# Patient Record
Sex: MALE | Race: WHITE | NOT HISPANIC OR LATINO | Employment: OTHER | URBAN - METROPOLITAN AREA
[De-identification: names, ages, dates, MRNs, and addresses within clinical notes are randomized per-mention and may not be internally consistent; named-entity substitution may affect disease eponyms.]

---

## 2023-08-28 ENCOUNTER — HOSPITAL ENCOUNTER (OUTPATIENT)
Dept: DATA CONVERSION | Facility: HOSPITAL | Age: 58
Discharge: HOME | End: 2023-08-28
Payer: COMMERCIAL

## 2023-08-28 DIAGNOSIS — Z00.00 ENCOUNTER FOR GENERAL ADULT MEDICAL EXAMINATION WITHOUT ABNORMAL FINDINGS: ICD-10-CM

## 2023-08-28 DIAGNOSIS — E78.5 HYPERLIPIDEMIA, UNSPECIFIED: ICD-10-CM

## 2023-08-28 DIAGNOSIS — Z12.11 ENCOUNTER FOR SCREENING FOR MALIGNANT NEOPLASM OF COLON: ICD-10-CM

## 2023-08-28 DIAGNOSIS — Z12.5 ENCOUNTER FOR SCREENING FOR MALIGNANT NEOPLASM OF PROSTATE: ICD-10-CM

## 2023-08-28 DIAGNOSIS — E55.9 VITAMIN D DEFICIENCY, UNSPECIFIED: ICD-10-CM

## 2023-08-28 LAB
25(OH)D3 SERPL-MCNC: 16 NG/ML (ref 31–100)
ALBUMIN SERPL-MCNC: 3.7 GM/DL (ref 3.5–5)
ALBUMIN/GLOB SERPL: 1 RATIO (ref 1.5–3)
ALP BLD-CCNC: 100 U/L (ref 35–125)
ALT SERPL-CCNC: 18 U/L (ref 5–40)
ANION GAP SERPL CALCULATED.3IONS-SCNC: 14 MMOL/L (ref 0–19)
APPEARANCE PLAS: CLEAR
AST SERPL-CCNC: 21 U/L (ref 5–40)
BASOPHILS # BLD AUTO: 0.09 K/UL (ref 0–0.22)
BASOPHILS NFR BLD AUTO: 1.3 % (ref 0–1)
BILIRUB SERPL-MCNC: 0.4 MG/DL (ref 0.1–1.2)
BUN SERPL-MCNC: 14 MG/DL (ref 8–25)
BUN/CREAT SERPL: 10 RATIO (ref 8–21)
CALCIUM SERPL-MCNC: 9.1 MG/DL (ref 8.5–10.4)
CHLORIDE SERPL-SCNC: 105 MMOL/L (ref 97–107)
CHOLEST SERPL-MCNC: 246 MG/DL (ref 133–200)
CHOLEST/HDLC SERPL: 5.5 RATIO
CO2 SERPL-SCNC: 22 MMOL/L (ref 24–31)
COLOR SPUN FLD: YELLOW
CREAT SERPL-MCNC: 1.4 MG/DL (ref 0.4–1.6)
DEPRECATED RDW RBC AUTO: 49.4 FL (ref 37–54)
DIFFERENTIAL METHOD BLD: ABNORMAL
EOSINOPHIL # BLD AUTO: 0.09 K/UL (ref 0–0.45)
EOSINOPHIL NFR BLD: 1.3 % (ref 0–3)
ERYTHROCYTE [DISTWIDTH] IN BLOOD BY AUTOMATED COUNT: 14.7 % (ref 11.7–15)
FASTING STATUS PATIENT QL REPORTED: ABNORMAL
GFR SERPL CREATININE-BSD FRML MDRD: 58 ML/MIN/1.73 M2
GLOBULIN SER-MCNC: 3.6 G/DL (ref 1.9–3.7)
GLUCOSE SERPL-MCNC: 90 MG/DL (ref 65–99)
HCT VFR BLD AUTO: 47.4 % (ref 41–50)
HDLC SERPL-MCNC: 45 MG/DL
HGB BLD-MCNC: 15.6 GM/DL (ref 13.5–16.5)
IMM GRANULOCYTES # BLD AUTO: 0.09 K/UL (ref 0–0.1)
LDLC SERPL CALC-MCNC: 185 MG/DL (ref 65–130)
LYMPHOCYTES # BLD AUTO: 1.25 K/UL (ref 1.2–3.2)
LYMPHOCYTES NFR BLD MANUAL: 17.4 % (ref 20–40)
MCH RBC QN AUTO: 30.1 PG (ref 26–34)
MCHC RBC AUTO-ENTMCNC: 32.9 % (ref 31–37)
MCV RBC AUTO: 91.5 FL (ref 80–100)
MONOCYTES # BLD AUTO: 0.91 K/UL (ref 0–0.8)
MONOCYTES NFR BLD MANUAL: 12.7 % (ref 0–8)
NEUTROPHILS # BLD AUTO: 4.75 K/UL
NEUTROPHILS # BLD AUTO: 4.75 K/UL (ref 1.8–7.7)
NEUTROPHILS.IMMATURE NFR BLD: 1.3 % (ref 0–1)
NEUTS SEG NFR BLD: 66 % (ref 50–70)
NRBC BLD-RTO: 0 /100 WBC
PLATELET # BLD AUTO: 261 K/UL (ref 150–450)
PMV BLD AUTO: 9.4 CU (ref 7–12.6)
POTASSIUM SERPL-SCNC: 4.8 MMOL/L (ref 3.4–5.1)
PROT SERPL-MCNC: 7.3 G/DL (ref 5.9–7.9)
PSA SERPL-MCNC: 0.1 NG/ML (ref 0–4.1)
RBC # BLD AUTO: 5.18 M/UL (ref 4.5–5.5)
SODIUM SERPL-SCNC: 141 MMOL/L (ref 133–145)
TRIGL SERPL-MCNC: 80 MG/DL (ref 40–150)
WBC # BLD AUTO: 7.2 K/UL (ref 4.5–11)

## 2023-09-01 PROBLEM — R40.1 CLOUDED CONSCIOUSNESS: Status: ACTIVE | Noted: 2023-09-01

## 2023-09-01 PROBLEM — I10 ESSENTIAL (PRIMARY) HYPERTENSION: Status: ACTIVE | Noted: 2023-09-01

## 2023-09-01 PROBLEM — F72 SEVERE INTELLECTUAL DISABILITY: Status: ACTIVE | Noted: 2018-09-12

## 2023-09-01 PROBLEM — R01.1 HEART MURMUR: Status: ACTIVE | Noted: 2023-09-01

## 2023-09-01 PROBLEM — D72.821 MONOCYTOSIS: Status: ACTIVE | Noted: 2021-02-19

## 2023-09-01 PROBLEM — L03.90 CELLULITIS: Status: ACTIVE | Noted: 2023-09-01

## 2023-09-01 PROBLEM — N18.30 STAGE 3 CHRONIC KIDNEY DISEASE (MULTI): Status: ACTIVE | Noted: 2023-09-01

## 2023-09-01 PROBLEM — T14.90XA BLUNT INJURY: Status: ACTIVE | Noted: 2023-09-01

## 2023-09-01 PROBLEM — R55 VASOVAGAL SYNCOPE: Status: ACTIVE | Noted: 2023-09-01

## 2023-09-01 PROBLEM — R09.02 HYPOXEMIA: Status: ACTIVE | Noted: 2023-09-01

## 2023-09-01 PROBLEM — I44.2 COMPLETE HEART BLOCK (MULTI): Status: ACTIVE | Noted: 2023-09-01

## 2023-09-01 PROBLEM — E78.5 HYPERLIPIDEMIA: Status: ACTIVE | Noted: 2023-09-01

## 2023-09-01 PROBLEM — L03.012 PARONYCHIA OF THUMB, LEFT: Status: ACTIVE | Noted: 2023-09-01

## 2023-09-01 PROBLEM — L40.9 PSORIASIS, UNSPECIFIED: Status: ACTIVE | Noted: 2018-09-12

## 2023-09-01 PROBLEM — M10.9 ACUTE GOUT: Status: ACTIVE | Noted: 2023-09-01

## 2023-09-01 PROBLEM — F73 PROFOUND INTELLECTUAL DISABILITY: Status: ACTIVE | Noted: 2023-09-01

## 2023-09-01 PROBLEM — R73.01 IMPAIRED FASTING GLUCOSE: Status: ACTIVE | Noted: 2023-09-01

## 2023-09-01 PROBLEM — R55 SYNCOPE AND COLLAPSE: Status: ACTIVE | Noted: 2023-09-01

## 2023-09-01 PROBLEM — E55.9 VITAMIN D DEFICIENCY: Status: ACTIVE | Noted: 2023-09-01

## 2023-09-01 PROBLEM — E66.9 OBESITY: Status: ACTIVE | Noted: 2023-09-01

## 2023-09-01 PROBLEM — Z95.0 CARDIAC PACEMAKER: Status: ACTIVE | Noted: 2023-09-01

## 2023-09-01 PROBLEM — L02.519 ABSCESS OF FINGER: Status: ACTIVE | Noted: 2023-09-01

## 2023-09-01 PROBLEM — S09.90XA INJURY OF HEAD: Status: ACTIVE | Noted: 2023-09-01

## 2023-09-01 PROBLEM — R77.9 ELEVATED SERUM PROTEIN LEVEL: Status: ACTIVE | Noted: 2020-10-05

## 2023-09-01 PROBLEM — R06.00 DYSPNEA: Status: ACTIVE | Noted: 2023-09-01

## 2023-09-01 PROBLEM — L03.019 PARONYCHIA, FINGER: Status: ACTIVE | Noted: 2020-01-07
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
## 2023-09-01 PROBLEM — Q90.9 DOWN SYNDROME, UNSPECIFIED (HHS-HCC): Status: ACTIVE | Noted: 2018-06-06

## 2023-09-01 PROBLEM — Z91.018 MULTIPLE FOOD ALLERGIES: Status: ACTIVE | Noted: 2023-09-01

## 2023-09-01 PROBLEM — D72.819 LEUKOPENIA: Status: ACTIVE | Noted: 2023-09-01

## 2023-09-01 PROBLEM — I51.7 ENLARGED HEART: Status: ACTIVE | Noted: 2023-09-01

## 2023-09-01 PROBLEM — Q90.9 COMPLETE TRISOMY 21 SYNDROME (HHS-HCC): Status: ACTIVE | Noted: 2023-09-01

## 2023-09-01 PROBLEM — R04.0 EPISTAXIS: Status: ACTIVE | Noted: 2023-09-01

## 2023-09-01 PROBLEM — R00.1 BRADYCARDIA: Status: ACTIVE | Noted: 2023-09-01

## 2023-09-01 PROBLEM — J18.9 ATYPICAL PNEUMONIA: Status: ACTIVE | Noted: 2023-09-01

## 2023-09-01 PROBLEM — R91.8 INFILTRATE OF LUNG PRESENT ON IMAGING STUDY: Status: ACTIVE | Noted: 2018-12-04

## 2023-09-01 PROBLEM — N19 RENAL FAILURE: Status: ACTIVE | Noted: 2023-09-01

## 2023-09-01 PROBLEM — F72 SEVERE INTELLECTUAL DISABILITY: Status: ACTIVE | Noted: 2020-01-07

## 2023-09-01 PROBLEM — L03.012: Status: ACTIVE | Noted: 2023-09-01

## 2023-09-01 PROBLEM — K21.9 CHRONIC GERD: Status: ACTIVE | Noted: 2023-09-01

## 2023-09-01 PROBLEM — R09.02 HYPOXIA: Status: ACTIVE | Noted: 2023-09-01

## 2023-09-01 PROBLEM — E03.9 ACQUIRED HYPOTHYROIDISM: Status: ACTIVE | Noted: 2019-06-11

## 2023-09-01 PROBLEM — F41.9 ANXIETY: Status: ACTIVE | Noted: 2023-09-01

## 2023-09-01 PROBLEM — K21.9 GASTROESOPHAGEAL REFLUX DISEASE: Status: ACTIVE | Noted: 2023-09-01

## 2023-09-01 RX ORDER — ZINC GLUCONATE 13.3 MG
200 LOZENGE ORAL 2 TIMES DAILY
COMMUNITY
Start: 2020-06-02

## 2023-09-01 RX ORDER — LEVOTHYROXINE SODIUM 125 UG/1
125 TABLET ORAL
COMMUNITY
Start: 2023-06-07

## 2023-09-01 RX ORDER — LEVOTHYROXINE SODIUM 175 UG/1
175 TABLET ORAL
COMMUNITY
Start: 2018-06-06

## 2023-09-01 RX ORDER — SODIUM CHLORIDE 5 %
OINTMENT (GRAM) OPHTHALMIC (EYE)
COMMUNITY

## 2023-09-01 RX ORDER — HYDROXYZINE HYDROCHLORIDE 50 MG/1
2 TABLET, FILM COATED ORAL
COMMUNITY
Start: 2023-03-14

## 2023-09-01 RX ORDER — LORATADINE 10 MG/1
10 TABLET ORAL
COMMUNITY
Start: 2023-06-07

## 2023-09-01 RX ORDER — DOCUSATE CALCIUM 240 MG
240 CAPSULE ORAL DAILY
COMMUNITY
Start: 2018-07-06

## 2023-09-01 RX ORDER — CHLORHEXIDINE GLUCONATE ORAL RINSE 1.2 MG/ML
0.02 SOLUTION DENTAL 2 TIMES DAILY
COMMUNITY
Start: 2018-06-07

## 2023-09-01 RX ORDER — ACETAMINOPHEN 500 MG/1
500 CAPSULE, LIQUID FILLED ORAL
COMMUNITY

## 2023-09-01 RX ORDER — LEVOTHYROXINE SODIUM 137 UG/1
137 TABLET ORAL DAILY
COMMUNITY
Start: 2020-01-08

## 2023-09-01 RX ORDER — IBUPROFEN 400 MG/1
400 TABLET ORAL
COMMUNITY
Start: 2016-08-12

## 2023-09-01 RX ORDER — DOCUSATE SODIUM 250 MG
250 CAPSULE ORAL EVERY 24 HOURS
COMMUNITY
Start: 2022-07-19

## 2023-09-01 RX ORDER — ALBUTEROL SULFATE 90 UG/1
2 AEROSOL, METERED RESPIRATORY (INHALATION) EVERY 4 HOURS PRN
COMMUNITY
Start: 2018-12-11

## 2023-09-01 RX ORDER — ONDANSETRON 4 MG/1
4 TABLET, ORALLY DISINTEGRATING ORAL EVERY 6 HOURS PRN
COMMUNITY
Start: 2016-11-13

## 2023-09-01 RX ORDER — LEVOTHYROXINE SODIUM 112 UG/1
112 TABLET ORAL
COMMUNITY
Start: 2023-07-07

## 2023-09-01 RX ORDER — HYDROXYZINE HYDROCHLORIDE 25 MG/1
25 TABLET, FILM COATED ORAL
COMMUNITY

## 2023-09-01 RX ORDER — CHLORHEXIDINE GLUCONATE 20 %
SOLUTION, NON-ORAL MISCELLANEOUS
COMMUNITY
Start: 2021-10-11

## 2023-09-01 RX ORDER — ONDANSETRON 4 MG/1
4 TABLET, FILM COATED ORAL EVERY 8 HOURS PRN
COMMUNITY
Start: 2018-06-07

## 2023-10-25 DIAGNOSIS — Z95.0 CARDIAC PACEMAKER: ICD-10-CM

## 2023-10-25 DIAGNOSIS — I44.2 COMPLETE HEART BLOCK (MULTI): Primary | ICD-10-CM

## 2023-12-18 ENCOUNTER — HOSPITAL ENCOUNTER (OUTPATIENT)
Dept: CARDIOLOGY | Facility: CLINIC | Age: 58
Discharge: HOME | End: 2023-12-18
Payer: MEDICARE

## 2023-12-18 DIAGNOSIS — I44.2 CHB (COMPLETE HEART BLOCK) (MULTI): ICD-10-CM

## 2024-01-16 ENCOUNTER — APPOINTMENT (OUTPATIENT)
Dept: CARDIOLOGY | Facility: CLINIC | Age: 59
End: 2024-01-16
Payer: MEDICARE

## 2024-01-25 ENCOUNTER — OFFICE VISIT (OUTPATIENT)
Dept: CARDIOLOGY | Facility: CLINIC | Age: 59
End: 2024-01-25
Payer: MEDICARE

## 2024-01-25 VITALS — BODY MASS INDEX: 25.1 KG/M2 | WEIGHT: 116 LBS

## 2024-01-25 DIAGNOSIS — R00.1 BRADYCARDIA: ICD-10-CM

## 2024-01-25 DIAGNOSIS — I44.2 COMPLETE HEART BLOCK (MULTI): ICD-10-CM

## 2024-01-25 DIAGNOSIS — Z95.0 CARDIAC PACEMAKER: ICD-10-CM

## 2024-01-25 DIAGNOSIS — Z95.0 CARDIAC PACEMAKER: Primary | ICD-10-CM

## 2024-01-25 PROCEDURE — 99213 OFFICE O/P EST LOW 20 MIN: CPT | Performed by: INTERNAL MEDICINE

## 2024-01-25 PROCEDURE — 1036F TOBACCO NON-USER: CPT | Performed by: INTERNAL MEDICINE

## 2024-01-25 ASSESSMENT — PAIN SCALES - GENERAL: PAINLEVEL: 0-NO PAIN

## 2024-01-25 NOTE — PROGRESS NOTES
No chief complaint on file.           The patient is a 58-year-old male with a history of severe Down syndrome who sees us because of sinus node dysfunction with syncope and intermittent heart block post dual-chamber pacemaker implant.  He is here for his usual 6-month visit.  He is doing quite well with no further episodes of syncope.  His device interrogation is as noted in the [employer] system         Active Ambulatory Problems     Diagnosis Date Noted    Vitamin D deficiency 09/01/2023    Vasovagal syncope 09/01/2023    Syncope and collapse 09/01/2023    Stage 3 chronic kidney disease (CMS/HCC) 09/01/2023    Renal failure 09/01/2023    Psoriasis, unspecified 09/12/2018    Severe intellectual disability 09/12/2018    Severe intellectual disability 01/07/2020    Atypical pneumonia 09/01/2023    Paronychia, finger 01/07/2020    Paronychia of thumb, left 09/01/2023    Obesity 09/01/2023    Monocytosis 02/19/2021    Leukopenia 09/01/2023    Injury of head 09/01/2023    Infiltrate of lung present on imaging study 12/04/2018    Impaired fasting glucose 09/01/2023    Hypoxia 09/01/2023    Hypoxemia 09/01/2023    Hyperlipidemia 09/01/2023    Heart murmur 09/01/2023    Fall 09/01/2023    Essential (primary) hypertension 09/01/2023    Enlarged heart 09/01/2023    Elevated serum protein level 10/05/2020    Dyspnea 09/01/2023    Down syndrome, unspecified 06/06/2018    Complete trisomy 21 syndrome 09/01/2023    Complete heart block (CMS/HCC) 09/01/2023    Clouded consciousness 09/01/2023    Gastroesophageal reflux disease 09/01/2023    Chronic GERD 09/01/2023    Cellulitis 09/01/2023    Cardiac pacemaker 09/01/2023    Bradycardia 09/01/2023    Blunt injury 09/01/2023    Anxiety 09/01/2023    Acute gout 09/01/2023    Acquired hypothyroidism 06/11/2019    Abscess of thumbnail of left hand 09/01/2023    Abscess of finger 09/01/2023    Epistaxis 09/01/2023    Multiple food allergies 09/01/2023    Profound intellectual disability  09/01/2023     Resolved Ambulatory Problems     Diagnosis Date Noted    No Resolved Ambulatory Problems     No Additional Past Medical History        Review of Systems   All other systems reviewed and are negative.       Objective     There were no vitals filed for this visit.     Constitutional:       Appearance: Not in distress.   Pulmonary:      Breath sounds: Normal breath sounds.   Cardiovascular:      PMI at left midclavicular line. Normal rate. Regular rhythm.      Murmurs: There is no murmur.      No gallop.  No click. No rub.            Lab Review:   No visits with results within 2 Month(s) from this visit.   Latest known visit with results is:   Hospital Outpatient Visit on 08/28/2023   Component Date Value    Calcium 08/28/2023 9.1     AST 08/28/2023 21     Alkaline Phosphatase 08/28/2023 100     Total Bilirubin 08/28/2023 0.4     Total Protein 08/28/2023 7.3     Albumin 08/28/2023 3.7     Globulin, Total 08/28/2023 3.6     A/G Ratio 08/28/2023 1.0 (L)     Sodium 08/28/2023 141     Potassium 08/28/2023 4.8     Chloride 08/28/2023 105     Bicarbonate 08/28/2023 22 (L)     Anion Gap 08/28/2023 14     Urea Nitrogen 08/28/2023 14     Creatinine 08/28/2023 1.4     Urea Nitrogen/Creatinine* 08/28/2023 10.0     Glucose 08/28/2023 90     ALT (SGPT) 08/28/2023 18     ESTIMATED GFR 08/28/2023 58     Differential Type 08/28/2023 AUTO DIFF     Immature Granulocyte %, * 08/28/2023 1.30 (H)     Neutrophil 08/28/2023 66.00     Lymphocytes % 08/28/2023 17.40 (L)     Monocytes % 08/28/2023 12.70 (H)     Eosinophil 08/28/2023 1.30     Basophils % 08/28/2023 1.30 (H)     Immature Granulocytes Ab* 08/28/2023 0.09     Neutrophils Absolute 08/28/2023 4.75     Lymphocytes Absolute 08/28/2023 1.25     Monocytes Absolute 08/28/2023 0.91 (H)     Eosinophils Absolute 08/28/2023 0.09     Basophils Absolute 08/28/2023 0.09     WBC 08/28/2023 7.2     RBC 08/28/2023 5.18     Hemoglobin 08/28/2023 15.6     Hematocrit 08/28/2023 47.4      MCV 08/28/2023 91.5     MCH 08/28/2023 30.1     MCHC 08/28/2023 32.9     RDW-SD 08/28/2023 49.4     RDW-CV 08/28/2023 14.7     Platelets 08/28/2023 261     MPV 08/28/2023 9.4     nRBC 08/28/2023 0     ABSOLUTE NEUTROPHIL CALC* 08/28/2023 4.75     SERUM COLOR 08/28/2023 YELLOW     SERUM CLARITY 08/28/2023 CLEAR     Is the patient fasting? 08/28/2023 FASTING     Cholesterol 08/28/2023 246 (H)     Triglycerides 08/28/2023 80     HDL 08/28/2023 45     LDL Calculated 08/28/2023 185 (H)     Cholesterol/HDL Ratio 08/28/2023 5.5     PSA 08/28/2023 0.1     Vitamin D, 25-Hydroxy 08/28/2023 16 (L)        Assessment/plan:  Continue follow-up in device clinic      Problem List Items Addressed This Visit    None

## 2024-02-20 ENCOUNTER — TELEPHONE (OUTPATIENT)
Dept: PRIMARY CARE | Facility: CLINIC | Age: 59
End: 2024-02-20
Payer: COMMERCIAL

## 2024-02-20 DIAGNOSIS — J01.00 ACUTE NON-RECURRENT MAXILLARY SINUSITIS: Primary | ICD-10-CM

## 2024-02-20 RX ORDER — AMOXICILLIN 500 MG/1
1000 CAPSULE ORAL 2 TIMES DAILY
Qty: 40 CAPSULE | Refills: 0 | Status: SHIPPED | OUTPATIENT
Start: 2024-02-20 | End: 2024-03-01

## 2024-02-21 ENCOUNTER — TELEPHONE (OUTPATIENT)
Dept: PRIMARY CARE | Facility: CLINIC | Age: 59
End: 2024-02-21
Payer: COMMERCIAL

## 2024-02-26 ENCOUNTER — HOSPITAL ENCOUNTER (OUTPATIENT)
Dept: CARDIOLOGY | Facility: CLINIC | Age: 59
Discharge: HOME | End: 2024-02-26
Payer: COMMERCIAL

## 2024-02-26 DIAGNOSIS — I44.2 CHB (COMPLETE HEART BLOCK): ICD-10-CM

## 2024-02-26 DIAGNOSIS — Z95.0 CARDIAC PACEMAKER: ICD-10-CM

## 2024-02-28 ENCOUNTER — TELEPHONE (OUTPATIENT)
Dept: CARDIOLOGY | Facility: CLINIC | Age: 59
End: 2024-02-28

## 2024-02-28 NOTE — TELEPHONE ENCOUNTER
Patients sister calling in to discuss events that have occurred in the past couple days. Requesting to speak with nurse.

## 2024-03-25 ENCOUNTER — HOSPITAL ENCOUNTER (EMERGENCY)
Facility: HOSPITAL | Age: 59
Discharge: HOME | End: 2024-03-25
Attending: EMERGENCY MEDICINE
Payer: MEDICARE

## 2024-03-25 ENCOUNTER — APPOINTMENT (OUTPATIENT)
Dept: RADIOLOGY | Facility: HOSPITAL | Age: 59
End: 2024-03-25
Payer: MEDICARE

## 2024-03-25 VITALS
HEIGHT: 62 IN | BODY MASS INDEX: 21.35 KG/M2 | RESPIRATION RATE: 18 BRPM | DIASTOLIC BLOOD PRESSURE: 47 MMHG | HEART RATE: 88 BPM | WEIGHT: 116 LBS | SYSTOLIC BLOOD PRESSURE: 105 MMHG | OXYGEN SATURATION: 100 % | TEMPERATURE: 97.5 F

## 2024-03-25 DIAGNOSIS — J18.9 PNEUMONIA OF RIGHT LOWER LOBE DUE TO INFECTIOUS ORGANISM: Primary | ICD-10-CM

## 2024-03-25 PROCEDURE — 99283 EMERGENCY DEPT VISIT LOW MDM: CPT

## 2024-03-25 PROCEDURE — 71046 X-RAY EXAM CHEST 2 VIEWS: CPT | Performed by: RADIOLOGY

## 2024-03-25 PROCEDURE — 71046 X-RAY EXAM CHEST 2 VIEWS: CPT

## 2024-03-25 PROCEDURE — 2500000001 HC RX 250 WO HCPCS SELF ADMINISTERED DRUGS (ALT 637 FOR MEDICARE OP): Performed by: EMERGENCY MEDICINE

## 2024-03-25 RX ORDER — ONDANSETRON HYDROCHLORIDE 2 MG/ML
4 INJECTION, SOLUTION INTRAVENOUS ONCE
Status: DISCONTINUED | OUTPATIENT
Start: 2024-03-25 | End: 2024-03-25

## 2024-03-25 RX ORDER — LEVOFLOXACIN 750 MG/1
750 TABLET ORAL DAILY
Qty: 7 TABLET | Refills: 0 | Status: SHIPPED | OUTPATIENT
Start: 2024-03-25 | End: 2024-04-01

## 2024-03-25 RX ORDER — LEVOFLOXACIN 500 MG/1
500 TABLET, FILM COATED ORAL ONCE
Status: COMPLETED | OUTPATIENT
Start: 2024-03-25 | End: 2024-03-25

## 2024-03-25 RX ADMIN — LEVOFLOXACIN 500 MG: 500 TABLET, FILM COATED ORAL at 16:07

## 2024-03-25 ASSESSMENT — PAIN SCALES - WONG BAKER: WONGBAKER_NUMERICALRESPONSE: NO HURT

## 2024-03-25 ASSESSMENT — LIFESTYLE VARIABLES
HAVE YOU EVER FELT YOU SHOULD CUT DOWN ON YOUR DRINKING: NO
HAVE PEOPLE ANNOYED YOU BY CRITICIZING YOUR DRINKING: NO
EVER HAD A DRINK FIRST THING IN THE MORNING TO STEADY YOUR NERVES TO GET RID OF A HANGOVER: NO
TOTAL SCORE: 0
EVER FELT BAD OR GUILTY ABOUT YOUR DRINKING: NO

## 2024-03-25 ASSESSMENT — COLUMBIA-SUICIDE SEVERITY RATING SCALE - C-SSRS
2. HAVE YOU ACTUALLY HAD ANY THOUGHTS OF KILLING YOURSELF?: NO
1. IN THE PAST MONTH, HAVE YOU WISHED YOU WERE DEAD OR WISHED YOU COULD GO TO SLEEP AND NOT WAKE UP?: NO
6. HAVE YOU EVER DONE ANYTHING, STARTED TO DO ANYTHING, OR PREPARED TO DO ANYTHING TO END YOUR LIFE?: NO

## 2024-03-25 ASSESSMENT — PAIN - FUNCTIONAL ASSESSMENT: PAIN_FUNCTIONAL_ASSESSMENT: WONG-BAKER FACES

## 2024-03-25 NOTE — Clinical Note
Cyril Nashreece was seen and treated in our emergency department on 3/25/2024.  He may return to school on 03/27/2024.  May return to adult  after being on antibiotics for 24 hours    If you have any questions or concerns, please don't hesitate to call.      Penelope Donnelly MD

## 2024-03-25 NOTE — DISCHARGE INSTRUCTIONS
You have been diagnosed with community-acquired pneumonia.  Take antibiotics as prescribed levofloxacin 750 mg tablet once daily until gone (to be administered by [facility] facility staff).  You may additionally take over-the-counter medications as needed for pain, over-the-counter cough medication and other symptomatic control medications as needed.  Return immediately to the emergency department if you develop increased shortness of breath, chest pain, fever lasting more than 5 days or other symptoms that concern you.  Follow-up with your primary care physician to ensure resolution of your pneumonia.

## 2024-03-25 NOTE — ED PROVIDER NOTES
HPI   Chief Complaint   Patient presents with    Vomiting       58-year-old male with pervasive developmental delay lives in a group home was at his adult  during the day today where the facility reported that he had vomited several times.  Patient is nonverbal so the history obtained from his power of  who is his sister at bedside.  She states that the  facility described this more as coughing up green phlegm than actual vomiting.  She states he has had some congestion intermittent over the past couple of weeks.  No fever.  She states that he was fine this past weekend when she was with him.  He has not complained of any pain, eating and drinking well without any changes in bowel movements.  Sister is concerned that he could have pneumonia giving the coughing up of phlegm.      History provided by:  Relative  History limited by:  Patient nonverbal                      [place] Coma Scale Score: 15                     Patient History   Past Medical History:   Diagnosis Date    Hypoxemia 04/16/2015    Hypoxia     No past surgical history on file.  Family History   Problem Relation Name Age of Onset    No Known Problems Mother      No Known Problems Father       Social History     Tobacco Use    Smoking status: Never    Smokeless tobacco: Never   Substance Use Topics    Alcohol use: Never    Drug use: Never       Physical Exam   ED Triage Vitals [03/25/24 1330]   Temperature Heart Rate Respirations BP   36.4 °C (97.5 °F) 88 18 (!) 105/47      Pulse Ox Temp Source Heart Rate Source Patient Position   100 % Temporal Monitor Sitting      BP Location FiO2 (%)     Left arm --       Physical Exam  Vitals and nursing note reviewed.     General: Vitals reviewed. Awake, alert, well-developed, well-nourished, NAD  HEENT: NC/AT, PERRL, MMM, enlarged tongue, scant clear rhinorrhea present  Neck: Supple, trachea midline  Respiratory: No respiratory distress, lungs clear to auscultation bilaterally, no wheezes,  rhonchi, or rales  CV: Regular rate and regular rhythm, no murmur/gallop/rubs  Abdomen/GI: Soft, non-tender, non-distended, no rebound, guarding, or rigidity, normal bowel sounds  Extremities: Moving all extremities, no deformities  Neuro: A/O,at baseline per sister  Skin: Warm, dry. No rashes identified    ED Course & MDM   Diagnoses as of 03/25/24 1601   Pneumonia of right lower lobe due to infectious organism       Medical Decision Making  58-year-old male presents for what was initially described as vomiting but ultimately appears to have been coughing up of greenish sputum.  Sister reports congestion for the past 2 weeks intermittently but no fever or other concerning symptoms.  He is hemodynamically stable nontoxic-appearing saturating 100% on room air.  Lungs are clear and physical exam is essentially unremarkable.  Did have detailed shared decision making discussion with sister who is power of  who does not want to pursue any labs at this time which I feel is reasonable given physical exam is unremarkable and in fact what was described as vomiting was actually more productive cough/congestion.  His abdominal exam is benign he is not having any other GI symptoms therefore low suspicion for bowel obstruction, appendicitis, cholecystitis among other intra-abdominal emergencies I do not feel further workup is indicated at this time although again labs were considered.  Will obtain chest x-ray to rule out pneumonia.    Chest x-ray on my independent interpretation and confirmed by radiologist shows right basilar infiltrate with minimal right pleural effusion.  Will treat with levofloxacin for community-acquired pneumonia given allergy to amoxicillin first dose given in the emergency department.  Given he is hemodynamically stable without evidence of sepsis do feel appropriate for outpatient management and his sister, power of  is agreeable with this plan of care.  Discharged home in stable  condition.  Return and follow-up instructions discussed.    Amount and/or Complexity of Data Reviewed  Independent Historian: guardian  Radiology: ordered and independent interpretation performed. Decision-making details documented in ED Course.        Procedure  Procedures     Penelope Donnelly MD  03/25/24 [id number]

## 2024-03-29 ENCOUNTER — OFFICE VISIT (OUTPATIENT)
Dept: PRIMARY CARE | Facility: CLINIC | Age: 59
End: 2024-03-29
Payer: MEDICARE

## 2024-03-29 VITALS
BODY MASS INDEX: 21.95 KG/M2 | WEIGHT: 120 LBS | HEART RATE: 78 BPM | DIASTOLIC BLOOD PRESSURE: 68 MMHG | RESPIRATION RATE: 20 BRPM | SYSTOLIC BLOOD PRESSURE: 118 MMHG

## 2024-03-29 DIAGNOSIS — S09.93XA FACIAL INJURY, INITIAL ENCOUNTER: Primary | ICD-10-CM

## 2024-03-29 DIAGNOSIS — J18.9 ATYPICAL PNEUMONIA: ICD-10-CM

## 2024-03-29 PROCEDURE — 99214 OFFICE O/P EST MOD 30 MIN: CPT | Performed by: NURSE PRACTITIONER

## 2024-03-29 PROCEDURE — 3074F SYST BP LT 130 MM HG: CPT | Performed by: NURSE PRACTITIONER

## 2024-03-29 PROCEDURE — 3078F DIAST BP <80 MM HG: CPT | Performed by: NURSE PRACTITIONER

## 2024-03-29 ASSESSMENT — PAIN SCALES - GENERAL: PAINLEVEL: 0-NO PAIN

## 2024-03-29 ASSESSMENT — PATIENT HEALTH QUESTIONNAIRE - PHQ9
2. FEELING DOWN, DEPRESSED OR HOPELESS: NOT AT ALL
1. LITTLE INTEREST OR PLEASURE IN DOING THINGS: NOT AT ALL
SUM OF ALL RESPONSES TO PHQ9 QUESTIONS 1 AND 2: 0

## 2024-03-29 ASSESSMENT — ENCOUNTER SYMPTOMS
LOSS OF SENSATION IN FEET: 0
OCCASIONAL FEELINGS OF UNSTEADINESS: 1
DEPRESSION: 0

## 2024-04-01 NOTE — PROGRESS NOTES
Subjective   Patient ID: Cyril Hernandez is a 58 y.o. male who presents for Bleeding/Bruising (Caretaker states that patient had a bruise show up on his left temporal area on Tuesday. He also went to ER last week because he was having issues with vomiting. There is an open case to investigate.).    HPI Here today with sister and  Tiffanie Dinero. Pt is in wheelchair, non verbual down symdrome. Reported by sister that left side of face was red and scratches noted on chest after she dropped him off after being at the hospital and diagnosised with pneumonia and at 7 am was found in his room with 3-4 cm erthymatic lesion , staff in the home called the  department and night staff report they check on the residents at night and leave the bedroom doors cracked. No reports of falling out of bed, and manager feels that staff has been safety and no previous issues with assults. Sister was concerned about another resident that is blind and wanders but manager reports that his bed has an alarm. No change in LOC or behaviors but just wanted to document situation   Additionally scratches could be done by self due to agitation or staff from shaving but are on is neck    Review of Systems    Objective   /68   Pulse 78   Resp 20   Wt 54.4 kg (120 lb) Comment: per caretaker  BMI 21.95 kg/m²     Physical Exam  Constitutional:       General: He is not in acute distress.     Appearance: Normal appearance.   HENT:      Head: Normocephalic.      Comments: Slight purple discoloration with yellow bruising noted on left temple to check approximately 3 cm , non tender,      Nose: Nose normal.      Mouth/Throat:      Mouth: Mucous membranes are moist.   Eyes:      Pupils: Pupils are equal, round, and reactive to light.   Neck:      Comments: Scratches noted from left side of neck and under mid neck   Cardiovascular:      Rate and Rhythm: Normal rate and regular rhythm.      Heart sounds: No murmur heard.  Pulmonary:       Breath sounds: Normal breath sounds. No wheezing.   Musculoskeletal:      Cervical back: Normal range of motion.   Neurological:      Mental Status: He is alert.         Assessment/Plan   Problem List Items Addressed This Visit             ICD-10-CM    Atypical pneumonia J18.9    Relevant Orders    XR chest 2 views     Other Visit Diagnoses         Codes    Facial injury, initial encounter    -  Primary S09.93XA          Ongoing investigation per APS and resident to be ongoing.   Due to patient inability to use inhaler effectively for breathing from mental and physical disability would benefit by using nebulizer treatment for pneumonia.

## 2024-04-05 ENCOUNTER — HOSPITAL ENCOUNTER (OUTPATIENT)
Dept: RADIOLOGY | Facility: HOSPITAL | Age: 59
Discharge: HOME | End: 2024-04-05
Payer: MEDICARE

## 2024-04-05 DIAGNOSIS — J18.9 ATYPICAL PNEUMONIA: ICD-10-CM

## 2024-04-05 PROCEDURE — 71046 X-RAY EXAM CHEST 2 VIEWS: CPT | Performed by: RADIOLOGY

## 2024-04-05 PROCEDURE — 71046 X-RAY EXAM CHEST 2 VIEWS: CPT

## 2024-04-06 DIAGNOSIS — J18.9 ATYPICAL PNEUMONIA: Primary | ICD-10-CM

## 2024-04-06 RX ORDER — ALBUTEROL SULFATE 0.83 MG/ML
2.5 SOLUTION RESPIRATORY (INHALATION) 3 TIMES DAILY
Qty: 90 ML | Refills: 3 | Status: SHIPPED | OUTPATIENT
Start: 2024-04-06 | End: 2024-05-31

## 2024-04-06 RX ORDER — LEVOFLOXACIN 500 MG/1
500 TABLET, FILM COATED ORAL DAILY
Qty: 10 TABLET | Refills: 0 | Status: SHIPPED | OUTPATIENT
Start: 2024-04-06 | End: 2024-04-16

## 2024-04-08 ENCOUNTER — TELEPHONE (OUTPATIENT)
Dept: PRIMARY CARE | Facility: CLINIC | Age: 59
End: 2024-04-08
Payer: COMMERCIAL

## 2024-04-08 DIAGNOSIS — J18.9 ATYPICAL PNEUMONIA: ICD-10-CM

## 2024-04-19 ENCOUNTER — TELEPHONE (OUTPATIENT)
Dept: PRIMARY CARE | Facility: CLINIC | Age: 59
End: 2024-04-19
Payer: COMMERCIAL

## 2024-04-19 NOTE — TELEPHONE ENCOUNTER
María from [facility] called requesting 3/29/24 note to be changed.     Please addend 3/29/24 OV note to include medical condition that requires nebulizer along with   Need/benefit for nebulizer AND the medication to be ordered with it for insurance purposes.     Thank you     Fax to [facility] [phone]

## 2024-04-22 ENCOUNTER — HOSPITAL ENCOUNTER (OUTPATIENT)
Dept: CARDIOLOGY | Facility: CLINIC | Age: 59
Discharge: HOME | End: 2024-04-22
Payer: MEDICARE

## 2024-04-22 DIAGNOSIS — Z95.0 CARDIAC PACEMAKER: ICD-10-CM

## 2024-04-22 DIAGNOSIS — I44.2 COMPLETE HEART BLOCK (MULTI): ICD-10-CM

## 2024-04-22 PROCEDURE — 93294 REM INTERROG EVL PM/LDLS PM: CPT | Performed by: INTERNAL MEDICINE

## 2024-04-22 PROCEDURE — 93296 REM INTERROG EVL PM/IDS: CPT

## 2024-04-23 DIAGNOSIS — J18.9 ATYPICAL PNEUMONIA: ICD-10-CM

## 2024-04-23 DIAGNOSIS — R06.00 DYSPNEA, UNSPECIFIED TYPE: Primary | ICD-10-CM

## 2024-04-23 DIAGNOSIS — R09.02 HYPOXIA: ICD-10-CM

## 2024-05-30 DIAGNOSIS — J18.9 ATYPICAL PNEUMONIA: Primary | ICD-10-CM

## 2024-05-31 RX ORDER — ALBUTEROL SULFATE 0.83 MG/ML
SOLUTION RESPIRATORY (INHALATION)
Qty: 150 ML | Status: SHIPPED | OUTPATIENT
Start: 2024-05-31

## 2024-06-14 ENCOUNTER — OFFICE VISIT (OUTPATIENT)
Dept: PRIMARY CARE | Facility: CLINIC | Age: 59
End: 2024-06-14
Payer: MEDICARE

## 2024-06-14 VITALS
HEIGHT: 62 IN | DIASTOLIC BLOOD PRESSURE: 68 MMHG | BODY MASS INDEX: 23.92 KG/M2 | WEIGHT: 130 LBS | HEART RATE: 65 BPM | SYSTOLIC BLOOD PRESSURE: 128 MMHG | OXYGEN SATURATION: 98 % | RESPIRATION RATE: 20 BRPM

## 2024-06-14 DIAGNOSIS — Z79.899 MEDICATION MANAGEMENT: Primary | ICD-10-CM

## 2024-06-14 PROCEDURE — 99212 OFFICE O/P EST SF 10 MIN: CPT | Performed by: NURSE PRACTITIONER

## 2024-06-14 PROCEDURE — 3074F SYST BP LT 130 MM HG: CPT | Performed by: NURSE PRACTITIONER

## 2024-06-14 PROCEDURE — 3078F DIAST BP <80 MM HG: CPT | Performed by: NURSE PRACTITIONER

## 2024-06-14 RX ORDER — ALUMINUM HYDROXIDE, MAGNESIUM HYDROXIDE, AND SIMETHICONE 1200; 120; 1200 MG/30ML; MG/30ML; MG/30ML
SUSPENSION ORAL EVERY 6 HOURS PRN
COMMUNITY

## 2024-06-14 RX ORDER — ATORVASTATIN CALCIUM 20 MG/1
20 TABLET, FILM COATED ORAL DAILY
COMMUNITY

## 2024-06-14 RX ORDER — HYDROCORTISONE 1 %
CREAM (GRAM) TOPICAL 2 TIMES DAILY
COMMUNITY

## 2024-06-14 RX ORDER — ERGOCALCIFEROL 1.25 MG/1
1.25 CAPSULE ORAL
COMMUNITY

## 2024-06-14 ASSESSMENT — ENCOUNTER SYMPTOMS
OCCASIONAL FEELINGS OF UNSTEADINESS: 1
DEPRESSION: 0
LOSS OF SENSATION IN FEET: 0

## 2024-06-14 ASSESSMENT — PAIN SCALES - GENERAL: PAINLEVEL: 0-NO PAIN

## 2024-06-14 ASSESSMENT — PATIENT HEALTH QUESTIONNAIRE - PHQ9
1. LITTLE INTEREST OR PLEASURE IN DOING THINGS: NOT AT ALL
2. FEELING DOWN, DEPRESSED OR HOPELESS: NOT AT ALL
SUM OF ALL RESPONSES TO PHQ9 QUESTIONS 1 AND 2: 0

## 2024-06-18 NOTE — PROGRESS NOTES
"Subjective   Patient ID: Elliott Hernandez is a 58 y.o. male who presents for Med Refill (Caretaker states he needs a standing order triple antibiotic, tylenol and hydrocortisone cream. ).  Has physical scheduled for labs in few weeks   Med Refill     needs med list signed for workshop staff    Review of Systems    Objective   /68   Pulse 65   Resp 20   Ht 1.575 m (5' 2\")   Wt 59 kg (130 lb)   SpO2 98%   BMI 23.78 kg/m²     Physical Exam  Constitutional:       General: He is not in acute distress.     Appearance: Normal appearance.      Comments: Non verbual, smiling    Cardiovascular:      Rate and Rhythm: Normal rate and regular rhythm.      Heart sounds: No murmur heard.  Pulmonary:      Breath sounds: Normal breath sounds. No wheezing.   Musculoskeletal:      Comments: Ambulatory    Neurological:      Mental Status: He is alert.         Assessment/Plan   Problem List Items Addressed This Visit    None  Visit Diagnoses         Codes    Medication management    -  Primary Z79.899               "

## 2024-06-29 LAB — BODY SURFACE AREA: 1.61 M2

## 2024-07-05 DIAGNOSIS — F72 SEVERE INTELLECTUAL DISABILITY: Primary | ICD-10-CM

## 2024-07-05 DIAGNOSIS — K21.9 GASTROESOPHAGEAL REFLUX DISEASE, UNSPECIFIED WHETHER ESOPHAGITIS PRESENT: ICD-10-CM

## 2024-07-05 DIAGNOSIS — E03.9 HYPOTHYROIDISM, UNSPECIFIED TYPE: ICD-10-CM

## 2024-07-05 DIAGNOSIS — E78.5 HYPERLIPIDEMIA, UNSPECIFIED HYPERLIPIDEMIA TYPE: ICD-10-CM

## 2024-07-05 RX ORDER — POLYETHYLENE GLYCOL 3350 17 G/17G
POWDER, FOR SOLUTION ORAL
Qty: 30 PACKET | Refills: 2 | Status: SHIPPED | OUTPATIENT
Start: 2024-07-05

## 2024-07-05 RX ORDER — ATORVASTATIN CALCIUM 20 MG/1
TABLET, FILM COATED ORAL
Qty: 30 TABLET | Refills: 0 | Status: SHIPPED | OUTPATIENT
Start: 2024-07-05

## 2024-07-05 RX ORDER — DOCUSATE SODIUM 250 MG
CAPSULE ORAL
Qty: 30 CAPSULE | Refills: 0 | Status: SHIPPED | OUTPATIENT
Start: 2024-07-05

## 2024-07-05 RX ORDER — ZINC GLUCONATE 13.3 MG
LOZENGE ORAL
Qty: 60 TABLET | Refills: 0 | Status: SHIPPED | OUTPATIENT
Start: 2024-07-05

## 2024-07-05 RX ORDER — ERGOCALCIFEROL 1.25 MG/1
CAPSULE ORAL
Qty: 4 CAPSULE | Refills: 0 | Status: SHIPPED | OUTPATIENT
Start: 2024-07-05

## 2024-07-05 RX ORDER — LORATADINE 10 MG/1
TABLET ORAL
Qty: 30 TABLET | Refills: 0 | Status: SHIPPED | OUTPATIENT
Start: 2024-07-05

## 2024-07-05 RX ORDER — LEVOTHYROXINE SODIUM 112 UG/1
TABLET ORAL
Qty: 30 TABLET | Refills: 0 | Status: SHIPPED | OUTPATIENT
Start: 2024-07-05

## 2024-07-05 RX ORDER — CHLORHEXIDINE GLUCONATE ORAL RINSE 1.2 MG/ML
SOLUTION DENTAL
Qty: 12 ML | Refills: 3 | Status: SHIPPED | OUTPATIENT
Start: 2024-07-05

## 2024-07-06 DIAGNOSIS — J18.9 ATYPICAL PNEUMONIA: ICD-10-CM

## 2024-07-08 RX ORDER — ALBUTEROL SULFATE 0.83 MG/ML
3 SOLUTION RESPIRATORY (INHALATION) 3 TIMES DAILY
Qty: 90 ML | Refills: 11 | Status: SHIPPED | OUTPATIENT
Start: 2024-07-08 | End: 2024-07-12 | Stop reason: SDUPTHER

## 2024-07-11 DIAGNOSIS — K21.9 GASTROESOPHAGEAL REFLUX DISEASE, UNSPECIFIED WHETHER ESOPHAGITIS PRESENT: ICD-10-CM

## 2024-07-11 DIAGNOSIS — F72 SEVERE INTELLECTUAL DISABILITY: ICD-10-CM

## 2024-07-11 DIAGNOSIS — E03.9 HYPOTHYROIDISM, UNSPECIFIED TYPE: ICD-10-CM

## 2024-07-11 DIAGNOSIS — E78.5 HYPERLIPIDEMIA, UNSPECIFIED HYPERLIPIDEMIA TYPE: ICD-10-CM

## 2024-07-12 ENCOUNTER — HOSPITAL ENCOUNTER (EMERGENCY)
Facility: HOSPITAL | Age: 59
Discharge: HOME | End: 2024-07-12
Attending: EMERGENCY MEDICINE
Payer: MEDICARE

## 2024-07-12 ENCOUNTER — APPOINTMENT (OUTPATIENT)
Dept: RADIOLOGY | Facility: HOSPITAL | Age: 59
End: 2024-07-12
Payer: MEDICARE

## 2024-07-12 VITALS
HEART RATE: 66 BPM | RESPIRATION RATE: 22 BRPM | SYSTOLIC BLOOD PRESSURE: 131 MMHG | WEIGHT: 132.72 LBS | DIASTOLIC BLOOD PRESSURE: 72 MMHG | OXYGEN SATURATION: 95 % | HEIGHT: 62 IN | BODY MASS INDEX: 24.42 KG/M2

## 2024-07-12 DIAGNOSIS — J18.9 ATYPICAL PNEUMONIA: Primary | ICD-10-CM

## 2024-07-12 DIAGNOSIS — J18.9 ATYPICAL PNEUMONIA: ICD-10-CM

## 2024-07-12 DIAGNOSIS — J42 CHRONIC BRONCHITIS, UNSPECIFIED CHRONIC BRONCHITIS TYPE (MULTI): ICD-10-CM

## 2024-07-12 LAB
ANION GAP SERPL CALC-SCNC: 9 MMOL/L
BASOPHILS # BLD AUTO: 0.07 X10*3/UL (ref 0–0.1)
BASOPHILS NFR BLD AUTO: 1.3 %
BUN SERPL-MCNC: 21 MG/DL (ref 8–25)
CALCIUM SERPL-MCNC: 9.3 MG/DL (ref 8.5–10.4)
CHLORIDE SERPL-SCNC: 100 MMOL/L (ref 97–107)
CO2 SERPL-SCNC: 28 MMOL/L (ref 24–31)
CREAT SERPL-MCNC: 1.4 MG/DL (ref 0.4–1.6)
EGFRCR SERPLBLD CKD-EPI 2021: 58 ML/MIN/1.73M*2
EOSINOPHIL # BLD AUTO: 0.09 X10*3/UL (ref 0–0.7)
EOSINOPHIL NFR BLD AUTO: 1.7 %
ERYTHROCYTE [DISTWIDTH] IN BLOOD BY AUTOMATED COUNT: 14.8 % (ref 11.5–14.5)
GLUCOSE SERPL-MCNC: 98 MG/DL (ref 65–99)
HCT VFR BLD AUTO: 46.5 % (ref 41–52)
HGB BLD-MCNC: 15.3 G/DL (ref 13.5–17.5)
IMM GRANULOCYTES # BLD AUTO: 0.04 X10*3/UL (ref 0–0.7)
IMM GRANULOCYTES NFR BLD AUTO: 0.8 % (ref 0–0.9)
LACTATE BLDV-SCNC: 1 MMOL/L (ref 0.4–2)
LYMPHOCYTES # BLD AUTO: 0.99 X10*3/UL (ref 1.2–4.8)
LYMPHOCYTES NFR BLD AUTO: 19 %
MCH RBC QN AUTO: 30.4 PG (ref 26–34)
MCHC RBC AUTO-ENTMCNC: 32.9 G/DL (ref 32–36)
MCV RBC AUTO: 92 FL (ref 80–100)
MONOCYTES # BLD AUTO: 0.67 X10*3/UL (ref 0.1–1)
MONOCYTES NFR BLD AUTO: 12.8 %
NEUTROPHILS # BLD AUTO: 3.36 X10*3/UL (ref 1.2–7.7)
NEUTROPHILS NFR BLD AUTO: 64.4 %
NRBC BLD-RTO: 0 /100 WBCS (ref 0–0)
PLATELET # BLD AUTO: 234 X10*3/UL (ref 150–450)
POTASSIUM SERPL-SCNC: 4.4 MMOL/L (ref 3.4–5.1)
RBC # BLD AUTO: 5.04 X10*6/UL (ref 4.5–5.9)
SODIUM SERPL-SCNC: 137 MMOL/L (ref 133–145)
WBC # BLD AUTO: 5.2 X10*3/UL (ref 4.4–11.3)

## 2024-07-12 PROCEDURE — 71045 X-RAY EXAM CHEST 1 VIEW: CPT | Performed by: STUDENT IN AN ORGANIZED HEALTH CARE EDUCATION/TRAINING PROGRAM

## 2024-07-12 PROCEDURE — 2500000002 HC RX 250 W HCPCS SELF ADMINISTERED DRUGS (ALT 637 FOR MEDICARE OP, ALT 636 FOR OP/ED): Performed by: EMERGENCY MEDICINE

## 2024-07-12 PROCEDURE — 96365 THER/PROPH/DIAG IV INF INIT: CPT

## 2024-07-12 PROCEDURE — 2500000004 HC RX 250 GENERAL PHARMACY W/ HCPCS (ALT 636 FOR OP/ED): Performed by: EMERGENCY MEDICINE

## 2024-07-12 PROCEDURE — 99284 EMERGENCY DEPT VISIT MOD MDM: CPT | Mod: 25

## 2024-07-12 PROCEDURE — 83605 ASSAY OF LACTIC ACID: CPT | Performed by: EMERGENCY MEDICINE

## 2024-07-12 PROCEDURE — 85025 COMPLETE CBC W/AUTO DIFF WBC: CPT | Performed by: EMERGENCY MEDICINE

## 2024-07-12 PROCEDURE — 94640 AIRWAY INHALATION TREATMENT: CPT

## 2024-07-12 PROCEDURE — 87040 BLOOD CULTURE FOR BACTERIA: CPT | Mod: TRILAB | Performed by: EMERGENCY MEDICINE

## 2024-07-12 PROCEDURE — 82374 ASSAY BLOOD CARBON DIOXIDE: CPT | Performed by: EMERGENCY MEDICINE

## 2024-07-12 PROCEDURE — 71045 X-RAY EXAM CHEST 1 VIEW: CPT

## 2024-07-12 PROCEDURE — 36415 COLL VENOUS BLD VENIPUNCTURE: CPT | Performed by: EMERGENCY MEDICINE

## 2024-07-12 RX ORDER — ATORVASTATIN CALCIUM 20 MG/1
TABLET, FILM COATED ORAL
Qty: 30 TABLET | Refills: 0 | OUTPATIENT
Start: 2024-07-12

## 2024-07-12 RX ORDER — DOCUSATE SODIUM 250 MG
CAPSULE ORAL
Qty: 30 CAPSULE | Refills: 0 | OUTPATIENT
Start: 2024-07-12

## 2024-07-12 RX ORDER — LORATADINE 10 MG/1
TABLET ORAL
Qty: 30 TABLET | Refills: 0 | OUTPATIENT
Start: 2024-07-12

## 2024-07-12 RX ORDER — LEVOTHYROXINE SODIUM 112 UG/1
TABLET ORAL
Qty: 30 TABLET | Refills: 0 | OUTPATIENT
Start: 2024-07-12

## 2024-07-12 RX ORDER — ERGOCALCIFEROL 1.25 MG/1
CAPSULE ORAL
Qty: 4 CAPSULE | Refills: 0 | OUTPATIENT
Start: 2024-07-12

## 2024-07-12 RX ORDER — LEVOFLOXACIN 5 MG/ML
750 INJECTION, SOLUTION INTRAVENOUS ONCE
Status: COMPLETED | OUTPATIENT
Start: 2024-07-12 | End: 2024-07-12

## 2024-07-12 RX ORDER — ZINC GLUCONATE 13.3 MG
LOZENGE ORAL
Qty: 60 TABLET | Refills: 0 | OUTPATIENT
Start: 2024-07-12

## 2024-07-12 RX ORDER — LEVOFLOXACIN 500 MG/1
500 TABLET, FILM COATED ORAL DAILY
Qty: 10 TABLET | Refills: 0 | Status: SHIPPED | OUTPATIENT
Start: 2024-07-12 | End: 2024-07-22

## 2024-07-12 RX ORDER — IPRATROPIUM BROMIDE AND ALBUTEROL SULFATE 2.5; .5 MG/3ML; MG/3ML
3 SOLUTION RESPIRATORY (INHALATION) ONCE
Status: COMPLETED | OUTPATIENT
Start: 2024-07-12 | End: 2024-07-12

## 2024-07-12 RX ORDER — ALBUTEROL SULFATE 0.83 MG/ML
3 SOLUTION RESPIRATORY (INHALATION) 3 TIMES DAILY
Qty: 90 ML | Refills: 11 | Status: SHIPPED | OUTPATIENT
Start: 2024-07-12

## 2024-07-12 ASSESSMENT — COLUMBIA-SUICIDE SEVERITY RATING SCALE - C-SSRS
1. IN THE PAST MONTH, HAVE YOU WISHED YOU WERE DEAD OR WISHED YOU COULD GO TO SLEEP AND NOT WAKE UP?: NO
2. HAVE YOU ACTUALLY HAD ANY THOUGHTS OF KILLING YOURSELF?: NO
6. HAVE YOU EVER DONE ANYTHING, STARTED TO DO ANYTHING, OR PREPARED TO DO ANYTHING TO END YOUR LIFE?: NO

## 2024-07-12 ASSESSMENT — PAIN - FUNCTIONAL ASSESSMENT: PAIN_FUNCTIONAL_ASSESSMENT: UNABLE TO SELF-REPORT

## 2024-07-13 NOTE — DISCHARGE INSTRUCTIONS
Thank you for choosing [facility]. It was my pleasure to be involved in your care today.         As of today's visit, based on reasonable likelihood, that it is safe for you to be discharged back to your residence to follow-up as an outpatient for ongoing management of your medical problem. You should follow-up with any referrals / primary provider as soon as possible. The contacts (number, addresses) are listed below.         Important:  Even though we think it is safe for you to go home, there is always a small chance that we are missing something that could require hospitalization.  Therefore it is very important that if you get worse or develops any new symptoms that you return here as soon as possible to be re-evaluated.  This includes return of symptoms that have resolved such as fainting, chest pain, or symptoms that could be warning signs for stroke important:  Even though we think it is safe for you to go home, there is always a small chance that we are missing something that could require hospitalization.  Therefore it is very important that if you get worse or develops any new symptoms that you return here as soon as possible to be re-evaluated.  This includes return of symptoms that have resolved such as fainting, chest pain, or symptoms that could be warning signs for stroke         Make sure your pharmacy and primary doctor is aware of any new medications prescribed today.          It is your responsibility to contact as soon as possible, and follow through with, any referrals you were given today. We do recommend you inform them you are a [facility] follow-up patient, as often they can better accommodate your need to be seen, provided their schedules allow. We will, and have, made every effort to ensure you have access to adequate follow-up specialists available.          All problems may not be able to be fixed in one ER visit. This is why timely ongoing care is important, and this  is a responsibility you share in. Further, you are free to follow up with any provider you choose, and this is not limited to our suggestion.          If cultures were obtained today, you will be contacted should anything result that would require further treatment. Please contact the ED at the number provided with questions.          Having trouble affording medications? Try [facility] [facility]! (This is not a hospital endorsed website, merely a recommendation based on my own personal experiences with [employer])

## 2024-07-13 NOTE — ED PROVIDER NOTES
HPI   Chief Complaint   Patient presents with    Aspiration     While patient eating dinner caregiver states he aspirated. Per caregiver patient has done this in the past but usually stops coughing after short time. Since episode today patient has not stopped coughing and vomiting. Patient is MRDD. Nonverbal.          History limited by:  Patient nonverbal    58-year-old male with a history of MRDD group home presents with coughing.  Caregiver states that they think he aspirated at dinner.  Has done this in the past.  Coughing up some yellow mucus.  No fevers.  No vomiting.  No other complaints verbalized by the caregiver                  [place] Coma Scale Score: 15                     Patient History   Past Medical History:   Diagnosis Date    Hypoxemia 04/16/2015    Hypoxia     No past surgical history on file.  Family History   Problem Relation Name Age of Onset    No Known Problems Mother      No Known Problems Father       Social History     Tobacco Use    Smoking status: Never    Smokeless tobacco: Never   Vaping Use    Vaping status: Never Used   Substance Use Topics    Alcohol use: Never    Drug use: Never       Physical Exam   ED Triage Vitals [07/12/24 2048]   Temp Heart Rate Respirations BP   -- 66 (!) 22 131/72      Pulse Ox Temp src Heart Rate Source Patient Position   95 % -- Monitor Sitting      BP Location FiO2 (%)     Left arm --       Physical Exam  General:  Awake, alert, no acute distress.  Head: Normocephalic, Atraumatic  Neck: Supple, trachea midline, no stridor  Skin: Warm and dry, no rashes   Lungs: Clear to auscultation bilaterally no acute respiratory distress, wet cough  CV: Regular Rate Rhythm with no obvious murmurs gallops rubs noted, no jugular venous distention, no pedal edema   Abdomen: Soft, nontender, nondistended, positive bowel sounds, no peritoneal signs  Musculoskeletal:  Full range of motion in all 4 extremities  Psychiatric:  Alert baseline mental status  ED Course & MDM    Diagnoses as of 07/12/24 2233   Atypical pneumonia       Medical Decision Making  Patient's laboratory studies unremarkable chest x-ray does not reveal an acute pneumonia at this time.  He was treated with a DuoNeb and Levaquin given his history.  Discussed the findings with the sister at bedside.  At this point I feel he is stable for discharge back to the group home.  I will prescribe Levaquin given his MRDD and transpiring events tonight and I still have a mild concern for aspiration pneumonia which has not revealed itself on chest x-ray.  Advise follow-up with his doctor but to return if condition worsens.    Procedure  Procedures     Son Kaur, DO  07/12/24 2233

## 2024-07-14 LAB
BACTERIA BLD CULT: NORMAL
GRAM STN SPEC: ABNORMAL

## 2024-07-15 ENCOUNTER — TELEPHONE (OUTPATIENT)
Dept: PRIMARY CARE | Facility: CLINIC | Age: 59
End: 2024-07-15
Payer: MEDICARE

## 2024-07-15 ENCOUNTER — HOSPITAL ENCOUNTER (EMERGENCY)
Facility: HOSPITAL | Age: 59
Discharge: HOME | End: 2024-07-15
Attending: STUDENT IN AN ORGANIZED HEALTH CARE EDUCATION/TRAINING PROGRAM
Payer: MEDICARE

## 2024-07-15 ENCOUNTER — APPOINTMENT (OUTPATIENT)
Dept: RADIOLOGY | Facility: HOSPITAL | Age: 59
End: 2024-07-15
Payer: MEDICARE

## 2024-07-15 VITALS
SYSTOLIC BLOOD PRESSURE: 98 MMHG | TEMPERATURE: 97.3 F | HEART RATE: 106 BPM | OXYGEN SATURATION: 94 % | DIASTOLIC BLOOD PRESSURE: 61 MMHG | RESPIRATION RATE: 18 BRPM

## 2024-07-15 DIAGNOSIS — R78.81 POSITIVE BLOOD CULTURES: Primary | ICD-10-CM

## 2024-07-15 DIAGNOSIS — J42 CHRONIC BRONCHITIS, UNSPECIFIED CHRONIC BRONCHITIS TYPE (MULTI): ICD-10-CM

## 2024-07-15 LAB
ANION GAP SERPL CALC-SCNC: 9 MMOL/L
BASOPHILS # BLD AUTO: 0.07 X10*3/UL (ref 0–0.1)
BASOPHILS NFR BLD AUTO: 0.9 %
BUN SERPL-MCNC: 13 MG/DL (ref 8–25)
CALCIUM SERPL-MCNC: 9.1 MG/DL (ref 8.5–10.4)
CHLORIDE SERPL-SCNC: 104 MMOL/L (ref 97–107)
CO2 SERPL-SCNC: 26 MMOL/L (ref 24–31)
CREAT SERPL-MCNC: 1.4 MG/DL (ref 0.4–1.6)
EGFRCR SERPLBLD CKD-EPI 2021: 58 ML/MIN/1.73M*2
EOSINOPHIL # BLD AUTO: 0.13 X10*3/UL (ref 0–0.7)
EOSINOPHIL NFR BLD AUTO: 1.8 %
ERYTHROCYTE [DISTWIDTH] IN BLOOD BY AUTOMATED COUNT: 15.2 % (ref 11.5–14.5)
GLUCOSE SERPL-MCNC: 97 MG/DL (ref 65–99)
HCT VFR BLD AUTO: 43 % (ref 41–52)
HGB BLD-MCNC: 14.1 G/DL (ref 13.5–17.5)
IMM GRANULOCYTES # BLD AUTO: 0.05 X10*3/UL (ref 0–0.7)
IMM GRANULOCYTES NFR BLD AUTO: 0.7 % (ref 0–0.9)
LYMPHOCYTES # BLD AUTO: 0.95 X10*3/UL (ref 1.2–4.8)
LYMPHOCYTES NFR BLD AUTO: 12.9 %
MCH RBC QN AUTO: 30.5 PG (ref 26–34)
MCHC RBC AUTO-ENTMCNC: 32.8 G/DL (ref 32–36)
MCV RBC AUTO: 93 FL (ref 80–100)
MONOCYTES # BLD AUTO: 0.8 X10*3/UL (ref 0.1–1)
MONOCYTES NFR BLD AUTO: 10.9 %
NEUTROPHILS # BLD AUTO: 5.37 X10*3/UL (ref 1.2–7.7)
NEUTROPHILS NFR BLD AUTO: 72.8 %
NRBC BLD-RTO: 0 /100 WBCS (ref 0–0)
PLATELET # BLD AUTO: 216 X10*3/UL (ref 150–450)
POTASSIUM SERPL-SCNC: 4.2 MMOL/L (ref 3.4–5.1)
RBC # BLD AUTO: 4.62 X10*6/UL (ref 4.5–5.9)
SODIUM SERPL-SCNC: 139 MMOL/L (ref 133–145)
WBC # BLD AUTO: 7.4 X10*3/UL (ref 4.4–11.3)

## 2024-07-15 PROCEDURE — 71046 X-RAY EXAM CHEST 2 VIEWS: CPT

## 2024-07-15 PROCEDURE — 36415 COLL VENOUS BLD VENIPUNCTURE: CPT | Performed by: STUDENT IN AN ORGANIZED HEALTH CARE EDUCATION/TRAINING PROGRAM

## 2024-07-15 PROCEDURE — 71046 X-RAY EXAM CHEST 2 VIEWS: CPT | Performed by: RADIOLOGY

## 2024-07-15 PROCEDURE — 36415 COLL VENOUS BLD VENIPUNCTURE: CPT

## 2024-07-15 PROCEDURE — 80048 BASIC METABOLIC PNL TOTAL CA: CPT | Performed by: STUDENT IN AN ORGANIZED HEALTH CARE EDUCATION/TRAINING PROGRAM

## 2024-07-15 PROCEDURE — 87040 BLOOD CULTURE FOR BACTERIA: CPT | Mod: WESLAB

## 2024-07-15 PROCEDURE — 85025 COMPLETE CBC W/AUTO DIFF WBC: CPT | Performed by: STUDENT IN AN ORGANIZED HEALTH CARE EDUCATION/TRAINING PROGRAM

## 2024-07-15 PROCEDURE — 99283 EMERGENCY DEPT VISIT LOW MDM: CPT | Mod: 25

## 2024-07-15 RX ORDER — ALBUTEROL SULFATE 0.83 MG/ML
3 SOLUTION RESPIRATORY (INHALATION) 3 TIMES DAILY
Qty: 270 ML | Refills: 3 | Status: SHIPPED | OUTPATIENT
Start: 2024-07-15 | End: 2024-11-12

## 2024-07-15 RX ORDER — ALBUTEROL SULFATE 0.83 MG/ML
3 SOLUTION RESPIRATORY (INHALATION) 3 TIMES DAILY
Qty: 270 ML | Refills: 3 | Status: SHIPPED | OUTPATIENT
Start: 2024-07-15 | End: 2024-07-15 | Stop reason: SDUPTHER

## 2024-07-15 NOTE — DISCHARGE INSTRUCTIONS
You were seen in the Emergency Department today for abnormal blood cultures.  Like we discussed, these will be followed by the infectious disease specialist.  I have placed a referral for outpatient follow-up with them as well as provided you with their information for outpatient care.  In the event that your cultures are found to be truly positive and not a contaminant, they will call you to return to the emergency department for IV antibiotics.

## 2024-07-15 NOTE — ED TRIAGE NOTES
[facility] called patient's guardian for patient to go to ER for lung infection today. On Friday, went to [facility] for evaluation of coughing up large amount of sputum and diagnosed with pneumonia, prescribed Levofloxacin 500 mg daily for 10 days.

## 2024-07-15 NOTE — ED PROVIDER NOTES
HPI   Chief Complaint   Patient presents with    Cough     [facility] called patient's guardian for patient to go to ER for lung infection today. On Friday, went to [facility] for evaluation of coughing up large amount of sputum and diagnosed with pneumonia, prescribed Levofloxacin 500 mg daily for 10 days.       HPI  Patient is a 58-year-old male with MRDD who presents to ED for abnormal lab work.  Patient recently treated for possible aspiration pneumonia with Levaquin.  Blood cultures obtained at that time were shown to be positive.  Patient was informed and directed to come to ED for evaluation.  Patient is nonverbal and does not follow commands.  Patient is accompanied by caretaker who states patient's condition has improved since starting the antibiotics.  Patient appears at his baseline.  Does not appear in respiratory distress.                  Jackson Coma Scale Score: 12                     Patient History   Past Medical History:   Diagnosis Date    Hypoxemia 04/16/2015    Hypoxia     No past surgical history on file.  Family History   Problem Relation Name Age of Onset    No Known Problems Mother      No Known Problems Father       Social History     Tobacco Use    Smoking status: Never    Smokeless tobacco: Never   Vaping Use    Vaping status: Never Used   Substance Use Topics    Alcohol use: Never    Drug use: Never       Physical Exam   ED Triage Vitals [07/15/24 1020]   Temperature Heart Rate Respirations BP   36.3 °C (97.3 °F) (!) 106 18 98/61      Pulse Ox Temp Source Heart Rate Source Patient Position   94 % Temporal Monitor;Brachial Sitting      BP Location FiO2 (%)     Left arm --       Physical Exam  Vitals reviewed.   Constitutional:       Appearance: Normal appearance.   HENT:      Head: Normocephalic and atraumatic.   Eyes:      Extraocular Movements: Extraocular movements intact.   Cardiovascular:      Rate and Rhythm: Normal rate and regular rhythm.   Pulmonary:      Effort: Pulmonary effort is  normal.      Breath sounds: Normal breath sounds.   Abdominal:      General: Abdomen is flat.      Palpations: Abdomen is soft.      Tenderness: There is no abdominal tenderness.   Musculoskeletal:         General: Normal range of motion.      Cervical back: Normal range of motion and neck supple.   Skin:     General: Skin is warm and dry.   Neurological:      General: No focal deficit present.      Mental Status: He is alert and oriented to person, place, and time.   Psychiatric:         Mood and Affect: Mood normal.         Behavior: Behavior normal.         ED Course & MDM   Diagnoses as of 07/15/24 1546   Positive blood cultures       Medical Decision Making  Parts of this chart have been completed using voice recognition software. Please excuse any errors of transcription.  My thought process and reason for plan has been formulated from the time that I saw the patient until the time of disposition and is not specific to one specific moment during their visit and furthermore my MDM encompasses this entire chart and not only this text box.    HPI:   Detailed above.    Exam:   A medically appropriate exam performed, outlined above, given the known history and presentation.    History obtained from:   Patient    EKG/Cardiac monitor:     Social Determinants of Health considered during this visit:       Medications given during visit:  Medications - No data to display     Diagnostic/tests:  Labs Reviewed   CBC WITH AUTO DIFFERENTIAL - Abnormal       Result Value    WBC 7.4      nRBC 0.0      RBC 4.62      Hemoglobin 14.1      Hematocrit 43.0      MCV 93      MCH 30.5      MCHC 32.8      RDW 15.2 (*)     Platelets 216      Neutrophils % 72.8      Immature Granulocytes %, Automated 0.7      Lymphocytes % 12.9      Monocytes % 10.9      Eosinophils % 1.8      Basophils % 0.9      Neutrophils Absolute 5.37      Immature Granulocytes Absolute, Automated 0.05      Lymphocytes Absolute 0.95 (*)     Monocytes Absolute 0.80       Eosinophils Absolute 0.13      Basophils Absolute 0.07     BASIC METABOLIC PANEL - Abnormal    Glucose 97      Sodium 139      Potassium 4.2      Chloride 104      Bicarbonate 26      Urea Nitrogen 13      Creatinine 1.40      eGFR 58 (*)     Calcium 9.1      Anion Gap 9     BLOOD CULTURE   BLOOD CULTURE      XR chest 2 views   Final Result   1.  No active cardiopulmonary disease.  There has not been   significant interval change from the prior exam.        Signed by: Brian Hoover 7/15/2024 11:34 AM   Dictation workstation:   [id number]           Differential Diagnosis:       Consultations:      Procedures:      Critical Care:      Referrals:      Discharge Prescriptions:      MDM Summary:  Dr. Handley from infectious disease was consulted.  We discussed all aspects of this case and decided together the patient is safe for discharge and will follow-up with infectious disease as an outpatient.    We have discussed the diagnosis and risks, and we agree with discharging home to follow-up with appropriate physician as directed. We also discussed returning to the Emergency Department immediately if new or worsening symptoms occur. We have discussed the symptoms which are most concerning that necessitate immediate return. Pt symptoms have been well controlled here and the patient is safe for discharge with appropriate outpatient follow up. The patient has verbalized understanding to return to ER without delay for new or worsening pains or for any other symptoms or concerns. I utilized the discharge clinical management tool provided [employer] to help estimate risk of negative outcome for this patient.      Disposition:  The patient was discharged      Procedure  Procedures     Felice Boston PA-C  07/15/24 9934

## 2024-07-18 DIAGNOSIS — F72 SEVERE INTELLECTUAL DISABILITY: ICD-10-CM

## 2024-07-18 DIAGNOSIS — K21.9 GASTROESOPHAGEAL REFLUX DISEASE, UNSPECIFIED WHETHER ESOPHAGITIS PRESENT: ICD-10-CM

## 2024-07-18 DIAGNOSIS — E78.5 HYPERLIPIDEMIA, UNSPECIFIED HYPERLIPIDEMIA TYPE: ICD-10-CM

## 2024-07-18 DIAGNOSIS — J42 CHRONIC BRONCHITIS, UNSPECIFIED CHRONIC BRONCHITIS TYPE (MULTI): ICD-10-CM

## 2024-07-18 DIAGNOSIS — E03.9 HYPOTHYROIDISM, UNSPECIFIED TYPE: ICD-10-CM

## 2024-07-18 LAB
BACTERIA BLD AEROBE CULT: ABNORMAL
BACTERIA BLD CULT: ABNORMAL
GRAM STN SPEC: ABNORMAL

## 2024-07-18 RX ORDER — LORATADINE 10 MG/1
10 TABLET ORAL DAILY
Qty: 90 TABLET | Refills: 1 | Status: SHIPPED | OUTPATIENT
Start: 2024-07-18

## 2024-07-18 RX ORDER — ERGOCALCIFEROL 1.25 MG/1
50000 CAPSULE ORAL
Qty: 12 CAPSULE | Refills: 1 | Status: SHIPPED | OUTPATIENT
Start: 2024-07-21

## 2024-07-18 RX ORDER — ZINC GLUCONATE 13.3 MG
200 LOZENGE ORAL 2 TIMES DAILY
Qty: 90 TABLET | Refills: 1 | Status: SHIPPED | OUTPATIENT
Start: 2024-07-18

## 2024-07-18 RX ORDER — DOCUSATE SODIUM 250 MG
250 CAPSULE ORAL DAILY
Qty: 90 CAPSULE | Refills: 1 | Status: SHIPPED | OUTPATIENT
Start: 2024-07-18

## 2024-07-18 RX ORDER — ATORVASTATIN CALCIUM 20 MG/1
20 TABLET, FILM COATED ORAL DAILY
Qty: 90 TABLET | Refills: 1 | Status: SHIPPED | OUTPATIENT
Start: 2024-07-18

## 2024-07-18 RX ORDER — LEVOTHYROXINE SODIUM 112 UG/1
112 TABLET ORAL DAILY
Qty: 90 TABLET | Refills: 1 | Status: SHIPPED | OUTPATIENT
Start: 2024-07-18

## 2024-07-18 RX ORDER — ALBUTEROL SULFATE 0.83 MG/ML
3 SOLUTION RESPIRATORY (INHALATION) 3 TIMES DAILY
Qty: 270 ML | Refills: 3 | Status: CANCELLED | OUTPATIENT
Start: 2024-07-18 | End: 2024-11-15

## 2024-07-19 ENCOUNTER — TELEPHONE (OUTPATIENT)
Dept: PHARMACY | Facility: HOSPITAL | Age: 59
End: 2024-07-19
Payer: MEDICARE

## 2024-07-19 DIAGNOSIS — J45.909 MODERATE ASTHMA, UNSPECIFIED WHETHER COMPLICATED, UNSPECIFIED WHETHER PERSISTENT (HHS-HCC): ICD-10-CM

## 2024-07-19 LAB
BACTERIA BLD CULT: NORMAL
BACTERIA BLD CULT: NORMAL

## 2024-07-19 RX ORDER — ALBUTEROL SULFATE 90 UG/1
2 AEROSOL, METERED RESPIRATORY (INHALATION) EVERY 4 HOURS PRN
Qty: 18 G | Refills: 3 | Status: SHIPPED | OUTPATIENT
Start: 2024-07-19

## 2024-07-19 NOTE — PROGRESS NOTES
EDPD Note: Rapid Result Review    Reviewed Mr./Mrs./Ms. Cyril Hernandez 's chart regarding a positive blood culture/result that was taken during their recent emergency room visit. Patient recently treated for possible aspiration pneumonia with Levaquin. Blood cultures obtained at that time were shown to be positive. Patient was informed and directed to come to ED for evaluation. Patient came back to ER for assessment. Therefore, patient was not contacted as education was provider prior to discharge.     Susceptibility data from last 90 days.  Collected Specimen Info Organism Clindamycin Erythromycin Oxacillin Tetracycline Trimethoprim/Sulfamethoxazole Vancomycin   07/12/24 Blood culture from Peripheral Venipuncture Staphylococcus epidermidis  S  S  S  S  S  S   07/12/24 Blood culture from Peripheral Venipuncture Staphylococcus epidermidis             No further follow up needed from EDPD Team.     Martha Pepper, ShaniD

## 2024-07-21 LAB
BACTERIA BLD AEROBE CULT: ABNORMAL
BACTERIA BLD CULT: ABNORMAL
GRAM STN SPEC: ABNORMAL

## 2024-07-26 ENCOUNTER — OFFICE VISIT (OUTPATIENT)
Dept: PRIMARY CARE | Facility: CLINIC | Age: 59
End: 2024-07-26
Payer: MEDICARE

## 2024-07-26 VITALS — RESPIRATION RATE: 16 BRPM | TEMPERATURE: 97.3 F | WEIGHT: 133 LBS | HEIGHT: 62 IN | BODY MASS INDEX: 24.48 KG/M2

## 2024-07-26 DIAGNOSIS — F72 SEVERE INTELLECTUAL DISABILITY: ICD-10-CM

## 2024-07-26 DIAGNOSIS — R05.9 COUGH, UNSPECIFIED TYPE: Primary | ICD-10-CM

## 2024-07-26 PROCEDURE — 99214 OFFICE O/P EST MOD 30 MIN: CPT | Performed by: FAMILY MEDICINE

## 2024-07-26 PROCEDURE — 3008F BODY MASS INDEX DOCD: CPT | Performed by: FAMILY MEDICINE

## 2024-07-26 ASSESSMENT — ENCOUNTER SYMPTOMS
LOSS OF SENSATION IN FEET: 0
DEPRESSION: 0
OCCASIONAL FEELINGS OF UNSTEADINESS: 0

## 2024-07-26 ASSESSMENT — COLUMBIA-SUICIDE SEVERITY RATING SCALE - C-SSRS
1. IN THE PAST MONTH, HAVE YOU WISHED YOU WERE DEAD OR WISHED YOU COULD GO TO SLEEP AND NOT WAKE UP?: NO
6. HAVE YOU EVER DONE ANYTHING, STARTED TO DO ANYTHING, OR PREPARED TO DO ANYTHING TO END YOUR LIFE?: NO
2. HAVE YOU ACTUALLY HAD ANY THOUGHTS OF KILLING YOURSELF?: NO

## 2024-07-26 ASSESSMENT — PATIENT HEALTH QUESTIONNAIRE - PHQ9
SUM OF ALL RESPONSES TO PHQ9 QUESTIONS 1 AND 2: 0
1. LITTLE INTEREST OR PLEASURE IN DOING THINGS: NOT AT ALL
2. FEELING DOWN, DEPRESSED OR HOPELESS: NOT AT ALL

## 2024-07-26 ASSESSMENT — PAIN SCALES - GENERAL: PAINLEVEL: 0-NO PAIN

## 2024-07-26 NOTE — PROGRESS NOTES
"Subjective   Patient ID: Elliott Hernandez is a 58 y.o. male who presents for Follow-up (Pt is here with his caregiver for a er follow up of pneumonia aspiration. ).    HPI pt was in er on 7/15 for recheck of positive blood cultures after being tx recently for aspiration pneumonia.  He was doing better on abx but was instructed to go to er for the positive blood cultures.  He was on Levaquin 500 mg daily for 10 days.  Cxr with no acute changes and cbc wnl. New blood cultures taken x 2 with no growth.  (Initial cultures positive for staph epidermitits)  His symptoms have resolved and doing well per caregiver and family member.     Review of Systems no fever/vomiting/lethargy/cough    Objective   Temp 36.3 °C (97.3 °F) (Temporal)   Resp 16   Ht 1.575 m (5' 2\")   Wt 60.3 kg (133 lb)   BMI 24.33 kg/m²     Physical Exam  Constitutional:       General: He is not in acute distress.     Appearance: Normal appearance.   Cardiovascular:      Rate and Rhythm: Normal rate and regular rhythm.      Heart sounds: No murmur heard.  Pulmonary:      Breath sounds: Normal breath sounds. No wheezing.   Neurological:      Mental Status: He is alert.         Assessment/Plan   Problem List Items Addressed This Visit             ICD-10-CM    Severe intellectual disability F72     Other Visit Diagnoses         Codes    Cough, unspecified type    -  Primary R05.9        Reviewed hospital records x 2 and xr/culture reports.  It appears his initial culture may have been contamination and has otherwise been treated>  will continue to monitor for any recurrent symptoms and follow up prn.  Total time 30 min       "

## 2024-08-05 ENCOUNTER — APPOINTMENT (OUTPATIENT)
Dept: INFECTIOUS DISEASES | Facility: CLINIC | Age: 59
End: 2024-08-05
Payer: MEDICARE

## 2024-08-08 ENCOUNTER — TELEPHONE (OUTPATIENT)
Dept: PRIMARY CARE | Facility: CLINIC | Age: 59
End: 2024-08-08
Payer: MEDICARE

## 2024-08-08 DIAGNOSIS — N39.0 URINARY TRACT INFECTION WITHOUT HEMATURIA, SITE UNSPECIFIED: Primary | ICD-10-CM

## 2024-08-08 RX ORDER — NITROFURANTOIN 25; 75 MG/1; MG/1
100 CAPSULE ORAL 2 TIMES DAILY
Qty: 14 CAPSULE | Refills: 0 | Status: SHIPPED | OUTPATIENT
Start: 2024-08-08 | End: 2024-08-15

## 2024-08-13 ENCOUNTER — APPOINTMENT (OUTPATIENT)
Dept: CARDIOLOGY | Facility: CLINIC | Age: 59
End: 2024-08-13
Payer: MEDICAID

## 2024-08-15 DIAGNOSIS — G44.209 ACUTE NON INTRACTABLE TENSION-TYPE HEADACHE: ICD-10-CM

## 2024-08-16 RX ORDER — ACETAMINOPHEN 500 MG
TABLET ORAL
Qty: 30 TABLET | Refills: 0 | Status: SHIPPED | OUTPATIENT
Start: 2024-08-16

## 2024-08-27 ENCOUNTER — OFFICE VISIT (OUTPATIENT)
Dept: PRIMARY CARE | Facility: CLINIC | Age: 59
End: 2024-08-27
Payer: MEDICARE

## 2024-08-27 ENCOUNTER — TELEPHONE (OUTPATIENT)
Dept: PRIMARY CARE | Facility: CLINIC | Age: 59
End: 2024-08-27

## 2024-08-27 VITALS
WEIGHT: 129 LBS | HEIGHT: 60 IN | TEMPERATURE: 98 F | RESPIRATION RATE: 20 BRPM | DIASTOLIC BLOOD PRESSURE: 68 MMHG | BODY MASS INDEX: 25.32 KG/M2 | SYSTOLIC BLOOD PRESSURE: 118 MMHG

## 2024-08-27 DIAGNOSIS — I10 ESSENTIAL (PRIMARY) HYPERTENSION: ICD-10-CM

## 2024-08-27 DIAGNOSIS — Z12.5 SCREENING FOR MALIGNANT NEOPLASM OF PROSTATE: ICD-10-CM

## 2024-08-27 DIAGNOSIS — E55.9 VITAMIN D DEFICIENCY: ICD-10-CM

## 2024-08-27 DIAGNOSIS — R53.83 OTHER FATIGUE: ICD-10-CM

## 2024-08-27 DIAGNOSIS — Z00.00 ROUTINE GENERAL MEDICAL EXAMINATION AT A HEALTH CARE FACILITY: Primary | ICD-10-CM

## 2024-08-27 DIAGNOSIS — R35.0 URINARY FREQUENCY: ICD-10-CM

## 2024-08-27 DIAGNOSIS — Z95.0 CARDIAC PACEMAKER: ICD-10-CM

## 2024-08-27 DIAGNOSIS — K59.00 CONSTIPATION, UNSPECIFIED CONSTIPATION TYPE: ICD-10-CM

## 2024-08-27 DIAGNOSIS — Z12.11 SCREENING FOR COLON CANCER: ICD-10-CM

## 2024-08-27 DIAGNOSIS — Q90.9 COMPLETE TRISOMY 21 SYNDROME (HHS-HCC): ICD-10-CM

## 2024-08-27 DIAGNOSIS — H10.31 ACUTE BACTERIAL CONJUNCTIVITIS OF RIGHT EYE: Primary | ICD-10-CM

## 2024-08-27 LAB
25(OH)D3 SERPL-MCNC: 89 NG/ML (ref 31–100)
ALBUMIN SERPL-MCNC: 3.6 G/DL (ref 3.5–5)
ALP BLD-CCNC: 91 U/L (ref 35–125)
ALT SERPL-CCNC: 34 U/L (ref 5–40)
ANION GAP SERPL CALC-SCNC: 15 MMOL/L
AST SERPL-CCNC: 27 U/L (ref 5–40)
BASOPHILS # BLD AUTO: 0.07 X10*3/UL (ref 0–0.1)
BASOPHILS NFR BLD AUTO: 0.9 %
BILIRUB SERPL-MCNC: 0.5 MG/DL (ref 0.1–1.2)
BUN SERPL-MCNC: 21 MG/DL (ref 8–25)
CALCIUM SERPL-MCNC: 9.2 MG/DL (ref 8.5–10.4)
CHLORIDE SERPL-SCNC: 104 MMOL/L (ref 97–107)
CHOLEST SERPL-MCNC: 161 MG/DL (ref 133–200)
CHOLEST/HDLC SERPL: 2.6 {RATIO}
CO2 SERPL-SCNC: 18 MMOL/L (ref 24–31)
CREAT SERPL-MCNC: 1.5 MG/DL (ref 0.4–1.6)
EGFRCR SERPLBLD CKD-EPI 2021: 53 ML/MIN/1.73M*2
EOSINOPHIL # BLD AUTO: 0.09 X10*3/UL (ref 0–0.7)
EOSINOPHIL NFR BLD AUTO: 1.2 %
ERYTHROCYTE [DISTWIDTH] IN BLOOD BY AUTOMATED COUNT: 15.9 % (ref 11.5–14.5)
GLUCOSE SERPL-MCNC: 84 MG/DL (ref 65–99)
HCT VFR BLD AUTO: 50 % (ref 41–52)
HDLC SERPL-MCNC: 62 MG/DL
HGB BLD-MCNC: 15.4 G/DL (ref 13.5–17.5)
IMM GRANULOCYTES # BLD AUTO: 0.03 X10*3/UL (ref 0–0.7)
IMM GRANULOCYTES NFR BLD AUTO: 0.4 % (ref 0–0.9)
LDLC SERPL CALC-MCNC: 88 MG/DL (ref 65–130)
LYMPHOCYTES # BLD AUTO: 0.79 X10*3/UL (ref 1.2–4.8)
LYMPHOCYTES NFR BLD AUTO: 10.4 %
MCH RBC QN AUTO: 30.9 PG (ref 26–34)
MCHC RBC AUTO-ENTMCNC: 30.8 G/DL (ref 32–36)
MCV RBC AUTO: 100 FL (ref 80–100)
MONOCYTES # BLD AUTO: 0.69 X10*3/UL (ref 0.1–1)
MONOCYTES NFR BLD AUTO: 9.1 %
NEUTROPHILS # BLD AUTO: 5.91 X10*3/UL (ref 1.2–7.7)
NEUTROPHILS NFR BLD AUTO: 78 %
NRBC BLD-RTO: 0 /100 WBCS (ref 0–0)
PLATELET # BLD AUTO: 211 X10*3/UL (ref 150–450)
POC APPEARANCE, URINE: CLEAR
POC BILIRUBIN, URINE: NEGATIVE
POC BLOOD, URINE: NEGATIVE
POC COLOR, URINE: YELLOW
POC GLUCOSE, URINE: NEGATIVE MG/DL
POC KETONES, URINE: NEGATIVE MG/DL
POC LEUKOCYTES, URINE: NEGATIVE
POC NITRITE,URINE: NEGATIVE
POC PH, URINE: 6 PH
POC PROTEIN, URINE: NEGATIVE MG/DL
POC SPECIFIC GRAVITY, URINE: 1.01
POC UROBILINOGEN, URINE: 0.2 EU/DL
POTASSIUM SERPL-SCNC: 5.2 MMOL/L (ref 3.4–5.1)
PROT SERPL-MCNC: 7.1 G/DL (ref 5.9–7.9)
PSA SERPL-MCNC: <0.1 NG/ML
RBC # BLD AUTO: 4.99 X10*6/UL (ref 4.5–5.9)
SODIUM SERPL-SCNC: 137 MMOL/L (ref 133–145)
TRIGL SERPL-MCNC: 53 MG/DL (ref 40–150)
TSH SERPL DL<=0.05 MIU/L-ACNC: 2.9 MIU/L (ref 0.27–4.2)
WBC # BLD AUTO: 7.6 X10*3/UL (ref 4.4–11.3)

## 2024-08-27 PROCEDURE — 80061 LIPID PANEL: CPT | Performed by: FAMILY MEDICINE

## 2024-08-27 PROCEDURE — 3074F SYST BP LT 130 MM HG: CPT | Performed by: FAMILY MEDICINE

## 2024-08-27 PROCEDURE — 99214 OFFICE O/P EST MOD 30 MIN: CPT | Performed by: FAMILY MEDICINE

## 2024-08-27 PROCEDURE — 87086 URINE CULTURE/COLONY COUNT: CPT | Mod: WESLAB | Performed by: FAMILY MEDICINE

## 2024-08-27 PROCEDURE — 99215 OFFICE O/P EST HI 40 MIN: CPT | Performed by: FAMILY MEDICINE

## 2024-08-27 PROCEDURE — 36415 COLL VENOUS BLD VENIPUNCTURE: CPT | Performed by: FAMILY MEDICINE

## 2024-08-27 PROCEDURE — 82306 VITAMIN D 25 HYDROXY: CPT | Performed by: FAMILY MEDICINE

## 2024-08-27 PROCEDURE — G0439 PPPS, SUBSEQ VISIT: HCPCS | Performed by: FAMILY MEDICINE

## 2024-08-27 PROCEDURE — 84153 ASSAY OF PSA TOTAL: CPT | Performed by: FAMILY MEDICINE

## 2024-08-27 PROCEDURE — 85025 COMPLETE CBC W/AUTO DIFF WBC: CPT | Performed by: FAMILY MEDICINE

## 2024-08-27 PROCEDURE — 81002 URINALYSIS NONAUTO W/O SCOPE: CPT | Performed by: FAMILY MEDICINE

## 2024-08-27 PROCEDURE — 1036F TOBACCO NON-USER: CPT | Performed by: FAMILY MEDICINE

## 2024-08-27 PROCEDURE — 3008F BODY MASS INDEX DOCD: CPT | Performed by: FAMILY MEDICINE

## 2024-08-27 PROCEDURE — 84443 ASSAY THYROID STIM HORMONE: CPT | Performed by: FAMILY MEDICINE

## 2024-08-27 PROCEDURE — 3078F DIAST BP <80 MM HG: CPT | Performed by: FAMILY MEDICINE

## 2024-08-27 PROCEDURE — 80053 COMPREHEN METABOLIC PANEL: CPT | Performed by: FAMILY MEDICINE

## 2024-08-27 RX ORDER — CIPROFLOXACIN HYDROCHLORIDE 3 MG/ML
1 SOLUTION/ DROPS OPHTHALMIC 3 TIMES DAILY
Qty: 5 ML | Refills: 0 | Status: SHIPPED | OUTPATIENT
Start: 2024-08-27 | End: 2024-09-06

## 2024-08-27 RX ORDER — NITROFURANTOIN 25; 75 MG/1; MG/1
100 CAPSULE ORAL 2 TIMES DAILY
COMMUNITY

## 2024-08-27 ASSESSMENT — ENCOUNTER SYMPTOMS
OCCASIONAL FEELINGS OF UNSTEADINESS: 0
LOSS OF SENSATION IN FEET: 0
DEPRESSION: 0

## 2024-08-27 ASSESSMENT — ACTIVITIES OF DAILY LIVING (ADL)
BATHING: NEEDS ASSISTANCE
MANAGING_FINANCES: TOTAL CARE
DOING_HOUSEWORK: TOTAL CARE
TAKING_MEDICATION: TOTAL CARE
GROCERY_SHOPPING: TOTAL CARE
DRESSING: NEEDS ASSISTANCE

## 2024-08-27 ASSESSMENT — PATIENT HEALTH QUESTIONNAIRE - PHQ9
1. LITTLE INTEREST OR PLEASURE IN DOING THINGS: NOT AT ALL
SUM OF ALL RESPONSES TO PHQ9 QUESTIONS 1 AND 2: 0
2. FEELING DOWN, DEPRESSED OR HOPELESS: NOT AT ALL

## 2024-08-27 ASSESSMENT — PAIN SCALES - GENERAL: PAINLEVEL: 0-NO PAIN

## 2024-08-27 NOTE — PROGRESS NOTES
"Subjective   Patient ID: Elliott Hernandez is a 59 y.o. male who presents for Annual Exam (HERE WITH CARE TAKER , HERE FOR A PHYSICAL AND FASTING BLOOD WORK).    HPI he has been holding his urine and having large outputs at times.  Taking in good fluid amounts.  Occasional incont.   Some constipation and has bm eveery other day they would like Miralax increased to daily.   Review of Systemssee HPI    Objective   /68 (BP Location: Right arm, Patient Position: Sitting, BP Cuff Size: Adult)   Temp 36.7 °C (98 °F) (Skin)   Resp 20   Ht 1.499 m (4' 11\")   Wt 58.5 kg (129 lb)   BMI 26.05 kg/m²     Physical Exam  Vitals and nursing note reviewed.   Constitutional:       General: He is not in acute distress.  HENT:      Right Ear: Tympanic membrane and ear canal normal.      Left Ear: Tympanic membrane and ear canal normal.      Nose: Nose normal. No rhinorrhea.      Mouth/Throat:      Pharynx: Oropharynx is clear. No oropharyngeal exudate or posterior oropharyngeal erythema.      Comments: Dentition wnl  Eyes:      General:         Right eye: No discharge.         Left eye: No discharge.      Conjunctiva/sclera: Conjunctivae normal.   Neck:      Vascular: No carotid bruit.   Cardiovascular:      Rate and Rhythm: Normal rate and regular rhythm.      Heart sounds: Normal heart sounds. No murmur heard.  Pulmonary:      Breath sounds: Normal breath sounds. No wheezing or rhonchi.   Abdominal:      General: Bowel sounds are normal. There is no distension.      Palpations: Abdomen is soft. There is no mass.      Tenderness: There is no abdominal tenderness. There is no guarding or rebound.      Hernia: No hernia is present.   Musculoskeletal:         General: No swelling or tenderness. Normal range of motion.      Cervical back: Normal range of motion and neck supple.   Lymphadenopathy:      Cervical: No cervical adenopathy.   Skin:     General: Skin is warm.      Findings: No rash.   Neurological:      General: No " focal deficit present.      Mental Status: He is alert.         Assessment/Plan   Problem List Items Addressed This Visit             ICD-10-CM    Vitamin D deficiency E55.9    Relevant Orders    Vitamin D 25-Hydroxy,Total (for eval of Vitamin D levels)    Essential (primary) hypertension I10    Complete trisomy 21 syndrome (HHS-HCC) Q90.9    Cardiac pacemaker Z95.0     Other Visit Diagnoses         Codes    Routine general medical examination at a health care facility    -  Primary Z00.00    Relevant Orders    CBC and Auto Differential    Comprehensive Metabolic Panel    TSH with reflex to Free T4 if abnormal    Lipid Panel    Constipation, unspecified constipation type     K59.00    Urinary frequency     R35.0    Relevant Orders    POCT UA (nonautomated) manually resulted    Urine Culture    Screening for colon cancer     Z12.11    Relevant Orders    Cologuard® colon cancer screening    Screening for malignant neoplasm of prostate     Z12.5    Relevant Orders    Prostate Specific Antigen    Other fatigue     R53.83    Relevant Orders    CBC and Auto Differential

## 2024-08-28 ENCOUNTER — TELEPHONE (OUTPATIENT)
Dept: CARDIOLOGY | Facility: CLINIC | Age: 59
End: 2024-08-28
Payer: MEDICARE

## 2024-08-28 DIAGNOSIS — Z95.0 CARDIAC PACEMAKER: Primary | ICD-10-CM

## 2024-08-28 DIAGNOSIS — E87.5 SERUM POTASSIUM ELEVATED: ICD-10-CM

## 2024-08-28 DIAGNOSIS — I44.2 CHB (COMPLETE HEART BLOCK) (MULTI): ICD-10-CM

## 2024-08-28 LAB — BACTERIA UR CULT: NORMAL

## 2024-08-29 ENCOUNTER — HOSPITAL ENCOUNTER (OUTPATIENT)
Dept: CARDIOLOGY | Facility: CLINIC | Age: 59
Discharge: HOME | End: 2024-08-29
Payer: MEDICARE

## 2024-08-29 DIAGNOSIS — Z95.0 CARDIAC PACEMAKER: ICD-10-CM

## 2024-08-29 DIAGNOSIS — I44.2 COMPLETE HEART BLOCK (MULTI): ICD-10-CM

## 2024-08-29 PROCEDURE — 93283 PRGRMG EVAL IMPLANTABLE DFB: CPT

## 2024-09-04 DIAGNOSIS — F72 SEVERE INTELLECTUAL DISABILITY: ICD-10-CM

## 2024-09-04 LAB — NONINV COLON CA DNA+OCC BLD SCRN STL QL: NORMAL

## 2024-09-07 RX ORDER — POLYETHYLENE GLYCOL 3350 17 G/17G
POWDER, FOR SOLUTION ORAL
Qty: 100 EACH | Refills: 1 | Status: SHIPPED | OUTPATIENT
Start: 2024-09-07

## 2024-09-11 ENCOUNTER — APPOINTMENT (OUTPATIENT)
Dept: INFECTIOUS DISEASES | Facility: CLINIC | Age: 59
End: 2024-09-11
Payer: MEDICARE

## 2024-09-11 DIAGNOSIS — E87.5 HYPERKALEMIA: Primary | ICD-10-CM

## 2024-09-12 ENCOUNTER — TELEPHONE (OUTPATIENT)
Dept: PRIMARY CARE | Facility: CLINIC | Age: 59
End: 2024-09-12
Payer: MEDICARE

## 2024-09-12 DIAGNOSIS — E87.5 HYPERKALEMIA: ICD-10-CM

## 2024-09-27 DIAGNOSIS — K21.9 GASTROESOPHAGEAL REFLUX DISEASE, UNSPECIFIED WHETHER ESOPHAGITIS PRESENT: ICD-10-CM

## 2024-09-28 LAB — BODY SURFACE AREA: 1.56 M2

## 2024-10-06 RX ORDER — ZINC GLUCONATE 13.3 MG
LOZENGE ORAL
Qty: 180 TABLET | Refills: 3 | Status: SHIPPED | OUTPATIENT
Start: 2024-10-06

## 2024-10-17 ENCOUNTER — TELEPHONE (OUTPATIENT)
Dept: PRIMARY CARE | Facility: CLINIC | Age: 59
End: 2024-10-17

## 2024-10-17 ENCOUNTER — OFFICE VISIT (OUTPATIENT)
Dept: PRIMARY CARE | Facility: CLINIC | Age: 59
End: 2024-10-17
Payer: MEDICARE

## 2024-10-17 VITALS
BODY MASS INDEX: 26.26 KG/M2 | RESPIRATION RATE: 17 BRPM | DIASTOLIC BLOOD PRESSURE: 62 MMHG | WEIGHT: 130 LBS | SYSTOLIC BLOOD PRESSURE: 102 MMHG

## 2024-10-17 DIAGNOSIS — J42 CHRONIC BRONCHITIS, UNSPECIFIED CHRONIC BRONCHITIS TYPE (MULTI): Primary | ICD-10-CM

## 2024-10-17 DIAGNOSIS — E87.5 HYPERKALEMIA: ICD-10-CM

## 2024-10-17 DIAGNOSIS — K21.9 CHRONIC GERD: Primary | ICD-10-CM

## 2024-10-17 DIAGNOSIS — Z23 ENCOUNTER FOR IMMUNIZATION: ICD-10-CM

## 2024-10-17 PROCEDURE — 3078F DIAST BP <80 MM HG: CPT | Performed by: NURSE PRACTITIONER

## 2024-10-17 PROCEDURE — 99213 OFFICE O/P EST LOW 20 MIN: CPT | Performed by: NURSE PRACTITIONER

## 2024-10-17 PROCEDURE — 90673 RIV3 VACCINE NO PRESERV IM: CPT | Performed by: NURSE PRACTITIONER

## 2024-10-17 PROCEDURE — 36415 COLL VENOUS BLD VENIPUNCTURE: CPT | Performed by: NURSE PRACTITIONER

## 2024-10-17 PROCEDURE — G0008 ADMIN INFLUENZA VIRUS VAC: HCPCS | Performed by: NURSE PRACTITIONER

## 2024-10-17 PROCEDURE — 3074F SYST BP LT 130 MM HG: CPT | Performed by: NURSE PRACTITIONER

## 2024-10-17 RX ORDER — DOXYCYCLINE 100 MG/1
100 CAPSULE ORAL 2 TIMES DAILY
Qty: 20 CAPSULE | Refills: 0 | Status: ON HOLD | OUTPATIENT
Start: 2024-10-17 | End: 2024-10-27

## 2024-10-17 RX ORDER — FAMOTIDINE 20 MG/1
20 TABLET, FILM COATED ORAL 2 TIMES DAILY
Qty: 180 TABLET | Refills: 1 | Status: ON HOLD | OUTPATIENT
Start: 2024-10-17 | End: 2025-04-15

## 2024-10-17 RX ORDER — BENZONATATE 100 MG/1
100 CAPSULE ORAL 3 TIMES DAILY PRN
Qty: 42 CAPSULE | Refills: 0 | Status: ON HOLD | OUTPATIENT
Start: 2024-10-17 | End: 2024-11-16

## 2024-10-17 ASSESSMENT — ENCOUNTER SYMPTOMS
OCCASIONAL FEELINGS OF UNSTEADINESS: 0
LOSS OF SENSATION IN FEET: 0
DEPRESSION: 0

## 2024-10-17 ASSESSMENT — PATIENT HEALTH QUESTIONNAIRE - PHQ9: 1. LITTLE INTEREST OR PLEASURE IN DOING THINGS: NOT AT ALL

## 2024-10-17 NOTE — PROGRESS NOTES
Subjective   Patient ID: Elliott Hernandez is a 59 y.o. male who presents for Upper Respitory .  Here with  and care taker. Was at work program yesterday and had diarrhia, then   HPI Green discharge, hitting self on chin when he gets agitated. Non verbual. Tongue thrusting ,  holding plastic toy   Needs nebulizer tubing and mask using 3 times daily. Drinking fluids,   Needs lab recheck for potassium   Review of Systems    Objective   There were no vitals taken for this visit.    Physical Exam  Constitutional:       General: He is not in acute distress.     Appearance: Normal appearance.   Cardiovascular:      Rate and Rhythm: Normal rate and regular rhythm.      Heart sounds: No murmur heard.  Pulmonary:      Breath sounds: Rhonchi present. No wheezing.   Skin:     General: Skin is warm.   Neurological:      Mental Status: He is alert. Mental status is at baseline.         Assessment/Plan   Problem List Items Addressed This Visit    None  Visit Diagnoses         Codes    Chronic bronchitis, unspecified chronic bronchitis type (Multi)    -  Primary J42    Relevant Medications    doxycycline (Monodox) 100 mg capsule    benzonatate (Tessalon) 100 mg capsule    Hyperkalemia     E87.5    Encounter for immunization     Z23    Relevant Orders    Flu vaccine, trivalent, preservative free, no egg protein, age 18y+ (Flublok) (Completed)        Medical supply order to [employer] for nebulizer tubing and masks.   Diet increase yogert, fluids brat diet if diarrhia   If worsening will take to ER

## 2024-10-20 ENCOUNTER — APPOINTMENT (OUTPATIENT)
Dept: RADIOLOGY | Facility: HOSPITAL | Age: 59
DRG: 193 | End: 2024-10-20
Payer: MEDICARE

## 2024-10-20 ENCOUNTER — HOSPITAL ENCOUNTER (INPATIENT)
Facility: HOSPITAL | Age: 59
DRG: 193 | End: 2024-10-20
Attending: STUDENT IN AN ORGANIZED HEALTH CARE EDUCATION/TRAINING PROGRAM | Admitting: STUDENT IN AN ORGANIZED HEALTH CARE EDUCATION/TRAINING PROGRAM
Payer: MEDICARE

## 2024-10-20 ENCOUNTER — APPOINTMENT (OUTPATIENT)
Dept: CARDIOLOGY | Facility: HOSPITAL | Age: 59
DRG: 193 | End: 2024-10-20
Payer: MEDICARE

## 2024-10-20 VITALS
DIASTOLIC BLOOD PRESSURE: 107 MMHG | WEIGHT: 132.72 LBS | HEIGHT: 60 IN | RESPIRATION RATE: 30 BRPM | SYSTOLIC BLOOD PRESSURE: 167 MMHG | OXYGEN SATURATION: 93 % | BODY MASS INDEX: 26.06 KG/M2 | HEART RATE: 70 BPM | TEMPERATURE: 97.9 F

## 2024-10-20 DIAGNOSIS — R09.02 HYPOXIA: ICD-10-CM

## 2024-10-20 DIAGNOSIS — J18.9 PNEUMONIA OF RIGHT LUNG DUE TO INFECTIOUS ORGANISM, UNSPECIFIED PART OF LUNG: Primary | ICD-10-CM

## 2024-10-20 DIAGNOSIS — F41.9 ANXIETY: ICD-10-CM

## 2024-10-20 LAB
ALBUMIN SERPL BCP-MCNC: 3.7 G/DL (ref 3.4–5)
ALP SERPL-CCNC: 79 U/L (ref 33–136)
ALT SERPL W P-5'-P-CCNC: 33 U/L (ref 10–52)
ANION GAP BLDV CALCULATED.4IONS-SCNC: 11 MMOL/L (ref 10–25)
ANION GAP SERPL CALCULATED.3IONS-SCNC: 12 MMOL/L (ref 10–20)
AST SERPL W P-5'-P-CCNC: 28 U/L (ref 9–39)
BASE EXCESS BLDV CALC-SCNC: 0 MMOL/L (ref -2–3)
BASOPHILS # BLD AUTO: 0.05 X10*3/UL (ref 0–0.1)
BASOPHILS NFR BLD AUTO: 0.4 %
BILIRUB SERPL-MCNC: 0.7 MG/DL (ref 0–1.2)
BNP SERPL-MCNC: 73 PG/ML (ref 0–99)
BODY TEMPERATURE: 37 DEGREES CELSIUS
BUN SERPL-MCNC: 22 MG/DL (ref 6–23)
CA-I BLDV-SCNC: 1.25 MMOL/L (ref 1.1–1.33)
CALCIUM SERPL-MCNC: 9.4 MG/DL (ref 8.6–10.3)
CARDIAC TROPONIN I PNL SERPL HS: 5 NG/L (ref 0–20)
CARDIAC TROPONIN I PNL SERPL HS: 6 NG/L (ref 0–20)
CHLORIDE BLDV-SCNC: 105 MMOL/L (ref 98–107)
CHLORIDE SERPL-SCNC: 105 MMOL/L (ref 98–107)
CO2 SERPL-SCNC: 25 MMOL/L (ref 21–32)
CREAT SERPL-MCNC: 1.66 MG/DL (ref 0.5–1.3)
EGFRCR SERPLBLD CKD-EPI 2021: 47 ML/MIN/1.73M*2
EOSINOPHIL # BLD AUTO: 0.02 X10*3/UL (ref 0–0.7)
EOSINOPHIL NFR BLD AUTO: 0.2 %
ERYTHROCYTE [DISTWIDTH] IN BLOOD BY AUTOMATED COUNT: 14.7 % (ref 11.5–14.5)
FLUAV RNA RESP QL NAA+PROBE: NOT DETECTED
FLUBV RNA RESP QL NAA+PROBE: NOT DETECTED
GLUCOSE BLDV-MCNC: 119 MG/DL (ref 74–99)
GLUCOSE SERPL-MCNC: 110 MG/DL (ref 74–99)
HCO3 BLDV-SCNC: 27.7 MMOL/L (ref 22–26)
HCT VFR BLD AUTO: 49.6 % (ref 41–52)
HCT VFR BLD EST: 51 % (ref 41–52)
HGB BLD-MCNC: 16.3 G/DL (ref 13.5–17.5)
HGB BLDV-MCNC: 16.9 G/DL (ref 13.5–17.5)
IMM GRANULOCYTES # BLD AUTO: 0.05 X10*3/UL (ref 0–0.7)
IMM GRANULOCYTES NFR BLD AUTO: 0.4 % (ref 0–0.9)
INHALED O2 CONCENTRATION: 0 %
LACTATE BLDV-SCNC: 2 MMOL/L (ref 0.4–2)
LACTATE BLDV-SCNC: 3.6 MMOL/L (ref 0.4–2)
LYMPHOCYTES # BLD AUTO: 0.3 X10*3/UL (ref 1.2–4.8)
LYMPHOCYTES NFR BLD AUTO: 2.5 %
MCH RBC QN AUTO: 30.5 PG (ref 26–34)
MCHC RBC AUTO-ENTMCNC: 32.9 G/DL (ref 32–36)
MCV RBC AUTO: 93 FL (ref 80–100)
MONOCYTES # BLD AUTO: 0.3 X10*3/UL (ref 0.1–1)
MONOCYTES NFR BLD AUTO: 2.5 %
NEUTROPHILS # BLD AUTO: 11.15 X10*3/UL (ref 1.2–7.7)
NEUTROPHILS NFR BLD AUTO: 94 %
NRBC BLD-RTO: 0 /100 WBCS (ref 0–0)
OXYHGB MFR BLDV: 18.8 % (ref 45–75)
PCO2 BLDV: 55 MM HG (ref 41–51)
PH BLDV: 7.31 PH (ref 7.33–7.43)
PLATELET # BLD AUTO: 186 X10*3/UL (ref 150–450)
PO2 BLDV: 23 MM HG (ref 35–45)
POTASSIUM BLDV-SCNC: 4.7 MMOL/L (ref 3.5–5.3)
POTASSIUM SERPL-SCNC: 4.3 MMOL/L (ref 3.5–5.3)
PROT SERPL-MCNC: 7.5 G/DL (ref 6.4–8.2)
RBC # BLD AUTO: 5.34 X10*6/UL (ref 4.5–5.9)
RSV RNA RESP QL NAA+PROBE: NOT DETECTED
SAO2 % BLDV: 19 % (ref 45–75)
SARS-COV-2 RNA RESP QL NAA+PROBE: NOT DETECTED
SODIUM BLDV-SCNC: 139 MMOL/L (ref 136–145)
SODIUM SERPL-SCNC: 138 MMOL/L (ref 136–145)
WBC # BLD AUTO: 11.9 X10*3/UL (ref 4.4–11.3)

## 2024-10-20 PROCEDURE — 87081 CULTURE SCREEN ONLY: CPT | Mod: TRILAB,WESLAB | Performed by: NURSE PRACTITIONER

## 2024-10-20 PROCEDURE — 36415 COLL VENOUS BLD VENIPUNCTURE: CPT | Performed by: STUDENT IN AN ORGANIZED HEALTH CARE EDUCATION/TRAINING PROGRAM

## 2024-10-20 PROCEDURE — 93005 ELECTROCARDIOGRAM TRACING: CPT

## 2024-10-20 PROCEDURE — 84145 PROCALCITONIN (PCT): CPT | Mod: TRILAB,WESLAB | Performed by: STUDENT IN AN ORGANIZED HEALTH CARE EDUCATION/TRAINING PROGRAM

## 2024-10-20 PROCEDURE — 96365 THER/PROPH/DIAG IV INF INIT: CPT | Mod: 59

## 2024-10-20 PROCEDURE — 84132 ASSAY OF SERUM POTASSIUM: CPT | Performed by: STUDENT IN AN ORGANIZED HEALTH CARE EDUCATION/TRAINING PROGRAM

## 2024-10-20 PROCEDURE — 2060000001 HC INTERMEDIATE ICU ROOM DAILY

## 2024-10-20 PROCEDURE — 84484 ASSAY OF TROPONIN QUANT: CPT | Performed by: STUDENT IN AN ORGANIZED HEALTH CARE EDUCATION/TRAINING PROGRAM

## 2024-10-20 PROCEDURE — 83880 ASSAY OF NATRIURETIC PEPTIDE: CPT | Performed by: STUDENT IN AN ORGANIZED HEALTH CARE EDUCATION/TRAINING PROGRAM

## 2024-10-20 PROCEDURE — 99232 SBSQ HOSP IP/OBS MODERATE 35: CPT | Performed by: NURSE PRACTITIONER

## 2024-10-20 PROCEDURE — 93010 ELECTROCARDIOGRAM REPORT: CPT | Performed by: INTERNAL MEDICINE

## 2024-10-20 PROCEDURE — 71045 X-RAY EXAM CHEST 1 VIEW: CPT

## 2024-10-20 PROCEDURE — 99285 EMERGENCY DEPT VISIT HI MDM: CPT | Mod: 25

## 2024-10-20 PROCEDURE — 83605 ASSAY OF LACTIC ACID: CPT | Performed by: STUDENT IN AN ORGANIZED HEALTH CARE EDUCATION/TRAINING PROGRAM

## 2024-10-20 PROCEDURE — 2500000001 HC RX 250 WO HCPCS SELF ADMINISTERED DRUGS (ALT 637 FOR MEDICARE OP): Performed by: STUDENT IN AN ORGANIZED HEALTH CARE EDUCATION/TRAINING PROGRAM

## 2024-10-20 PROCEDURE — 2500000004 HC RX 250 GENERAL PHARMACY W/ HCPCS (ALT 636 FOR OP/ED): Performed by: STUDENT IN AN ORGANIZED HEALTH CARE EDUCATION/TRAINING PROGRAM

## 2024-10-20 PROCEDURE — 99223 1ST HOSP IP/OBS HIGH 75: CPT | Performed by: STUDENT IN AN ORGANIZED HEALTH CARE EDUCATION/TRAINING PROGRAM

## 2024-10-20 PROCEDURE — 87637 SARSCOV2&INF A&B&RSV AMP PRB: CPT | Performed by: STUDENT IN AN ORGANIZED HEALTH CARE EDUCATION/TRAINING PROGRAM

## 2024-10-20 PROCEDURE — 99221 1ST HOSP IP/OBS SF/LOW 40: CPT | Performed by: STUDENT IN AN ORGANIZED HEALTH CARE EDUCATION/TRAINING PROGRAM

## 2024-10-20 PROCEDURE — 82330 ASSAY OF CALCIUM: CPT | Performed by: STUDENT IN AN ORGANIZED HEALTH CARE EDUCATION/TRAINING PROGRAM

## 2024-10-20 PROCEDURE — 96367 TX/PROPH/DG ADDL SEQ IV INF: CPT

## 2024-10-20 PROCEDURE — 2500000004 HC RX 250 GENERAL PHARMACY W/ HCPCS (ALT 636 FOR OP/ED): Performed by: NURSE PRACTITIONER

## 2024-10-20 PROCEDURE — 71045 X-RAY EXAM CHEST 1 VIEW: CPT | Performed by: RADIOLOGY

## 2024-10-20 PROCEDURE — 96366 THER/PROPH/DIAG IV INF ADDON: CPT

## 2024-10-20 PROCEDURE — 85025 COMPLETE CBC W/AUTO DIFF WBC: CPT | Performed by: STUDENT IN AN ORGANIZED HEALTH CARE EDUCATION/TRAINING PROGRAM

## 2024-10-20 PROCEDURE — 82435 ASSAY OF BLOOD CHLORIDE: CPT | Performed by: STUDENT IN AN ORGANIZED HEALTH CARE EDUCATION/TRAINING PROGRAM

## 2024-10-20 RX ORDER — SENNOSIDES 8.6 MG/1
2 TABLET ORAL 2 TIMES DAILY
Status: DISCONTINUED | OUTPATIENT
Start: 2024-10-20 | End: 2024-10-22 | Stop reason: HOSPADM

## 2024-10-20 RX ORDER — ACETAMINOPHEN 160 MG/5ML
650 SOLUTION ORAL EVERY 4 HOURS PRN
Status: DISCONTINUED | OUTPATIENT
Start: 2024-10-20 | End: 2024-10-20 | Stop reason: SDUPTHER

## 2024-10-20 RX ORDER — ACETAMINOPHEN 325 MG/1
650 TABLET ORAL EVERY 4 HOURS PRN
Status: DISCONTINUED | OUTPATIENT
Start: 2024-10-20 | End: 2024-10-22 | Stop reason: HOSPADM

## 2024-10-20 RX ORDER — ACETAMINOPHEN 650 MG/1
650 SUPPOSITORY RECTAL EVERY 4 HOURS PRN
Status: DISCONTINUED | OUTPATIENT
Start: 2024-10-20 | End: 2024-10-20 | Stop reason: SDUPTHER

## 2024-10-20 RX ORDER — CEFTRIAXONE 2 G/50ML
2 INJECTION, SOLUTION INTRAVENOUS EVERY 24 HOURS
Status: DISCONTINUED | OUTPATIENT
Start: 2024-10-21 | End: 2024-10-20

## 2024-10-20 RX ORDER — FAMOTIDINE 20 MG/1
20 TABLET, FILM COATED ORAL 2 TIMES DAILY
Status: DISCONTINUED | OUTPATIENT
Start: 2024-10-20 | End: 2024-10-22 | Stop reason: HOSPADM

## 2024-10-20 RX ORDER — HEPARIN SODIUM 5000 [USP'U]/ML
5000 INJECTION, SOLUTION INTRAVENOUS; SUBCUTANEOUS EVERY 12 HOURS
Status: DISCONTINUED | OUTPATIENT
Start: 2024-10-20 | End: 2024-10-22 | Stop reason: HOSPADM

## 2024-10-20 RX ORDER — SODIUM CHLORIDE 9 MG/ML
125 INJECTION, SOLUTION INTRAVENOUS CONTINUOUS
Status: DISCONTINUED | OUTPATIENT
Start: 2024-10-20 | End: 2024-10-21

## 2024-10-20 RX ORDER — LEVOTHYROXINE SODIUM 112 UG/1
112 TABLET ORAL DAILY
Status: DISCONTINUED | OUTPATIENT
Start: 2024-10-20 | End: 2024-10-22 | Stop reason: HOSPADM

## 2024-10-20 RX ORDER — ATORVASTATIN CALCIUM 20 MG/1
20 TABLET, FILM COATED ORAL NIGHTLY
Status: DISCONTINUED | OUTPATIENT
Start: 2024-10-20 | End: 2024-10-22 | Stop reason: HOSPADM

## 2024-10-20 RX ORDER — CEFTRIAXONE 2 G/50ML
2 INJECTION, SOLUTION INTRAVENOUS ONCE
Status: COMPLETED | OUTPATIENT
Start: 2024-10-20 | End: 2024-10-20

## 2024-10-20 RX ORDER — ACETAMINOPHEN 160 MG/5ML
650 SOLUTION ORAL EVERY 4 HOURS PRN
Status: DISCONTINUED | OUTPATIENT
Start: 2024-10-20 | End: 2024-10-22 | Stop reason: HOSPADM

## 2024-10-20 RX ORDER — HYDROXYZINE HYDROCHLORIDE 25 MG/1
100 TABLET, FILM COATED ORAL EVERY 6 HOURS PRN
Status: DISCONTINUED | OUTPATIENT
Start: 2024-10-20 | End: 2024-10-22 | Stop reason: HOSPADM

## 2024-10-20 RX ORDER — ACETAMINOPHEN 325 MG/1
650 TABLET ORAL EVERY 4 HOURS PRN
Status: DISCONTINUED | OUTPATIENT
Start: 2024-10-20 | End: 2024-10-20 | Stop reason: SDUPTHER

## 2024-10-20 RX ORDER — ACETAMINOPHEN 650 MG/1
650 SUPPOSITORY RECTAL EVERY 4 HOURS PRN
Status: DISCONTINUED | OUTPATIENT
Start: 2024-10-20 | End: 2024-10-22 | Stop reason: HOSPADM

## 2024-10-20 RX ORDER — POLYETHYLENE GLYCOL 3350 17 G/17G
17 POWDER, FOR SOLUTION ORAL DAILY
Status: DISCONTINUED | OUTPATIENT
Start: 2024-10-20 | End: 2024-10-22 | Stop reason: HOSPADM

## 2024-10-20 RX ADMIN — FAMOTIDINE 20 MG: 20 TABLET ORAL at 21:40

## 2024-10-20 RX ADMIN — DEXTROSE MONOHYDRATE 500 MG: 50 INJECTION, SOLUTION INTRAVENOUS at 02:01

## 2024-10-20 RX ADMIN — SODIUM CHLORIDE 500 ML: 900 INJECTION, SOLUTION INTRAVENOUS at 10:31

## 2024-10-20 RX ADMIN — ATORVASTATIN CALCIUM 20 MG: 20 TABLET, FILM COATED ORAL at 21:40

## 2024-10-20 RX ADMIN — CEFTRIAXONE SODIUM 2 G: 2 INJECTION, SOLUTION INTRAVENOUS at 01:38

## 2024-10-20 RX ADMIN — LEVOTHYROXINE SODIUM 112 MCG: 0.11 TABLET ORAL at 07:07

## 2024-10-20 RX ADMIN — HEPARIN SODIUM 5000 UNITS: 5000 INJECTION, SOLUTION INTRAVENOUS; SUBCUTANEOUS at 21:40

## 2024-10-20 RX ADMIN — SODIUM CHLORIDE 500 ML: 900 INJECTION, SOLUTION INTRAVENOUS at 12:40

## 2024-10-20 RX ADMIN — PIPERACILLIN SODIUM AND TAZOBACTAM SODIUM 2.25 G: 2; .25 INJECTION, SOLUTION INTRAVENOUS at 12:14

## 2024-10-20 RX ADMIN — HYDROXYZINE HYDROCHLORIDE 100 MG: 25 TABLET, FILM COATED ORAL at 21:40

## 2024-10-20 RX ADMIN — POLYETHYLENE GLYCOL 3350 17 G: 17 POWDER, FOR SOLUTION ORAL at 10:01

## 2024-10-20 RX ADMIN — SODIUM CHLORIDE 75 ML/HR: 900 INJECTION, SOLUTION INTRAVENOUS at 10:13

## 2024-10-20 RX ADMIN — SENNOSIDES 17.2 MG: 8.6 TABLET, FILM COATED ORAL at 10:01

## 2024-10-20 RX ADMIN — PIPERACILLIN SODIUM AND TAZOBACTAM SODIUM 2.25 G: 2; .25 INJECTION, SOLUTION INTRAVENOUS at 17:54

## 2024-10-20 RX ADMIN — SENNOSIDES 17.2 MG: 8.6 TABLET, FILM COATED ORAL at 21:40

## 2024-10-20 RX ADMIN — HEPARIN SODIUM 5000 UNITS: 5000 INJECTION, SOLUTION INTRAVENOUS; SUBCUTANEOUS at 10:01

## 2024-10-20 RX ADMIN — FAMOTIDINE 20 MG: 20 TABLET ORAL at 10:01

## 2024-10-20 SDOH — SOCIAL STABILITY: SOCIAL NETWORK: HOW OFTEN DO YOU GET TOGETHER WITH FRIENDS OR RELATIVES?: MORE THAN THREE TIMES A WEEK

## 2024-10-20 SDOH — ECONOMIC STABILITY: TRANSPORTATION INSECURITY: IN THE PAST 12 MONTHS, HAS LACK OF TRANSPORTATION KEPT YOU FROM MEDICAL APPOINTMENTS OR FROM GETTING MEDICATIONS?: NO

## 2024-10-20 SDOH — SOCIAL STABILITY: SOCIAL NETWORK
IN A TYPICAL WEEK, HOW MANY TIMES DO YOU TALK ON THE PHONE WITH FAMILY, FRIENDS, OR NEIGHBORS?: MORE THAN THREE TIMES A WEEK

## 2024-10-20 SDOH — ECONOMIC STABILITY: HOUSING INSECURITY: AT ANY TIME IN THE PAST 12 MONTHS, WERE YOU HOMELESS OR LIVING IN A SHELTER (INCLUDING NOW)?: NO

## 2024-10-20 SDOH — SOCIAL STABILITY: SOCIAL INSECURITY: ARE THERE ANY APPARENT SIGNS OF INJURIES/BEHAVIORS THAT COULD BE RELATED TO ABUSE/NEGLECT?: NO

## 2024-10-20 SDOH — ECONOMIC STABILITY: FOOD INSECURITY: WITHIN THE PAST 12 MONTHS, THE FOOD YOU BOUGHT JUST DIDN'T LAST AND YOU DIDN'T HAVE MONEY TO GET MORE.: NEVER TRUE

## 2024-10-20 SDOH — HEALTH STABILITY: MENTAL HEALTH: HOW OFTEN DO YOU HAVE A DRINK CONTAINING ALCOHOL?: NEVER

## 2024-10-20 SDOH — SOCIAL STABILITY: SOCIAL INSECURITY: ARE YOU OR HAVE YOU BEEN THREATENED OR ABUSED PHYSICALLY, EMOTIONALLY, OR SEXUALLY BY ANYONE?: UNABLE TO ASSESS

## 2024-10-20 SDOH — HEALTH STABILITY: MENTAL HEALTH: HOW MANY DRINKS CONTAINING ALCOHOL DO YOU HAVE ON A TYPICAL DAY WHEN YOU ARE DRINKING?: PATIENT DOES NOT DRINK

## 2024-10-20 SDOH — HEALTH STABILITY: MENTAL HEALTH: HOW OFTEN DO YOU HAVE SIX OR MORE DRINKS ON ONE OCCASION?: NEVER

## 2024-10-20 SDOH — SOCIAL STABILITY: SOCIAL NETWORK
DO YOU BELONG TO ANY CLUBS OR ORGANIZATIONS SUCH AS CHURCH GROUPS, UNIONS, FRATERNAL OR ATHLETIC GROUPS, OR SCHOOL GROUPS?: NO

## 2024-10-20 SDOH — SOCIAL STABILITY: SOCIAL INSECURITY: HAVE YOU HAD ANY THOUGHTS OF HARMING ANYONE ELSE?: UNABLE TO ASSESS

## 2024-10-20 SDOH — HEALTH STABILITY: MENTAL HEALTH
DO YOU FEEL STRESS - TENSE, RESTLESS, NERVOUS, OR ANXIOUS, OR UNABLE TO SLEEP AT NIGHT BECAUSE YOUR MIND IS TROUBLED ALL THE TIME - THESE DAYS?: PATIENT UNABLE TO ANSWER

## 2024-10-20 SDOH — ECONOMIC STABILITY: HOUSING INSECURITY: IN THE LAST 12 MONTHS, WAS THERE A TIME WHEN YOU WERE NOT ABLE TO PAY THE MORTGAGE OR RENT ON TIME?: NO

## 2024-10-20 SDOH — SOCIAL STABILITY: SOCIAL INSECURITY
WITHIN THE LAST YEAR, HAVE YOU BEEN KICKED, HIT, SLAPPED, OR OTHERWISE PHYSICALLY HURT BY YOUR PARTNER OR EX-PARTNER?: NO

## 2024-10-20 SDOH — SOCIAL STABILITY: SOCIAL INSECURITY: DO YOU FEEL ANYONE HAS EXPLOITED OR TAKEN ADVANTAGE OF YOU FINANCIALLY OR OF YOUR PERSONAL PROPERTY?: UNABLE TO ASSESS

## 2024-10-20 SDOH — SOCIAL STABILITY: SOCIAL INSECURITY: HAS ANYONE EVER THREATENED TO HURT YOUR FAMILY OR YOUR PETS?: UNABLE TO ASSESS

## 2024-10-20 SDOH — SOCIAL STABILITY: SOCIAL INSECURITY: DOES ANYONE TRY TO KEEP YOU FROM HAVING/CONTACTING OTHER FRIENDS OR DOING THINGS OUTSIDE YOUR HOME?: UNABLE TO ASSESS

## 2024-10-20 SDOH — SOCIAL STABILITY: SOCIAL INSECURITY: WITHIN THE LAST YEAR, HAVE YOU BEEN HUMILIATED OR EMOTIONALLY ABUSED IN OTHER WAYS BY YOUR PARTNER OR EX-PARTNER?: NO

## 2024-10-20 SDOH — SOCIAL STABILITY: SOCIAL INSECURITY
WITHIN THE LAST YEAR, HAVE YOU BEEN RAPED OR FORCED TO HAVE ANY KIND OF SEXUAL ACTIVITY BY YOUR PARTNER OR EX-PARTNER?: NO

## 2024-10-20 SDOH — ECONOMIC STABILITY: INCOME INSECURITY: IN THE PAST 12 MONTHS HAS THE ELECTRIC, GAS, OIL, OR WATER COMPANY THREATENED TO SHUT OFF SERVICES IN YOUR HOME?: NO

## 2024-10-20 SDOH — SOCIAL STABILITY: SOCIAL NETWORK: HOW OFTEN DO YOU ATTEND MEETINGS OF THE CLUBS OR ORGANIZATIONS YOU BELONG TO?: NEVER

## 2024-10-20 SDOH — ECONOMIC STABILITY: HOUSING INSECURITY: IN THE PAST 12 MONTHS, HOW MANY TIMES HAVE YOU MOVED WHERE YOU WERE LIVING?: 0

## 2024-10-20 SDOH — SOCIAL STABILITY: SOCIAL INSECURITY: DO YOU FEEL UNSAFE GOING BACK TO THE PLACE WHERE YOU ARE LIVING?: UNABLE TO ASSESS

## 2024-10-20 SDOH — ECONOMIC STABILITY: FOOD INSECURITY: WITHIN THE PAST 12 MONTHS, YOU WORRIED THAT YOUR FOOD WOULD RUN OUT BEFORE YOU GOT THE MONEY TO BUY MORE.: NEVER TRUE

## 2024-10-20 SDOH — HEALTH STABILITY: MENTAL HEALTH: EXPERIENCED ANY OF THE FOLLOWING LIFE EVENTS: OTHER (COMMENT)

## 2024-10-20 SDOH — SOCIAL STABILITY: SOCIAL NETWORK: HOW OFTEN DO YOU ATTEND CHURCH OR RELIGIOUS SERVICES?: NEVER

## 2024-10-20 SDOH — SOCIAL STABILITY: SOCIAL INSECURITY: ABUSE: ADULT

## 2024-10-20 SDOH — HEALTH STABILITY: PHYSICAL HEALTH: ON AVERAGE, HOW MANY MINUTES DO YOU ENGAGE IN EXERCISE AT THIS LEVEL?: 0 MIN

## 2024-10-20 SDOH — ECONOMIC STABILITY: FOOD INSECURITY: HOW HARD IS IT FOR YOU TO PAY FOR THE VERY BASICS LIKE FOOD, HOUSING, MEDICAL CARE, AND HEATING?: NOT HARD AT ALL

## 2024-10-20 SDOH — HEALTH STABILITY: PHYSICAL HEALTH
HOW OFTEN DO YOU NEED TO HAVE SOMEONE HELP YOU WHEN YOU READ INSTRUCTIONS, PAMPHLETS, OR OTHER WRITTEN MATERIAL FROM YOUR DOCTOR OR PHARMACY?: ALWAYS

## 2024-10-20 SDOH — SOCIAL STABILITY: SOCIAL INSECURITY: WITHIN THE LAST YEAR, HAVE YOU BEEN AFRAID OF YOUR PARTNER OR EX-PARTNER?: NO

## 2024-10-20 SDOH — SOCIAL STABILITY: SOCIAL INSECURITY: HAVE YOU HAD THOUGHTS OF HARMING ANYONE ELSE?: NO

## 2024-10-20 SDOH — SOCIAL STABILITY: SOCIAL INSECURITY: ARE YOU MARRIED, WIDOWED, DIVORCED, SEPARATED, NEVER MARRIED, OR LIVING WITH A PARTNER?: PATIENT UNABLE TO ANSWER

## 2024-10-20 SDOH — HEALTH STABILITY: PHYSICAL HEALTH: ON AVERAGE, HOW MANY DAYS PER WEEK DO YOU ENGAGE IN MODERATE TO STRENUOUS EXERCISE (LIKE A BRISK WALK)?: 0 DAYS

## 2024-10-20 SDOH — SOCIAL STABILITY: SOCIAL INSECURITY: POSSIBLE ABUSE REPORTED TO:: OTHER (COMMENT)

## 2024-10-20 SDOH — SOCIAL STABILITY: SOCIAL INSECURITY: WERE YOU ABLE TO COMPLETE ALL THE BEHAVIORAL HEALTH SCREENINGS?: YES

## 2024-10-20 ASSESSMENT — COGNITIVE AND FUNCTIONAL STATUS - GENERAL
MOVING TO AND FROM BED TO CHAIR: A LOT
CLIMB 3 TO 5 STEPS WITH RAILING: A LOT
WALKING IN HOSPITAL ROOM: A LOT
DRESSING REGULAR UPPER BODY CLOTHING: TOTAL
MOVING TO AND FROM BED TO CHAIR: A LOT
MOBILITY SCORE: 12
STANDING UP FROM CHAIR USING ARMS: A LOT
HELP NEEDED FOR BATHING: TOTAL
PERSONAL GROOMING: TOTAL
STANDING UP FROM CHAIR USING ARMS: A LOT
TURNING FROM BACK TO SIDE WHILE IN FLAT BAD: A LOT
TOILETING: TOTAL
DRESSING REGULAR LOWER BODY CLOTHING: TOTAL
DRESSING REGULAR LOWER BODY CLOTHING: TOTAL
TOILETING: TOTAL
CLIMB 3 TO 5 STEPS WITH RAILING: A LOT
EATING MEALS: TOTAL
DAILY ACTIVITIY SCORE: 6
MOVING FROM LYING ON BACK TO SITTING ON SIDE OF FLAT BED WITH BEDRAILS: A LOT
TURNING FROM BACK TO SIDE WHILE IN FLAT BAD: A LOT
DAILY ACTIVITIY SCORE: 6
PERSONAL GROOMING: TOTAL
HELP NEEDED FOR BATHING: TOTAL
MOBILITY SCORE: 12
WALKING IN HOSPITAL ROOM: A LOT
DRESSING REGULAR UPPER BODY CLOTHING: TOTAL
EATING MEALS: TOTAL
MOVING FROM LYING ON BACK TO SITTING ON SIDE OF FLAT BED WITH BEDRAILS: A LOT
PATIENT BASELINE BEDBOUND: NO

## 2024-10-20 ASSESSMENT — PAIN SCALES - GENERAL
PAINLEVEL_OUTOF10: 0 - NO PAIN

## 2024-10-20 ASSESSMENT — PAIN - FUNCTIONAL ASSESSMENT
PAIN_FUNCTIONAL_ASSESSMENT: 0-10
PAIN_FUNCTIONAL_ASSESSMENT: 0-10
PAIN_FUNCTIONAL_ASSESSMENT: UNABLE TO SELF-REPORT

## 2024-10-20 ASSESSMENT — LIFESTYLE VARIABLES
HOW OFTEN DO YOU HAVE 6 OR MORE DRINKS ON ONE OCCASION: NEVER
SKIP TO QUESTIONS 9-10: 1
SKIP TO QUESTIONS 9-10: 1
AUDIT-C TOTAL SCORE: 0
AUDIT-C TOTAL SCORE: 0
HOW OFTEN DO YOU HAVE A DRINK CONTAINING ALCOHOL: NEVER
HOW MANY STANDARD DRINKS CONTAINING ALCOHOL DO YOU HAVE ON A TYPICAL DAY: PATIENT DOES NOT DRINK
AUDIT-C TOTAL SCORE: 0
SUBSTANCE_ABUSE_PAST_12_MONTHS: NO
PRESCIPTION_ABUSE_PAST_12_MONTHS: NO

## 2024-10-20 ASSESSMENT — PAIN SCALES - WONG BAKER: WONGBAKER_NUMERICALRESPONSE: NO HURT

## 2024-10-20 ASSESSMENT — ACTIVITIES OF DAILY LIVING (ADL)
LACK_OF_TRANSPORTATION: NO
HEARING - RIGHT EAR: FUNCTIONAL
JUDGMENT_ADEQUATE_SAFELY_COMPLETE_DAILY_ACTIVITIES: NO
GROOMING: NEEDS ASSISTANCE
BATHING: NEEDS ASSISTANCE
ADEQUATE_TO_COMPLETE_ADL: NO
HEARING - LEFT EAR: FUNCTIONAL
TOILETING: NEEDS ASSISTANCE
WALKS IN HOME: DEPENDENT
FEEDING YOURSELF: NEEDS ASSISTANCE
DRESSING YOURSELF: NEEDS ASSISTANCE
PATIENT'S MEMORY ADEQUATE TO SAFELY COMPLETE DAILY ACTIVITIES?: NO

## 2024-10-20 ASSESSMENT — PATIENT HEALTH QUESTIONNAIRE - PHQ9
1. LITTLE INTEREST OR PLEASURE IN DOING THINGS: NOT AT ALL
SUM OF ALL RESPONSES TO PHQ9 QUESTIONS 1 & 2: 0
2. FEELING DOWN, DEPRESSED OR HOPELESS: NOT AT ALL

## 2024-10-20 NOTE — NURSING NOTE
Spoke with hospitalist Dr Green in regards to patients level of care; pt MRDD, nonverbal, new oxygen requirement need, unable to make needs known, abnormal ABGs and high fall risk; verified if level of care should remain acute or be intermediate given his history, dx and safety requirements of nursing staff to be close to the patient with frequent checks. Dr Green stated patient should be intermediate/stepdown level of care. Order changed at this time via verbal order read back. Order repeated and verified; order confirmed to make level of care intermediate/stepdown.

## 2024-10-20 NOTE — H&P
History Of Present Illness  Elliott Hernandez is a 59 y.o. male known past medical history of Down Syndrome, Non verbal at baseline, enlarged tongue, HTN, recent cardiac pacemaker, presenting with complains of SOB. Shortness of breath from his care home. Patient is nonverbal at baseline. Report received from EMS is that the patient has been having increased work of breathing for most of the day, this evening got worse around 8 PM, and that is why they were called. They found him hypoxic at around 78 on room air, started him on nonrebreather and transported him to the emergency department. I had a discussion with the sister (HCP) at the bedside, she mentioned care home people mentioned sob. She denied fever or cough or recent sick contacts.     Ed workup revealed Right sided airspace disease. Patient was started on NC and given abx. Patient will be admitted under hospital medicine for the management of PNA.     Past Medical History  He has a past medical history of Hypoxemia (04/16/2015).    Surgical History  He has no past surgical history on file.     Social History  He reports that he has never smoked. He has never used smokeless tobacco. He reports that he does not drink alcohol and does not use drugs.    Family History  Family History   Problem Relation Name Age of Onset    No Known Problems Mother      No Known Problems Father          Allergies  Ascorbic acid, Inhaled anesthetics (halogen based), Lactase, Peas, Sulfa (sulfonamide antibiotics), Chocolate hazelnut flavor, and Orange juice    Review of Systems   Unable to perform ROS: Patient nonverbal        Physical Exam  Vitals reviewed: exam is difficult as he cannot follow commands.   Constitutional:       Appearance: Normal appearance.   HENT:      Head: Normocephalic and atraumatic.      Nose: Nose normal.      Mouth/Throat:      Mouth: Mucous membranes are moist.      Pharynx: Oropharynx is clear.   Eyes:      Conjunctiva/sclera: Conjunctivae normal.    Cardiovascular:      Rate and Rhythm: Normal rate and regular rhythm.      Pulses: Normal pulses.      Heart sounds: Normal heart sounds.   Pulmonary:      Effort: Pulmonary effort is normal.      Comments: On Nc  Decreased b/l breath sounds  Abdominal:      General: Abdomen is flat. Bowel sounds are normal.   Musculoskeletal:         General: Normal range of motion.      Cervical back: Normal range of motion and neck supple.      Right lower leg: No edema.      Left lower leg: No edema.   Skin:     General: Skin is warm.      Capillary Refill: Capillary refill takes less than 2 seconds.   Neurological:      Mental Status: He is alert. Mental status is at baseline.   Psychiatric:         Mood and Affect: Mood normal.         Behavior: Behavior normal.          Last Recorded Vitals  BP (!) 154/97 (BP Location: Left arm, Patient Position: Sitting)   Pulse 65   Temp 36.9 °C (98.4 °F) (Temporal)   Resp (!) 22   Wt 59 kg (130 lb)   SpO2 96%     Relevant Results             Assessment/Plan   Assessment & Plan  Pneumonia of right lung due to infectious organism, unspecified part of lung  - SOB, requiring NC  - Wean oxygen  - Start Ceftriaxone and Azithromycin  - Procal  - Urine antigens  - PT/OT  Essential (primary) hypertension  - Did not find any medication in the nursing care documents  - Monitor for now  Chronic GERD  - Home medication famotidine   Anxiety  - Atarax as needed  Acquired hypothyroidism  - Continue levothyroxine    DVT: Heparin due to Cr Cl  Disposition: Pending improvement in oxygen, will likely dc in 48 hours.         Melquiades Green MD

## 2024-10-20 NOTE — CONSULTS
Pulmonary Consultation Note   Subjective    Reason for Consult: pneumonia    History of Present Illness:  Cyril Hernandez is a 59 y.o. year old male patient admitted on 10/20/2024 with hypoxia. Pt is non-verbal, lives in group home. Was noted to be sob, EMS was called and he was hypoxic with SpO2 in the 70s. In ED initially required 5-7L NC. CXR showed RML infiltrate. He was started on broad spectrum abx and admitted. According to his sister who was with him at the time of my visit he was recently treated with antibiotics - she thinks azithromycin for cough. He initially improved and then worsened again. When I saw him today he had been weaned to room air and was maintaining saturations in the low 90s.     Past Medical History  He has a past medical history of Hypoxemia (04/16/2015).    Surgical History  He has no past surgical history on file.     Social History  Social History     Tobacco Use    Smoking status: Never    Smokeless tobacco: Never   Vaping Use    Vaping status: Never Used   Substance Use Topics    Alcohol use: Never    Drug use: Never       Family History  Family History   Problem Relation Name Age of Onset    No Known Problems Mother      No Known Problems Father          Allergies  Inhaled anesthetics (halogen based), Peas, Sulfa (sulfonamide antibiotics), and Chocolate hazelnut flavor    Review of Systems: Recent cough    Meds    Scheduled medications  atorvastatin, 20 mg, oral, Nightly  [START ON 10/21/2024] azithromycin, 500 mg, intravenous, q24h  famotidine, 20 mg, oral, BID  heparin, 5,000 Units, subcutaneous, q12h  levothyroxine, 112 mcg, oral, Daily  piperacillin-tazobactam, 2.25 g, intravenous, q6h  polyethylene glycol, 17 g, oral, Daily  sennosides, 2 tablet, oral, BID      Continuous medications  sodium chloride 0.9%, 125 mL/hr, Last Rate: 125 mL/hr (10/20/24 1239)      PRN medications  PRN medications: acetaminophen **OR** acetaminophen **OR** acetaminophen, hydrOXYzine HCL, oxygen  "    Objective    Blood pressure 71/55, pulse 63, temperature 36.8 °C (98.2 °F), temperature source Temporal, resp. rate 12, height 1.499 m (4' 11\"), weight 60.2 kg (132 lb 11.5 oz), SpO2 97%.   Physical Exam   GENERAL: normal appearance. well nourished. No respiratory distress  HEAD/SINUSES: no sinus tenderness  OROPHARYNX: Moist mucosa, no thrush or lesions, macroglossia  NECK: no JVD, midline trachea without stridor. Thyroid not enlarged  LYMPH NODES : none felt in the cervical, submandibular or supraclavicular regions  LUNGS: Symmetric chest. Good excursion. Diminished breath sounds on the right.   CARDIAC: normal S1 and S2; no gallops, rubs or murmurs. Regular rate and rhythm  EXTREMITIES: No edema, no varicose veins  NEURO: grossly normal mental status, CN reflexes and motor strength.   SKIN: Skin turgor normal. No rashes or lesions.   PSYCH: Normal affect    Intake/Output Summary (Last 24 hours) at 10/20/2024 1501  Last data filed at 10/20/2024 1310  Gross per 24 hour   Intake 1049 ml   Output --   Net 1049 ml     Labs:   Results from last 72 hours   Lab Units 10/20/24  0103   SODIUM mmol/L 138   POTASSIUM mmol/L 4.3   CHLORIDE mmol/L 105   CO2 mmol/L 25   BUN mg/dL 22   CREATININE mg/dL 1.66*   GLUCOSE mg/dL 110*   CALCIUM mg/dL 9.4   ANION GAP mmol/L 12   EGFR mL/min/1.73m*2 47*      Results from last 72 hours   Lab Units 10/20/24  0103   WBC AUTO x10*3/uL 11.9*   HEMOGLOBIN g/dL 16.3   HEMATOCRIT % 49.6   PLATELETS AUTO x10*3/uL 186   NEUTROS PCT AUTO % 94.0   LYMPHS PCT AUTO % 2.5   MONOS PCT AUTO % 2.5   EOS PCT AUTO % 0.2      Micro/ID:   Lab Results   Component Value Date    URINECULTURE No significant growth 08/27/2024    BLOODCULT No growth at 4 days -  FINAL REPORT 07/15/2024    BLOODCULT No growth at 4 days -  FINAL REPORT 07/15/2024     Summary of key imaging results from the last 24 hours  RML infiltrate    Impression   Cyrli OSEGUERA Brianradhaelreece is a 59 y.o. year old male patient is being seen by the " pulmonary service for   Pneumonia  Downs Syndrome      Recommendations   As follows:  Significantly improved from admission - now weaned to room air  Antitbiotics per ID  Recommend overnight pulse oximetry study immediately prior to discharge or as outpatient given high risk for obstructive sleep apnea in patient's with Downs Syndrome. (Sister did not think he would tolerate full sleep study or CPAP if diagnosed so I would not put him through that).    Vannessa Grande MD PhD   10/20/24 at 3:01 PM     Disclaimer: Documentation completed with the information available at the time of input. Parts of this note may have been scribed or generated using voice dictation software, Dragon.  Homophonic errors may exist.  Please contact me directly if clarification is needed. The times in the chart may not be reflective of actual patient care times, interventions, or procedures. Documentation occurs after the physical care of the patient.

## 2024-10-20 NOTE — PROGRESS NOTES
Therapy Communication Note    Patient Name: Cyril Hernandez  MRN: [id number]  Today's Date: 10/20/2024    Discipline: Physical Therapy    Missed Visit Reason:      Missed Time: Cancel    Comment: RN reports BP in the 70s this morning requesting therapy cancel for AM. Bolus to be given.    No PLOF info available in EMR or hard chart. Per RN, report was given that Pt is ambulatory at baseline.    *UPDATE* 1213: Discussed case with RN. BP continues to run low 71/39. Cancel PT eval for today. Family in room and able to provide some PLOF/Home info. Pt lives in a group home where normally he is ind with bed mobility, transfers, and ambulation with no assistive device. Has not fallen in over a year. Complete assist from staff for all ADLs/IADLs but supervision feeding.

## 2024-10-20 NOTE — ASSESSMENT & PLAN NOTE
- Continue levothyroxine    DVT: Heparin due to Cr Cl  Disposition: Pending improvement in oxygen, will likely dc in 48 hours.

## 2024-10-20 NOTE — PROGRESS NOTES
"Cyril Hernandez \"Briana" is a 59 y.o. male on day 0 of admission presenting with Pneumonia of right lung due to infectious organism, unspecified part of lung.      Subjective   Patient seen and examined. Resting in bed. He wakes up to name, he is nonverbal, appears fatigued. Afebrile.        Objective     Last Recorded Vitals  BP 88/56 (BP Location: Left arm, Patient Position: Lying)   Pulse 65   Temp 37.3 °C (99.1 °F) (Temporal)   Resp 16   Wt 60.2 kg (132 lb 11.5 oz)   SpO2 91%   Intake/Output last 3 Shifts:  No intake or output data in the 24 hours ending 10/20/24 1002    Admission Weight  Weight: 59 kg (130 lb) (10/20/24 0029)    Daily Weight  10/20/24 : 60.2 kg (132 lb 11.5 oz)    Image Results  XR chest 1 view  Narrative: Interpreted By:  Lyn Rivera,   STUDY:  XR CHEST 1 VIEW;  10/20/2024 1:13 am      INDICATION:  Signs/Symptoms:SOB.      COMPARISON:  07/15/2024      ACCESSION NUMBER(S):  [id number]      ORDERING CLINICIAN:  SHANEL BAKER      FINDINGS:          CARDIOMEDIASTINAL SILHOUETTE:  Cardiomediastinal silhouette is normal in size and configuration.  Pacemaker leads overlie the right atrium and right ventricle.      LUNGS:  There is a consolidation at the right lung base. No significant  pleural effusion. No pneumothorax.      ABDOMEN:  Gaseous distention of the stomach.      BONES:  No acute osseous abnormality.      Impression: Right basilar airspace disease, suspicious for pneumonia.  Radiographic follow-up to complete resolution is recommended.      Gastric distention.      MACRO:  None      Signed by: Lyn Rivera 10/20/2024 2:05 AM  Dictation workstation:   [id number]      Physical Exam    General: Fatigued, awakens to name, nonverbal.   Cardiac: Regular rate and rhythm, S1/S2 , no murmur.   Pulmonary: Clear/Diminsihed on 5L NC.   Abdomen: Soft, mild distension, nontender. BS +x4.   Extremities: No edema.  Skin: No rashes or lesions.      Relevant Results    Scheduled " medications  atorvastatin, 20 mg, oral, Nightly  [START ON 10/21/2024] azithromycin, 500 mg, intravenous, q24h  [START ON 10/21/2024] cefTRIAXone, 2 g, intravenous, q24h  famotidine, 20 mg, oral, BID  heparin, 5,000 Units, subcutaneous, q12h  levothyroxine, 112 mcg, oral, Daily  polyethylene glycol, 17 g, oral, Daily  sennosides, 2 tablet, oral, BID      Continuous medications  sodium chloride 0.9%, 75 mL/hr      PRN medications  PRN medications: acetaminophen **OR** acetaminophen **OR** acetaminophen, hydrOXYzine HCL, oxygen     Results for orders placed or performed during the hospital encounter of 10/20/24 (from the past 24 hours)   Blood Gas Venous Full Panel   Result Value Ref Range    POCT pH, Venous 7.31 (L) 7.33 - 7.43 pH    POCT pCO2, Venous 55 (H) 41 - 51 mm Hg    POCT pO2, Venous 23 (L) 35 - 45 mm Hg    POCT SO2, Venous 19 (L) 45 - 75 %    POCT Oxy Hemoglobin, Venous 18.8 (L) 45.0 - 75.0 %    POCT Hematocrit Calculated, Venous 51.0 41.0 - 52.0 %    POCT Sodium, Venous 139 136 - 145 mmol/L    POCT Potassium, Venous 4.7 3.5 - 5.3 mmol/L    POCT Chloride, Venous 105 98 - 107 mmol/L    POCT Ionized Calicum, Venous 1.25 1.10 - 1.33 mmol/L    POCT Glucose, Venous 119 (H) 74 - 99 mg/dL    POCT Lactate, Venous 3.6 (H) 0.4 - 2.0 mmol/L    POCT Base Excess, Venous 0.0 -2.0 - 3.0 mmol/L    POCT HCO3 Calculated, Venous 27.7 (H) 22.0 - 26.0 mmol/L    POCT Hemoglobin, Venous 16.9 13.5 - 17.5 g/dL    POCT Anion Gap, Venous 11.0 10.0 - 25.0 mmol/L    Patient Temperature 37.0 degrees Celsius    FiO2 0 %   Comprehensive Metabolic Panel   Result Value Ref Range    Glucose 110 (H) 74 - 99 mg/dL    Sodium 138 136 - 145 mmol/L    Potassium 4.3 3.5 - 5.3 mmol/L    Chloride 105 98 - 107 mmol/L    Bicarbonate 25 21 - 32 mmol/L    Anion Gap 12 10 - 20 mmol/L    Urea Nitrogen 22 6 - 23 mg/dL    Creatinine 1.66 (H) 0.50 - 1.30 mg/dL    eGFR 47 (L) >60 mL/min/1.73m*2    Calcium 9.4 8.6 - 10.3 mg/dL    Albumin 3.7 3.4 - 5.0 g/dL     Alkaline Phosphatase 79 33 - 136 U/L    Total Protein 7.5 6.4 - 8.2 g/dL    AST 28 9 - 39 U/L    Bilirubin, Total 0.7 0.0 - 1.2 mg/dL    ALT 33 10 - 52 U/L   CBC and Auto Differential   Result Value Ref Range    WBC 11.9 (H) 4.4 - 11.3 x10*3/uL    nRBC 0.0 0.0 - 0.0 /100 WBCs    RBC 5.34 4.50 - 5.90 x10*6/uL    Hemoglobin 16.3 13.5 - 17.5 g/dL    Hematocrit 49.6 41.0 - 52.0 %    MCV 93 80 - 100 fL    MCH 30.5 26.0 - 34.0 pg    MCHC 32.9 32.0 - 36.0 g/dL    RDW 14.7 (H) 11.5 - 14.5 %    Platelets 186 150 - 450 x10*3/uL    Neutrophils % 94.0 40.0 - 80.0 %    Immature Granulocytes %, Automated 0.4 0.0 - 0.9 %    Lymphocytes % 2.5 13.0 - 44.0 %    Monocytes % 2.5 2.0 - 10.0 %    Eosinophils % 0.2 0.0 - 6.0 %    Basophils % 0.4 0.0 - 2.0 %    Neutrophils Absolute 11.15 (H) 1.20 - 7.70 x10*3/uL    Immature Granulocytes Absolute, Automated 0.05 0.00 - 0.70 x10*3/uL    Lymphocytes Absolute 0.30 (L) 1.20 - 4.80 x10*3/uL    Monocytes Absolute 0.30 0.10 - 1.00 x10*3/uL    Eosinophils Absolute 0.02 0.00 - 0.70 x10*3/uL    Basophils Absolute 0.05 0.00 - 0.10 x10*3/uL   B-Type Natriuretic Peptide   Result Value Ref Range    BNP 73 0 - 99 pg/mL   Troponin I, High Sensitivity, Initial   Result Value Ref Range    Troponin I, High Sensitivity 6 0 - 20 ng/L   RSV PCR   Result Value Ref Range    RSV PCR Not Detected Not Detected   Sars-CoV-2 PCR   Result Value Ref Range    Coronavirus 2019, PCR Not Detected Not Detected   Influenza A, and B PCR   Result Value Ref Range    Flu A Result Not Detected Not Detected    Flu B Result Not Detected Not Detected   Troponin, High Sensitivity, 1 Hour   Result Value Ref Range    Troponin I, High Sensitivity 5 0 - 20 ng/L   Blood Gas Lactic Acid, Venous   Result Value Ref Range    POCT Lactate, Venous 2.0 0.4 - 2.0 mmol/L             Assessment/Plan     Pneumonia Right Lung  -CXR shows consolidation R lung base.   -Currently on azithromycin and rocephin, will stop the rocephin and change to zosyn for  broader coverage as he comes from a group home and to cover in case this was aspiration.   -Legionella and strep pending.  -Wean O2 as sats allow.   -IVF hydration as he is fatigued and has poor PO intake, appears dry on exam.   -Sputum culture if able.   -Will have ST eval to rule out any aspiration cause.   -Check MRSA swab.     Acute Hypoxic Respiratory Failure  -Secondary to Pneumonia.   -Currently requiring 5L NC, wean as sats allow.   -Consult Pulmonary.     CKD  -Appears baseline creat ~1.5, currently 1.6.   -IVF hydration, he appears dry on exam.   -Monitor.   -Renally dose abx.     Hx HTN now with Hypotension  -BP low this AM, will start IVF hydration.   -He is on no blood pressure lowering medication.   -Monitor closely.     Anxiety  -Continue home medication.     Downs Syndrome  -Supportive care.   -Nonverbal at baseline.   -Lives in group home.     Hypothyroidism  -Continue Synthroid.     DVT Risk  -Heparin subcutaneous.     Plan  Pulmonary consulted.   Adjust abx for broader coverage.   IVF hydration.   Check MRSA swab, sputum if able.   Wean O2 as sats allow.   Will get ST eval to rule out aspiration cause.   PT/OT evals.         KRISTAL Mcneil-CNP

## 2024-10-20 NOTE — ASSESSMENT & PLAN NOTE
- SOB, requiring NC  - Wean oxygen  - Start Ceftriaxone and Azithromycin  - Procal  - Urine antigens  - PT/OT

## 2024-10-20 NOTE — CARE PLAN
The patient's goals for the shift include  Wean Oxygen as tolerated    The clinical goals for the shift include  Antibiotics, Labs, Monitor Oxygen    Over the shift, the patient did not make progress toward the following goals. Barriers to progression include none. Recommendations to address these barriers include none.

## 2024-10-20 NOTE — ED PROVIDER NOTES
EMERGENCY DEPARTMENT ENCOUNTER      Pt Name: Cyril Hernandez  MRN: [id number]  Birthdate 1965  Date of evaluation: 10/20/2024  Provider: Tracy Raphael DO    CHIEF COMPLAINT       Chief Complaint   Patient presents with    Shortness of Breath     Patient came in from group home afrter having difficulty breathing and sputum since 2000. Patient was at 78% when EMS arrive, pt also has down syndrome and is nonverbal.          HISTORY OF PRESENT ILLNESS    HPI   59-year-old male with past medical history significant for trisomy who presents to the emergency department for shortness of breath from his care home.  Patient is nonverbal at baseline.  Report received from EMS is that the patient has been having increased work of breathing for most of the day, this evening got worse around 8 PM, and that is why they were called.  They found him hypoxic at around 78 on room air, started him on nonrebreather and transported him to the emergency department.  Chart review shows this patient has a history of prior pacer placement, is on a statin and albuterol.          Nursing Notes were reviewed.       PAST MEDICAL HISTORY   Patient History   Past Medical History:   Diagnosis Date    Hypoxemia 04/16/2015    Hypoxia         SURGICAL HISTORY     No past surgical history on file.      CURRENT MEDICATIONS       Current Discharge Medication List        CONTINUE these medications which have NOT CHANGED    Details   acetaminophen (Tylenol) 500 mg capsule Take 1 capsule (500 mg) by mouth every 4 hours if needed for moderate pain (4 - 6) or fever (temp greater than 38.0 C).      acetaminophen (Tylenol) 500 mg tablet GIVE 1 TABLET BY MOUTH EVERY 6 HOURS AS NEEDED FOR HEADACHE (TAPPING CHIN), FEVER ABOVE 100F OR PAIN/LIMPING *SEE MAR*  Qty: 30 tablet, Refills: 0    Associated Diagnoses: Acute non intractable tension-type headache      albuterol (Ventolin HFA) 90 mcg/actuation inhaler Inhale 2 puffs every 4 hours if needed for  wheezing.  Qty: 18 g, Refills: 3    Associated Diagnoses: Moderate asthma, unspecified whether complicated, unspecified whether persistent ([facility])      albuterol 2.5 mg /3 mL (0.083 %) nebulizer solution Take 3 mL by nebulization 3 times a day.  Qty: 270 mL, Refills: 3    Associated Diagnoses: Chronic bronchitis, unspecified chronic bronchitis type (Multi)      alum-mag hydroxide-simeth (Mylanta) 200-200-20 mg/5 mL oral suspension Take by mouth every 6 hours if needed for indigestion or heartburn. Take 2 tablespoons by mouth every 2 hours as needed for indigestion max of 6 doses in 24 hours      atorvastatin (Lipitor) 20 mg tablet Take 1 tablet (20 mg) by mouth once daily.  Qty: 90 tablet, Refills: 1    Associated Diagnoses: Hyperlipidemia, unspecified hyperlipidemia type      benzonatate (Tessalon) 100 mg capsule Take 1 capsule (100 mg) by mouth 3 times a day as needed for cough. Do not crush or chew.  Qty: 42 capsule, Refills: 0    Associated Diagnoses: Chronic bronchitis, unspecified chronic bronchitis type (Multi)      chlorhexidine (Peridex) 0.12 % solution SWAB ONTO TEETH AND GUMS TWICE DAILY FOR PREVENTION OF INFECTION *CALL PHARMACY FOR REFILLS*  Qty: 12 mL, Refills: 3    Associated Diagnoses: Severe intellectual disability      chlorhexidine gluconate, bulk, 20 % solution Take 0.18 g by mouth 2 times a day. Swab teeth and gums twice daily to prevent infection      dimethicone/colloidal oatmeal (AVEENO TOP)       docusate calcium (Surfak) 240 mg capsule Take 1 capsule (240 mg) by mouth once daily.      docusate sodium 250 mg capsule Take 1 capsule (250 mg) by mouth once daily.  Qty: 90 capsule, Refills: 1    Associated Diagnoses: Severe intellectual disability      doxycycline (Monodox) 100 mg capsule Take 1 capsule (100 mg) by mouth 2 times a day for 10 days. Take with at least 8 ounces (large glass) of water, do not lie down for 30 minutes after  Qty: 20 capsule, Refills: 0    Associated Diagnoses:  Chronic bronchitis, unspecified chronic bronchitis type (Multi)      ergocalciferol (Vitamin D-2) 1.25 MG (51503 UT) capsule Take 1 capsule (50,000 Units) by mouth 1 (one) time per week.  Qty: 12 capsule, Refills: 1    Associated Diagnoses: Severe intellectual disability      famotidine (Pepcid) 20 mg tablet Take 1 tablet (20 mg) by mouth 2 times a day.  Qty: 180 tablet, Refills: 1    Associated Diagnoses: Chronic GERD      gentamicin sulfate (GARAMYCIN OPHT) Administer 2 drops into both eyes 3 times a day.      hydrocortisone 1 % cream Apply topically 2 times a day. Apply small amount as needed to skin irritation no more than 2 times per day for 5 days.      !! hydrOXYzine HCL (Atarax) 25 mg tablet 1 tablet (25 mg).      !! hydrOXYzine HCL (Atarax) 50 mg tablet Take 2 tablets (100 mg) by mouth every 1 hour if needed for anxiety. Take 1/2 hour prior to procedure as needed for anxiety.      ibuprofen 400 mg tablet Take 1 tablet (400 mg) by mouth.      levothyroxine (Synthroid, Levoxyl) 112 mcg tablet Take 1 tablet (112 mcg) by mouth early in the morning.. Take on an empty stomach at the same time each day, either 30 to 60 minutes prior to breakfast  Qty: 90 tablet, Refills: 1    Associated Diagnoses: Hypothyroidism, unspecified type      loratadine (Claritin) 10 mg tablet Take 1 tablet (10 mg) by mouth once daily.  Qty: 90 tablet, Refills: 1    Associated Diagnoses: Gastroesophageal reflux disease, unspecified whether esophagitis present      nitrofurantoin, macrocrystal-monohydrate, (Macrobid) 100 mg capsule Take 1 capsule (100 mg) by mouth 2 times a day.      ondansetron (Zofran) 4 mg tablet Take 1 tablet (4 mg) by mouth every 8 hours if needed.      ondansetron ODT (Zofran-ODT) 4 mg disintegrating tablet Take 1 tablet (4 mg) by mouth every 6 hours if needed.      !! polyethylene glycol (Glycolax, Miralax) 17 gram packet GIVE 1 POWDER PACK BY MOUTH ONCE DAILY *DISSOLVE IN 4-8 OZ WATER/JUICE  Qty: 100 each, Refills:  1    Associated Diagnoses: Severe intellectual disability      !! POLYETHYLENE GLYCOL 3350 ORAL Take 17 g by mouth 3 times a week. Take mon,wed and fri      raNITIdine (Zantac) 150 mg tablet Take 1 tablet (150 mg) by mouth.       !! - Potential duplicate medications found. Please discuss with provider.          ALLERGIES     Ascorbic acid, Inhaled anesthetics (halogen based), Lactase, Peas, Sulfa (sulfonamide antibiotics), Chocolate hazelnut flavor, and Alcorn juice    FAMILY HISTORY       Family History   Problem Relation Name Age of Onset    No Known Problems Mother      No Known Problems Father            SOCIAL HISTORY       Social History     Socioeconomic History    Marital status: Single   Tobacco Use    Smoking status: Never    Smokeless tobacco: Never   Vaping Use    Vaping status: Never Used   Substance and Sexual Activity    Alcohol use: Never    Drug use: Never    Sexual activity: Never     Social Drivers of Health     Financial Resource Strain: Low Risk  (10/20/2024)    Overall Financial Resource Strain (CARDIA)     Difficulty of Paying Living Expenses: Not hard at all   Food Insecurity: No Food Insecurity (10/20/2024)    Hunger Vital Sign     Worried About Running Out of Food in the Last Year: Never true     Ran Out of Food in the Last Year: Never true   Transportation Needs: No Transportation Needs (10/20/2024)    PRAPARE - Transportation     Lack of Transportation (Medical): No     Lack of Transportation (Non-Medical): No   Physical Activity: Inactive (10/20/2024)    Exercise Vital Sign     Days of Exercise per Week: 0 days     Minutes of Exercise per Session: 0 min   Stress: Patient Unable To Answer (10/20/2024)    [employer] [place] - Occupational Stress Questionnaire     Feeling of Stress : Patient unable to answer   Social Connections: Unknown (10/20/2024)    Social Connection and Isolation Panel [NHANES]     Frequency of Communication with Friends and Family: More than  three times a week     Frequency of Social Gatherings with Friends and Family: More than three times a week     Attends [employer] Services: Never     Active Member of Clubs or Organizations: No     Attends Club or Organization Meetings: Never     Marital Status: Patient unable to answer   Intimate Partner Violence: Not At Risk (10/20/2024)    Humiliation, Afraid, Rape, and Kick questionnaire     Fear of Current or Ex-Partner: No     Emotionally Abused: No     Physically Abused: No     Sexually Abused: No   Housing Stability: Low Risk  (10/20/2024)    Housing Stability Vital Sign     Unable to Pay for Housing in the Last Year: No     Number of Times Moved in the Last Year: 0     Homeless in the Last Year: No       SCREENINGS                             PHYSICAL EXAM    (up to 7 for level 4, 8 or more for level 5)   Physical Exam   ED Triage Vitals [10/20/24 0029]   Temperature Heart Rate Respirations BP   36.9 °C (98.4 °F) 72 (!) 22 93/77      Pulse Ox Temp Source Heart Rate Source Patient Position   94 % Temporal Monitor --      BP Location FiO2 (%)     -- --       Physical Exam  Vitals and nursing note reviewed.   Constitutional:       General: He is not in acute distress.     Appearance: He is well-developed.   HENT:      Head: Normocephalic and atraumatic.      Mouth/Throat:      Mouth: Mucous membranes are dry.   Eyes:      Conjunctiva/sclera: Conjunctivae normal.   Cardiovascular:      Rate and Rhythm: Normal rate and regular rhythm.      Heart sounds: No murmur heard.  Pulmonary:      Effort: Tachypnea and accessory muscle usage present. No respiratory distress.      Breath sounds: Examination of the right-upper field reveals rhonchi. Examination of the right-middle field reveals rhonchi. Examination of the right-lower field reveals decreased breath sounds. Examination of the left-lower field reveals decreased breath sounds. Decreased breath sounds and rhonchi present.   Abdominal:      Palpations: Abdomen is  soft.      Tenderness: There is no abdominal tenderness.   Musculoskeletal:         General: No swelling.      Cervical back: Neck supple.   Skin:     General: Skin is warm and dry.      Capillary Refill: Capillary refill takes less than 2 seconds.   Neurological:      Mental Status: He is alert.   Psychiatric:         Mood and Affect: Mood normal.          DIAGNOSTIC RESULTS     LABS:  Labs Reviewed   BLOOD GAS VENOUS FULL PANEL - Abnormal       Result Value    POCT pH, Venous 7.31 (*)     POCT pCO2, Venous 55 (*)     POCT pO2, Venous 23 (*)     POCT SO2, Venous 19 (*)     POCT Oxy Hemoglobin, Venous 18.8 (*)     POCT Hematocrit Calculated, Venous 51.0      POCT Sodium, Venous 139      POCT Potassium, Venous 4.7      POCT Chloride, Venous 105      POCT Ionized Calicum, Venous 1.25      POCT Glucose, Venous 119 (*)     POCT Lactate, Venous 3.6 (*)     POCT Base Excess, Venous 0.0      POCT HCO3 Calculated, Venous 27.7 (*)     POCT Hemoglobin, Venous 16.9      POCT Anion Gap, Venous 11.0      Patient Temperature 37.0      FiO2 0     COMPREHENSIVE METABOLIC PANEL - Abnormal    Glucose 110 (*)     Sodium 138      Potassium 4.3      Chloride 105      Bicarbonate 25      Anion Gap 12      Urea Nitrogen 22      Creatinine 1.66 (*)     eGFR 47 (*)     Calcium 9.4      Albumin 3.7      Alkaline Phosphatase 79      Total Protein 7.5      AST 28      Bilirubin, Total 0.7      ALT 33     CBC WITH AUTO DIFFERENTIAL - Abnormal    WBC 11.9 (*)     nRBC 0.0      RBC 5.34      Hemoglobin 16.3      Hematocrit 49.6      MCV 93      MCH 30.5      MCHC 32.9      RDW 14.7 (*)     Platelets 186      Neutrophils % 94.0      Immature Granulocytes %, Automated 0.4      Lymphocytes % 2.5      Monocytes % 2.5      Eosinophils % 0.2      Basophils % 0.4      Neutrophils Absolute 11.15 (*)     Immature Granulocytes Absolute, Automated 0.05      Lymphocytes Absolute 0.30 (*)     Monocytes Absolute 0.30      Eosinophils Absolute 0.02       Basophils Absolute 0.05     B-TYPE NATRIURETIC PEPTIDE - Normal    BNP 73      Narrative:        <100 pg/mL - Heart failure unlikely  100-299 pg/mL - Intermediate probability of acute heart                  failure exacerbation. Correlate with clinical                  context and patient history.    >=300 pg/mL - Heart Failure likely. Correlate with clinical                  context and patient history.    BNP testing is performed using different testing methodology at [facility] than at other [facility]. Direct result comparisons should only be made within the same method.      SERIAL TROPONIN-INITIAL - Normal    Troponin I, High Sensitivity 6      Narrative:     Less than 99th percentile of normal range cutoff-  Female and children under 18 years old <14 ng/L; Male <21 ng/L: Negative  Repeat testing should be performed if clinically indicated.     Female and children under 18 years old 14-50 ng/L; Male 21-50 ng/L:  Consistent with possible cardiac damage and possible increased clinical   risk. Serial measurements may help to assess extent of myocardial damage.     >50 ng/L: Consistent with cardiac damage, increased clinical risk and  myocardial infarction. Serial measurements may help assess extent of   myocardial damage.      NOTE: Children less than 1 year old may have higher baseline troponin   levels and results should be interpreted in conjunction with the overall   clinical context.     NOTE: Troponin I testing is performed using a different   testing methodology at [facility] than at other   [facility]. Direct result comparisons should only   be made within the same method.   RSV PCR - Normal    RSV PCR Not Detected      Narrative:     This assay is an FDA-cleared, in vitro diagnostic nucleic acid amplification test for the detection of RSV from nasopharyngeal specimens, and has been validated for use at [facility]. Negative results do not  preclude RSV infections, and should not be used as the sole basis for diagnosis, treatment, or other management decisions. If Influenza A/B and RSV PCR results are negative, testing for Parainfluenza virus, Adenovirus and Metapneumovirus is routinely performed for pediatric oncology and intensive care inpatients at [facility] and is available on other patients by placing an add-on request.       SARS-COV-2 PCR - Normal    Coronavirus 2019, PCR Not Detected      Narrative:     This assay has received FDA Emergency Use Authorization (EUA) and is only authorized for the duration of time that circumstances exist to justify the authorization of the emergency use of in vitro diagnostic tests for the detection of SARS-CoV-2 virus and/or diagnosis of COVID-19 infection under section 564(b)(1) of the Act, 21 U.S.C. 360bbb-3(b)(1). This assay is an in vitro diagnostic nucleic acid amplification test for the qualitative detection of SARS-CoV-2 from nasopharyngeal specimens and has been validated for use at [facility]. Negative results do not preclude COVID-19 infections and should not be used as the sole basis for diagnosis, treatment, or other management decisions.     INFLUENZA A AND B PCR - Normal    Flu A Result Not Detected      Flu B Result Not Detected      Narrative:     This assay is an in vitro diagnostic multiplex nucleic acid amplification test for the detection and discrimination of Influenza A & B from nasopharyngeal specimens, and has been validated for use at [facility]. Negative results do not preclude Influenza A/B infections, and should not be used as the sole basis for diagnosis, treatment, or other management decisions. If Influenza A/B and RSV PCR results are negative, testing for Parainfluenza virus, Adenovirus and Metapneumovirus is routinely performed for [facility] [place] and intensive care inpatients, and is available on other patients by placing an  add-on request.   SERIAL TROPONIN, 1 HOUR - Normal    Troponin I, High Sensitivity 5      Narrative:     Less than 99th percentile of normal range cutoff-  Female and children under 18 years old <14 ng/L; Male <21 ng/L: Negative  Repeat testing should be performed if clinically indicated.     Female and children under 18 years old 14-50 ng/L; Male 21-50 ng/L:  Consistent with possible cardiac damage and possible increased clinical   risk. Serial measurements may help to assess extent of myocardial damage.     >50 ng/L: Consistent with cardiac damage, increased clinical risk and  myocardial infarction. Serial measurements may help assess extent of   myocardial damage.      NOTE: Children less than 1 year old may have higher baseline troponin   levels and results should be interpreted in conjunction with the overall   clinical context.     NOTE: Troponin I testing is performed using a different   testing methodology at [facility] than at other   [facility]. Direct result comparisons should only   be made within the same method.   BLOOD GAS LACTIC ACID, VENOUS - Normal    POCT Lactate, Venous 2.0     LEGIONELLA ANTIGEN, URINE   STREPTOCOCCUS PNEUMONIAE ANTIGEN, URINE   TROPONIN SERIES- (INITIAL, 1 HR)    Narrative:     The following orders were created for panel order Troponin I Series, High Sensitivity (0, 1 HR).  Procedure                               Abnormality         Status                     ---------                               -----------         ------                     Troponin I, High Sensiti...[[id number]]  Normal              Final result               Troponin, High Sensitivi...[[id number]]  Normal              Final result                 Please view results for these tests on the individual orders.   PROCALCITONIN       All other labs were within normal range or not returned as of this dictation.    Imaging  XR chest 1 view   Final Result   Right basilar airspace disease,  suspicious for pneumonia.   Radiographic follow-up to complete resolution is recommended.        Gastric distention.        MACRO:   None        Signed by: Lyn Rivera 10/20/2024 2:05 AM   Dictation workstation:   [id number]              Procedures  Procedures     EMERGENCY DEPARTMENT COURSE/MDM:   Cyril Hernandez is a 59 y.o. male presenting to the ED for evaluation of had concerns including Shortness of Breath (Patient came in from group home afrter having difficulty breathing and sputum since 2000. Patient was at 78% when EMS arrive, pt also has down syndrome and is nonverbal. )..   Medical Decision Making  59-year-old male with increased work of breathing and hypoxia, right-sided rhonchi, concern for pneumonia.  Covered with Rocephin azithromycin started on nasal cannula.  Does not meet sepsis criteria, however pneumonia with new oxygen requirement meets admission criteria, will bring him in for further management        ED Course as of 10/20/24 0653   Sun Oct 20, 2024   0144 Patient does not meet SIRS criteria.  He does have significant patchy infiltrates in the right side, consistent with his exam findings, concerning for pneumonia, covered with Rocephin and azithromycin [AS]   0147 EKG performed at 00 42 and read by me shows an atrial paced rhythm, with an upright axis, 62 bpm, normal DE interval, QRS is 116, QTc is 426.  Incomplete right bundle branch block pattern with no acute injury pattern [AS]   0204 Initial troponin is negative, BNP is not elevated, kidney and liver function appear near baseline, white count is 11.9, no signs of anemia or thrombocytopenia.  COVID and flu swabs pending. [AS]      ED Course User Index  [AS] Tracy Raphael DO         Diagnoses as of 10/20/24 0653   Pneumonia of right lung due to infectious organism, unspecified part of lung   Hypoxia          External records reviewed: I reviewed external records including outpatient, PCP records, and prior discharge  summaries      Tracy Raphael DO  Emergency Medicine    The above documentation was completed with the use of speech recognition software. It may contain dictation errors secondary to limitations of the software.      ED Medications administered this visit:    Medications   oxygen (O2) therapy (has no administration in time range)   atorvastatin (Lipitor) tablet 20 mg (has no administration in time range)   famotidine (Pepcid) tablet 20 mg (has no administration in time range)   hydrOXYzine HCL (Atarax) tablet 100 mg (has no administration in time range)   levothyroxine (Synthroid, Levoxyl) tablet 112 mcg (has no administration in time range)   heparin (porcine) injection 5,000 Units (has no administration in time range)   cefTRIAXone (Rocephin) 2 g in dextrose (iso) IV 50 mL (has no administration in time range)   azithromycin 500 mg in dextrose 5% 250 mL IV (has no administration in time range)   acetaminophen (Tylenol) tablet 650 mg (has no administration in time range)     Or   acetaminophen (Tylenol) oral liquid 650 mg (has no administration in time range)     Or   acetaminophen (Tylenol) suppository 650 mg (has no administration in time range)   polyethylene glycol (Glycolax, Miralax) packet 17 g (has no administration in time range)   sennosides (Senokot) tablet 17.2 mg (has no administration in time range)   cefTRIAXone (Rocephin) 2 g in dextrose (iso) IV 50 mL (0 g intravenous Stopped 10/20/24 0201)   azithromycin 500 mg in dextrose 5% 250 mL IV (0 mg intravenous Stopped 10/20/24 0346)       New Prescriptions from this visit:    Current Discharge Medication List          Final Impression:   1. Pneumonia of right lung due to infectious organism, unspecified part of lung    2. Hypoxia          (Please note that portions of this note were completed with a voice recognition program.  Efforts were made to edit the dictations but occasionally words are mis-transcribed.)     Tracy Raphael DO  10/20/24 6660

## 2024-10-21 LAB
ANION GAP SERPL CALCULATED.3IONS-SCNC: 12 MMOL/L (ref 10–20)
BUN SERPL-MCNC: 23 MG/DL (ref 6–23)
CALCIUM SERPL-MCNC: 8.2 MG/DL (ref 8.6–10.3)
CHLORIDE SERPL-SCNC: 112 MMOL/L (ref 98–107)
CO2 SERPL-SCNC: 18 MMOL/L (ref 21–32)
CREAT SERPL-MCNC: 1.61 MG/DL (ref 0.5–1.3)
EGFRCR SERPLBLD CKD-EPI 2021: 49 ML/MIN/1.73M*2
ERYTHROCYTE [DISTWIDTH] IN BLOOD BY AUTOMATED COUNT: 15.4 % (ref 11.5–14.5)
GLUCOSE SERPL-MCNC: 92 MG/DL (ref 74–99)
HCT VFR BLD AUTO: 37.8 % (ref 41–52)
HGB BLD-MCNC: 12.3 G/DL (ref 13.5–17.5)
MCH RBC QN AUTO: 31.1 PG (ref 26–34)
MCHC RBC AUTO-ENTMCNC: 32.5 G/DL (ref 32–36)
MCV RBC AUTO: 96 FL (ref 80–100)
NRBC BLD-RTO: 0 /100 WBCS (ref 0–0)
PLATELET # BLD AUTO: 128 X10*3/UL (ref 150–450)
POTASSIUM SERPL-SCNC: 4.1 MMOL/L (ref 3.5–5.3)
PROCALCITONIN SERPL-MCNC: 2.67 NG/ML
RBC # BLD AUTO: 3.95 X10*6/UL (ref 4.5–5.9)
SODIUM SERPL-SCNC: 138 MMOL/L (ref 136–145)
WBC # BLD AUTO: 10.6 X10*3/UL (ref 4.4–11.3)

## 2024-10-21 PROCEDURE — 36415 COLL VENOUS BLD VENIPUNCTURE: CPT | Performed by: NURSE PRACTITIONER

## 2024-10-21 PROCEDURE — 2500000001 HC RX 250 WO HCPCS SELF ADMINISTERED DRUGS (ALT 637 FOR MEDICARE OP): Performed by: STUDENT IN AN ORGANIZED HEALTH CARE EDUCATION/TRAINING PROGRAM

## 2024-10-21 PROCEDURE — 97161 PT EVAL LOW COMPLEX 20 MIN: CPT | Mod: GP

## 2024-10-21 PROCEDURE — 94760 N-INVAS EAR/PLS OXIMETRY 1: CPT

## 2024-10-21 PROCEDURE — 2500000002 HC RX 250 W HCPCS SELF ADMINISTERED DRUGS (ALT 637 FOR MEDICARE OP, ALT 636 FOR OP/ED): Performed by: NURSE PRACTITIONER

## 2024-10-21 PROCEDURE — 2060000001 HC INTERMEDIATE ICU ROOM DAILY

## 2024-10-21 PROCEDURE — 99232 SBSQ HOSP IP/OBS MODERATE 35: CPT | Performed by: NURSE PRACTITIONER

## 2024-10-21 PROCEDURE — 80048 BASIC METABOLIC PNL TOTAL CA: CPT | Performed by: NURSE PRACTITIONER

## 2024-10-21 PROCEDURE — 97165 OT EVAL LOW COMPLEX 30 MIN: CPT | Mod: GO

## 2024-10-21 PROCEDURE — 9420000001 HC RT PATIENT EDUCATION 5 MIN

## 2024-10-21 PROCEDURE — 94640 AIRWAY INHALATION TREATMENT: CPT

## 2024-10-21 PROCEDURE — 94664 DEMO&/EVAL PT USE INHALER: CPT

## 2024-10-21 PROCEDURE — 92610 EVALUATE SWALLOWING FUNCTION: CPT | Mod: GN

## 2024-10-21 PROCEDURE — 2500000004 HC RX 250 GENERAL PHARMACY W/ HCPCS (ALT 636 FOR OP/ED): Performed by: STUDENT IN AN ORGANIZED HEALTH CARE EDUCATION/TRAINING PROGRAM

## 2024-10-21 PROCEDURE — 94762 N-INVAS EAR/PLS OXIMTRY CONT: CPT

## 2024-10-21 PROCEDURE — 2500000004 HC RX 250 GENERAL PHARMACY W/ HCPCS (ALT 636 FOR OP/ED): Performed by: NURSE PRACTITIONER

## 2024-10-21 PROCEDURE — 85027 COMPLETE CBC AUTOMATED: CPT | Performed by: NURSE PRACTITIONER

## 2024-10-21 RX ORDER — IPRATROPIUM BROMIDE AND ALBUTEROL SULFATE 2.5; .5 MG/3ML; MG/3ML
3 SOLUTION RESPIRATORY (INHALATION)
Status: DISCONTINUED | OUTPATIENT
Start: 2024-10-21 | End: 2024-10-21

## 2024-10-21 RX ORDER — IPRATROPIUM BROMIDE AND ALBUTEROL SULFATE 2.5; .5 MG/3ML; MG/3ML
3 SOLUTION RESPIRATORY (INHALATION)
Status: DISCONTINUED | OUTPATIENT
Start: 2024-10-22 | End: 2024-10-22 | Stop reason: HOSPADM

## 2024-10-21 RX ORDER — IPRATROPIUM BROMIDE AND ALBUTEROL SULFATE 2.5; .5 MG/3ML; MG/3ML
3 SOLUTION RESPIRATORY (INHALATION) EVERY 2 HOUR PRN
Status: DISCONTINUED | OUTPATIENT
Start: 2024-10-21 | End: 2024-10-22 | Stop reason: HOSPADM

## 2024-10-21 RX ADMIN — POLYETHYLENE GLYCOL 3350 17 G: 17 POWDER, FOR SOLUTION ORAL at 09:53

## 2024-10-21 RX ADMIN — SENNOSIDES 17.2 MG: 8.6 TABLET, FILM COATED ORAL at 09:53

## 2024-10-21 RX ADMIN — HEPARIN SODIUM 5000 UNITS: 5000 INJECTION, SOLUTION INTRAVENOUS; SUBCUTANEOUS at 20:43

## 2024-10-21 RX ADMIN — ATORVASTATIN CALCIUM 20 MG: 20 TABLET, FILM COATED ORAL at 20:43

## 2024-10-21 RX ADMIN — PIPERACILLIN SODIUM AND TAZOBACTAM SODIUM 2.25 G: 2; .25 INJECTION, SOLUTION INTRAVENOUS at 12:41

## 2024-10-21 RX ADMIN — PIPERACILLIN SODIUM AND TAZOBACTAM SODIUM 2.25 G: 2; .25 INJECTION, SOLUTION INTRAVENOUS at 00:01

## 2024-10-21 RX ADMIN — PIPERACILLIN SODIUM AND TAZOBACTAM SODIUM 2.25 G: 2; .25 INJECTION, SOLUTION INTRAVENOUS at 23:47

## 2024-10-21 RX ADMIN — FAMOTIDINE 20 MG: 20 TABLET ORAL at 09:53

## 2024-10-21 RX ADMIN — PIPERACILLIN SODIUM AND TAZOBACTAM SODIUM 2.25 G: 2; .25 INJECTION, SOLUTION INTRAVENOUS at 05:26

## 2024-10-21 RX ADMIN — FAMOTIDINE 20 MG: 20 TABLET ORAL at 20:43

## 2024-10-21 RX ADMIN — LEVOTHYROXINE SODIUM 112 MCG: 0.11 TABLET ORAL at 05:26

## 2024-10-21 RX ADMIN — HEPARIN SODIUM 5000 UNITS: 5000 INJECTION, SOLUTION INTRAVENOUS; SUBCUTANEOUS at 09:53

## 2024-10-21 RX ADMIN — DEXTROSE MONOHYDRATE 500 MG: 50 INJECTION, SOLUTION INTRAVENOUS at 02:42

## 2024-10-21 RX ADMIN — IPRATROPIUM BROMIDE AND ALBUTEROL SULFATE 3 ML: 2.5; .5 SOLUTION RESPIRATORY (INHALATION) at 19:30

## 2024-10-21 RX ADMIN — PIPERACILLIN SODIUM AND TAZOBACTAM SODIUM 2.25 G: 2; .25 INJECTION, SOLUTION INTRAVENOUS at 17:55

## 2024-10-21 RX ADMIN — SENNOSIDES 17.2 MG: 8.6 TABLET, FILM COATED ORAL at 20:43

## 2024-10-21 RX ADMIN — HYDROXYZINE HYDROCHLORIDE 100 MG: 25 TABLET, FILM COATED ORAL at 20:43

## 2024-10-21 ASSESSMENT — ACTIVITIES OF DAILY LIVING (ADL)
BATHING_ASSISTANCE: TOTAL
ADL_ASSISTANCE: NEEDS ASSISTANCE
ADL_ASSISTANCE: NEEDS ASSISTANCE

## 2024-10-21 ASSESSMENT — COGNITIVE AND FUNCTIONAL STATUS - GENERAL
STANDING UP FROM CHAIR USING ARMS: A LOT
TOILETING: TOTAL
MOBILITY SCORE: 10
HELP NEEDED FOR BATHING: TOTAL
TURNING FROM BACK TO SIDE WHILE IN FLAT BAD: A LOT
DAILY ACTIVITIY SCORE: 6
DRESSING REGULAR LOWER BODY CLOTHING: TOTAL
TOILETING: TOTAL
PERSONAL GROOMING: TOTAL
TURNING FROM BACK TO SIDE WHILE IN FLAT BAD: A LOT
MOVING FROM LYING ON BACK TO SITTING ON SIDE OF FLAT BED WITH BEDRAILS: A LOT
MOVING TO AND FROM BED TO CHAIR: A LOT
EATING MEALS: TOTAL
MOVING FROM LYING ON BACK TO SITTING ON SIDE OF FLAT BED WITH BEDRAILS: A LOT
WALKING IN HOSPITAL ROOM: TOTAL
MOVING TO AND FROM BED TO CHAIR: A LOT
DRESSING REGULAR UPPER BODY CLOTHING: TOTAL
CLIMB 3 TO 5 STEPS WITH RAILING: TOTAL
STANDING UP FROM CHAIR USING ARMS: A LOT
CLIMB 3 TO 5 STEPS WITH RAILING: TOTAL
WALKING IN HOSPITAL ROOM: A LOT
MOBILITY SCORE: 11
EATING MEALS: TOTAL
DRESSING REGULAR UPPER BODY CLOTHING: TOTAL
DRESSING REGULAR LOWER BODY CLOTHING: TOTAL
PERSONAL GROOMING: TOTAL
HELP NEEDED FOR BATHING: TOTAL
DAILY ACTIVITIY SCORE: 6

## 2024-10-21 ASSESSMENT — PAIN SCALES - GENERAL
PAINLEVEL_OUTOF10: 0 - NO PAIN

## 2024-10-21 ASSESSMENT — PAIN - FUNCTIONAL ASSESSMENT
PAIN_FUNCTIONAL_ASSESSMENT: FLACC (FACE, LEGS, ACTIVITY, CRY, CONSOLABILITY)

## 2024-10-21 ASSESSMENT — PAIN SCALES - WONG BAKER: WONGBAKER_NUMERICALRESPONSE: NO HURT

## 2024-10-21 NOTE — PROGRESS NOTES
"Cyril Hernandez \"Briana" is a 59 y.o. male on day 1 of admission seen in follow-up for acute hypoxic respiratory failure, pneumonia    Subjective   Family at bedside.  Nonverbal.  Resting comfortably.  On room air; O2 sats 96%.   Afebrile.       Objective     Physical Exam  Vitals and nursing note reviewed.   Constitutional:       Appearance: Normal appearance.   HENT:      Head: Normocephalic and atraumatic.      Nose: Nose normal.      Mouth/Throat:      Mouth: Mucous membranes are moist.   Eyes:      Extraocular Movements: Extraocular movements intact.      Conjunctiva/sclera: Conjunctivae normal.      Pupils: Pupils are equal, round, and reactive to light.   Cardiovascular:      Rate and Rhythm: Normal rate and regular rhythm.      Pulses: Normal pulses.      Heart sounds: Normal heart sounds.   Pulmonary:      Effort: Pulmonary effort is normal.      Breath sounds: Rhonchi present.      Comments: Coarse breath sounds bilaterally.  Abdominal:      General: Bowel sounds are normal.      Palpations: Abdomen is soft.   Musculoskeletal:         General: Normal range of motion.   Skin:     General: Skin is warm and dry.      Capillary Refill: Capillary refill takes less than 2 seconds.   Neurological:      General: No focal deficit present.      Mental Status: He is alert and oriented to person, place, and time.   Psychiatric:         Mood and Affect: Mood normal.         Behavior: Behavior normal.         Last Recorded Vitals  Blood pressure 113/68, pulse 60, temperature 36.3 °C (97.3 °F), temperature source Temporal, resp. rate 19, height 1.499 m (4' 11\"), weight 65.4 kg (144 lb 2.9 oz), SpO2 96%.  Intake/Output last 3 Shifts:  I/O last 3 completed shifts:  In: 2181.5 (33.4 mL/kg) [I.V.:732.5 (11.2 mL/kg); IV Piggyback:1449]  Out: - (0 mL/kg)   Weight: 65.4 kg       Intake/Output Summary (Last 24 hours) at 10/21/2024 0932  Last data filed at 10/21/2024 0609  Gross per 24 hour   Intake 2181.5 ml   Output --   Net " 2181.5 ml      Scheduled medications:  atorvastatin, 20 mg, oral, Nightly  azithromycin, 500 mg, intravenous, q24h  famotidine, 20 mg, oral, BID  heparin, 5,000 Units, subcutaneous, q12h  levothyroxine, 112 mcg, oral, Daily  piperacillin-tazobactam, 2.25 g, intravenous, q6h  polyethylene glycol, 17 g, oral, Daily  sennosides, 2 tablet, oral, BID     PRN medications: acetaminophen **OR** acetaminophen **OR** acetaminophen, hydrOXYzine HCL, oxygen        Relevant Results  Results for orders placed or performed during the hospital encounter of 10/20/24 (from the past 24 hours)   CBC   Result Value Ref Range    WBC 10.6 4.4 - 11.3 x10*3/uL    nRBC 0.0 0.0 - 0.0 /100 WBCs    RBC 3.95 (L) 4.50 - 5.90 x10*6/uL    Hemoglobin 12.3 (L) 13.5 - 17.5 g/dL    Hematocrit 37.8 (L) 41.0 - 52.0 %    MCV 96 80 - 100 fL    MCH 31.1 26.0 - 34.0 pg    MCHC 32.5 32.0 - 36.0 g/dL    RDW 15.4 (H) 11.5 - 14.5 %    Platelets 128 (L) 150 - 450 x10*3/uL   Basic Metabolic Panel   Result Value Ref Range    Glucose 92 74 - 99 mg/dL    Sodium 138 136 - 145 mmol/L    Potassium 4.1 3.5 - 5.3 mmol/L    Chloride 112 (H) 98 - 107 mmol/L    Bicarbonate 18 (L) 21 - 32 mmol/L    Anion Gap 12 10 - 20 mmol/L    Urea Nitrogen 23 6 - 23 mg/dL    Creatinine 1.61 (H) 0.50 - 1.30 mg/dL    eGFR 49 (L) >60 mL/min/1.73m*2    Calcium 8.2 (L) 8.6 - 10.3 mg/dL        XR chest 1 view  Result Date: 10/20/2024  FINDINGS:     CARDIOMEDIASTINAL SILHOUETTE: Cardiomediastinal silhouette is normal in size and configuration. Pacemaker leads overlie the right atrium and right ventricle.   LUNGS: There is a consolidation at the right lung base. No significant pleural effusion. No pneumothorax.   ABDOMEN: Gaseous distention of the stomach.   BONES: No acute osseous abnormality.   Right basilar airspace disease, suspicious for pneumonia. Radiographic follow-up to complete resolution is recommended.   Gastric distention.        Assessment/Plan   Acute hypoxic respiratory  failure  Stable on room air  Nocturnal pulse oximetry tonight   Continuous pulse oximetry  Incentive spirometry/pulmonary hygiene    Pneumonia  Continue IV azithromycin, Zosyn  Follow-up cultures  Swallow evaluation per ST    Down syndrome  Nonverbal        Sarah Loving, APRN-CNP

## 2024-10-21 NOTE — CARE PLAN
The patient's goals for the shift include rest    The clinical goals for the shift include wean oxygen    Over the shift, the patient did not make progress toward the following goals. Barriers to progression include ***. Recommendations to address these barriers include ***.    Problem: Respiratory  Goal: Verbalize decreased shortness of breath this shift  Outcome: Not Progressing

## 2024-10-21 NOTE — PROGRESS NOTES
"Cyril Hernandez \"Elliott\" is a 59 y.o. male on day 1 of admission presenting with Pneumonia of right lung due to infectious organism, unspecified part of lung.      Subjective   Pt sitting up in bed. Uncooperative with PT and will not stand. Removing his O2 however breathing comfortably. Afebrile. BP is improved with IV fluids.      Objective     Last Recorded Vitals  /68   Pulse 60   Temp 36.3 °C (97.3 °F) (Temporal)   Resp 19   Wt 65.4 kg (144 lb 2.9 oz)   SpO2 96%   Intake/Output last 3 Shifts:    Intake/Output Summary (Last 24 hours) at 10/21/2024 1045  Last data filed at 10/21/2024 0609  Gross per 24 hour   Intake 2181.5 ml   Output --   Net 2181.5 ml       Admission Weight  Weight: 59 kg (130 lb) (10/20/24 0029)    Daily Weight  10/21/24 : 65.4 kg (144 lb 2.9 oz)    Image Results      Physical Exam  Constitutional:       Appearance: Normal appearance.      Comments: Non-verbal, MRDD   HENT:      Head: Normocephalic and atraumatic.      Nose: Nose normal.      Mouth/Throat:      Mouth: Mucous membranes are moist.      Pharynx: Oropharynx is clear.   Eyes:      Extraocular Movements: Extraocular movements intact.      Pupils: Pupils are equal, round, and reactive to light.   Cardiovascular:      Rate and Rhythm: Normal rate and regular rhythm.      Pulses: Normal pulses.   Pulmonary:      Effort: Pulmonary effort is normal.      Breath sounds: Rhonchi present.   Abdominal:      General: Abdomen is flat. Bowel sounds are normal.      Palpations: Abdomen is soft.   Genitourinary:     Comments: Incontinent of urine  Musculoskeletal:         General: Normal range of motion.   Skin:     General: Skin is warm and dry.      Capillary Refill: Capillary refill takes less than 2 seconds.   Neurological:      General: No focal deficit present.      Mental Status: He is oriented to person, place, and time.   Psychiatric:         Mood and Affect: Mood normal.         Relevant Results    Results for orders placed or " performed during the hospital encounter of 10/20/24 (from the past 24 hours)   CBC   Result Value Ref Range    WBC 10.6 4.4 - 11.3 x10*3/uL    nRBC 0.0 0.0 - 0.0 /100 WBCs    RBC 3.95 (L) 4.50 - 5.90 x10*6/uL    Hemoglobin 12.3 (L) 13.5 - 17.5 g/dL    Hematocrit 37.8 (L) 41.0 - 52.0 %    MCV 96 80 - 100 fL    MCH 31.1 26.0 - 34.0 pg    MCHC 32.5 32.0 - 36.0 g/dL    RDW 15.4 (H) 11.5 - 14.5 %    Platelets 128 (L) 150 - 450 x10*3/uL   Basic Metabolic Panel   Result Value Ref Range    Glucose 92 74 - 99 mg/dL    Sodium 138 136 - 145 mmol/L    Potassium 4.1 3.5 - 5.3 mmol/L    Chloride 112 (H) 98 - 107 mmol/L    Bicarbonate 18 (L) 21 - 32 mmol/L    Anion Gap 12 10 - 20 mmol/L    Urea Nitrogen 23 6 - 23 mg/dL    Creatinine 1.61 (H) 0.50 - 1.30 mg/dL    eGFR 49 (L) >60 mL/min/1.73m*2    Calcium 8.2 (L) 8.6 - 10.3 mg/dL      Assessment/Plan     Pneumonia Right Lung  -CXR showed consolidation R lung base. R/o aspiration.  -Continue IV Abx.   -Legionella, strep and MRSA pending.  -Sputum culture if able.   -ST to evaluate.     Acute Hypoxic Respiratory Failure  -Secondary to Pneumonia.   -Currently on RA with good O2 sats. Pt keeps removing oxygen.  -Pulmonology follows.     CKD  -Creatinine at baseline, monitor.   -Will monitor off IV fluids.    Hx HTN now with Hypotension  -BP improved with IV fluids, will monitor off IV fluids.    Anxiety  -Continue home medication.     Downs Syndrome  -Supportive care.   -Nonverbal at baseline.   -Lives in group home.     Hypothyroidism  -Continue Synthroid.     DVT Risk  -Heparin subcutaneous.     Dispo  Back to Group Home on discharge. Anticipate discharge in the next 24 hrs if remains off oxygen.     Hemalatha Perez, APRN-CNP

## 2024-10-21 NOTE — CARE PLAN
The patient's goals for the shift include rest    The clinical goals for the shift include wean o2    Over the shift, the patient did not make progress toward the following goals. Barriers to progression include none. Recommendations to address these barriers include none.

## 2024-10-21 NOTE — PROGRESS NOTES
"Occupational Therapy    Evaluation    Patient Name: Cyril Hernandez \"Briana"  MRN: [id number]  Department: [facility] 4 ICU  Room: [address] [place], [place] [address]  Today's Date: 10/21/2024  Time Calculation  Start Time: 0813  Stop Time: 0835  Time Calculation (min): 22 min    Assessment  IP OT Assessment  OT Assessment: Patient is a 59 year old male admitted with PNA of R lung. At baseline, patient is nonverbal, resides in a group home, where he is dependent for ADLs/IADLs. Patient is unable to follow commands. Patient appears to be at baseline level. Anticipate patient has assistance needed once he returns home. Patient demonstrates no acute skilled OT needs.  Prognosis: Fair  Barriers to Discharge: None (requires 24/7 assistance)  Evaluation/Treatment Tolerance: Other (Comment) (anxious, fearful, resistive. patient appears uncomfortable outside of \"typical\" environment)  End of Session Communication: Bedside nurse  End of Session Patient Position: Bed, 3 rail up, Alarm on  Plan:  No Skilled OT: At baseline function  OT Frequency: OT eval only  OT Discharge Recommendations: Other (Comment) (24/7 assistance and supervision)  OT Recommended Transfer Status:  (unable to assess)  OT - OK to Discharge: Yes    Subjective   Current Problem:  1. Pneumonia of right lung due to infectious organism, unspecified part of lung        2. Hypoxia          General:  General  Reason for Referral: decline in ADLs, PNA of R lung  Referred By: PATRICIA Mcneil  Past Medical History Relevant to Rehab: anxiety, down syndrome, GERD, cardiac pacemaker, syncope, CKD stage 3  Family/Caregiver Present: No  Co-Treatment: PT  Co-Treatment Reason: safety with OOB mobility  Prior to Session Communication: Bedside nurse  Patient Position Received: Bed, 3 rail up, Alarm on  Preferred Learning Style: verbal  General Comment: Patient is a 59 year old male who presented to the ED from his group home with c/o SOB. Patient is admitted with PNA of R lung. Patient is " nonverbal at baseline. He is cleared by nursing for therapy.  Precautions:  Medical Precautions: Fall precautions  Precautions Comment: non verbal at baseline    Vital Sign (Past 2hrs)        Date/Time Vitals Session Patient Position Pulse Resp SpO2 BP MAP (mmHg)    10/21/24 0735 --  --  61  19  94 %  85/51  63     10/21/24 0813 --  --  60  --  96 %  113/68  78                   Vital Signs Comment: 96% O2 on room air    Pain:  Pain Assessment  Pain Assessment: FLACC (Face, Legs, Activity, Cry, Consolability)  0-10 (Numeric) Pain Score: 0 - No pain    Objective   Cognition:  Orientation Level: Unable to assess (patient is non-verbal at baseline)  Cognition Comments: patient appears very anxious and fearful at times. resistive to movement. does not follow any commands.     Home Living:  Type of Home: Group home  Home Living Comments: patient is a poor historian and unable to provide any intake information. per EMR, patient resides at a group home where he is independent with all transfers, mobility without a device. Patient requires dependent assist for all ADLs/IADLs. supervision for feeding   Prior Function:  Level of [place]: Needs assistance with ADLs, Needs assistance with homemaking  Receives Help From: Other (Comment) (staff from group [place])  ADL Assistance: Needs assistance (dependent for all ADLs. supervision for feeding)  Homemaking Assistance: Needs assistance (group home staff manage?)  Ambulatory Assistance: Independent (per EMR, independent without a device?)  IADL History:  Homemaking Responsibilities: No  ADL:  Eating Assistance: Unable to assess  Eating Deficit:  (patient not following any commands. patient pending speech therapy evaluation)  Grooming Assistance: Total  Grooming Deficit:  (resistive to therapist combing hair)  Bathing Assistance: Total  UE Dressing Assistance: Total  LE Dressing Assistance: Total  LE Dressing Deficit: Don/doff R sock, Don/doff L sock  Toileting Assistance with  Device: Total  Toileting Deficit: Incontinent (dependent brief change and moise area)  Activity Tolerance:  Endurance: Other (Comment)  Activity Tolerance Comments: patient resistive to movement and OOB activity. fearful, anxious  Bed Mobility/Transfers: Bed Mobility  Bed Mobility: Yes  Bed Mobility 1  Bed Mobility 1: Supine to sitting  Level of Assistance 1: Dependent, +2  Bed Mobility Comments 1: for safety, patient unable to follow commands  Bed Mobility 2  Bed Mobility  2: Sitting to supine  Level of Assistance 2: Dependent, +2  Bed Mobility Comments 2: for safety, patient unable to follow commands    Transfers  Transfer: No (unable to safely complete, as patient appears anxious, fearful, and resistive)    Sitting Balance:  Static Sitting Balance  Static Sitting-Comment/Number of Minutes: patient demonstrates the ability to sit EOB, however, he is unable to communicate needs, appears fearful and anxious. very resistive to OOB mobility    IADL's:   Homemaking Responsibilities: No    Sensation:  Sensation Comment: unable to assess  Strength:  Strength Comments: B UE WFL ~ observed functionally  Coordination:  Movements are Fluid and Coordinated: No   Extremities: RUE   RUE : Within Functional Limits (observed functionally) and LUE   LUE: Within Functional Limits (observed functionally)    Outcome Measures: [facility] Daily Activity  Putting on and taking off regular lower body clothing: Total  Bathing (including washing, rinsing, drying): Total  Putting on and taking off regular upper body clothing: Total  Toileting, which includes using toilet, bedpan or urinal: Total  Taking care of personal grooming such as brushing teeth: Total  Eating Meals: Total  Daily Activity - Total Score: 6    Education Documentation  ADL Training, taught by Kelin Mckeon OT at 10/21/2024  9:19 AM.  Learner: Patient  Readiness: Nonacceptance  Method: Explanation, Demonstration  Response: No Evidence of Learning  Comment: patient non-verbal  at baseline. unable to follow any commands    ADL Training, taught by Kelin Mckeon OT at 10/21/2024  9:19 AM.  Learner: Patient  Readiness: Nonacceptance  Method: Explanation, Demonstration  Response: No Evidence of Learning  Comment: patient unable to understand at baseline    Education Comments  No comments found.      Goals: No acute OT needs identified. OT orders to be discontinued at this time.

## 2024-10-21 NOTE — PROGRESS NOTES
"Physical Therapy    Physical Therapy Evaluation    Patient Name: Cyril Hernandez \"Briana"  MRN: [id number]  Department: [facility] 4 ICU  Room: [address] [place], [place] [address]  Today's Date: 10/21/2024   Time Calculation  Start Time: 0814  Stop Time: 0838  Time Calculation (min): 24 min    Assessment/Plan   PT Assessment  Medical Staff Made Aware: Yes  End of Session Communication: Bedside nurse  End of Session Patient Position: Bed, 3 rail up, Alarm on      Assessment Comment: 60 y/o male with h/o down syndrome and from group home, was admitted with SOB and management of PNA. Pt required O2 upon arrival, now on Room air. Pt appears to be fearful of mobility and assist from staff; likely due to unfamiliar environment and care staff. Anticipate pt will return to group home and function at/near baseline with mobility and ADL tasks. Pt not appropriate for PT while in-house with no further PT needs.        IP OR SWING BED PT PLAN  Inpatient or Swing Bed: Inpatient  PT Plan  PT Plan: PT Eval only  PT Eval Only Reason: No acute PT needs identified  PT Frequency: PT eval only  PT Discharge Recommendations: Other (comment), 24 hr supervision due to cognition (return to group home)  PT Recommended Transfer Status: Assist x2    Subjective   General Visit Information:  General  Reason for Referral: impaired mobility; SOB  Past Medical History Relevant to Rehab: anxiety, down syndrome, GERD, cardiac pacemaker, syncope, CKD stage 3  Family/Caregiver Present: No  Co-Treatment: OT  Co-Treatment Reason: safety with OOB mobility  Prior to Session Communication: Bedside nurse  Patient Position Received: Bed, 3 rail up, Alarm on  Preferred Learning Style: verbal  General Comment: Patient is a 59 year old male who presented to the ED from his group home with c/o SOB. Patient is admitted with PNA of R lung. Patient is nonverbal at baseline. He is cleared by nursing for therapy.  Home Living:  Home Living  Type of Home: Group home  Home Living Comments: patient is a " poor historian and unable to provide intake information. per EMR, patient resides at a group home where he is independent with all transfers, mobility without a device. Patient requires depenedent assist for all ADLs/IADLs. supervision for feeding.  Prior Level of Function:  Prior Function Per Pt/Caregiver Report  Level of [place]: Needs assistance with ADLs, Needs assistance with homemaking  Receives Help From: Other (Comment) (staff from group home)  ADL Assistance: Needs assistance (dependent for all ADLs. supervision for feeding)  Homemaking Assistance: Needs assistance (group home staff manage?)  Ambulatory Assistance: Independent (per EMR, independent without a device?)  Prior Function Comments: unable to confirm information, all info per EMR  Precautions:  Precautions  Medical Precautions: Fall precautions  Precautions Comment: non verbal at baseline       Vital Signs Comment: 113/68 (78), HR 60, SpO2 96% on room air     Objective   Pain:  Pain Assessment  Pain Assessment: FLACC (Face, Legs, Activity, Cry, Consolability)  0-10 (Numeric) Pain Score: 0 - No pain (no observed signs of pain)  Cognition:  Cognition  Orientation Level: Unable to assess  Cognition Comments: Pt alert. Does not follow commands. Attempted to use sign language to communicate, however pt did not respond to this therapist. Appears anxious and fearful to mobility and movement.  Impulsive: Moderately    General Assessments:  General Observation  General Observation: baseline h/o down syndrome     Activity Tolerance  Activity Tolerance Comments: pt resistant to mobility    Sensation  Sensation Comment: unable to assess    Strength  Strength Comments: Purposeful and spontaneous movements observed B LEs. Pt able to resist therapist during mobility.  Coordination  Coordination Comment: shaking and tremors    Postural Control  Posture Comment: kyphotic posture    Static Sitting Balance  Static Sitting-Level of Assistance: Contact guard,  Minimum assistance  Static Sitting-Comment/Number of Minutes: assist primarily due to decreased cognitive status. Pt deomnstrated ability to sit without LOB or supervision, however appears fearful of staff members, therefore impulsively attempting to return to supine and resisting staff assist for mobility.       Functional Assessments:  Bed Mobility 1  Bed Mobility 1: Supine to sitting, Sitting to supine  Level of Assistance 1: Dependent  Bed Mobility Comments 1: HOB elevated. Dependent assist for safety, as pt resistive to mobility. Anticipate pt able to perform when in own environment  Bed Mobility 2  Bed Mobility  2: Rolling right, Rolling left  Level of Assistance 2: Dependent  Bed Mobility Comments 2: to allow for posterior hygiene and linen change.    Transfers  Transfer:  (Attempted to perform sit to stand and bed to recliner transfer, however pt resistive to assist and mobility. Kicking/extending LEs against therapist and resisting with UEs. Anticipate pt is fearful due to unfamiliar environment.)       Extremity/Trunk Assessments:  RLE   RLE : Within Functional Limits (observed)  LLE   LLE : Within Functional Limits (observed)  Outcome Measures:  [facility] Basic Mobility  Turning from your back to your side while in a flat bed without using bedrails: A lot  Moving from lying on your back to sitting on the side of a flat bed without using bedrails: A lot  Moving to and from bed to chair (including a wheelchair): A lot  Standing up from a chair using your arms (e.g. wheelchair or bedside chair): A lot  To walk in hospital room: Total  Climbing 3-5 steps with railing: Total  Basic Mobility - Total Score: 10    Encounter Problems       Encounter Problems (Active)       Pain - Adult              Education Documentation  No documentation found.  Education Comments  No comments found.

## 2024-10-21 NOTE — CARE PLAN
The patient's goals for the shift include UTD    The clinical goals for the shift include activity OOB     Over the shift, the patient did not make progress toward the following goals.     Barriers to progression include fear.    Recommendations to address these barriers include utilize recliner chair, assist with PT, anxiety medication as needed.

## 2024-10-21 NOTE — PROGRESS NOTES
Spiritual Care Visit    Clinical Encounter Type  Visited With: Patient and family together  Routine Visit: Introduction  Continue Visiting: Yes         Values/Beliefs  Spiritual Requests During Hospitalization: Elliott is a nice young man, who is learning disabled. hen I saw him today he had quite an appetite! His sister, Zoila, said that was her brother! I gave Elliott a blessing and will continue to do so. No sacraments.    Sacramental Encounters  Communion: Does not want communion  Communion Given Indicator: No  Sacrament of Sick-Anointing: Patient declined anointing                             Taxonomy  Intended Effects: Build relationship of care and support, Demonstrate caring and concern, Promote sense of peace    Gregory Aggarwal

## 2024-10-21 NOTE — PROGRESS NOTES
10/21/24 1456   Discharge Planning   Living Arrangements   (Group Home)   Support Systems Family members  (Group Home Staff)   Assistance Needed ADLs & IADLs - Supervision with feeding   Type of Residence correction   Care Facility Name [facility] [place] [facility] [employer]   Expected Discharge Disposition [place]  (Group Home)   Does the patient need discharge transport arranged? Yes   RoundTrip coordination needed? Yes   Has discharge transport been arranged? No     Zoila (Guardian) at bedside will provide transportation home.

## 2024-10-21 NOTE — PROGRESS NOTES
"Speech-Language Pathology    Inpatient Speech-Language Pathology Clinical Swallow Evaluation and Treatment Note       Patient Name: Cyril Hernandez \"Elliott\"  MRN: [id number]  : 1965  Today's Date: 10/21/24  Time Calculation  Start Time: 1042  Stop Time: 1117  Time Calculation (min): 35 min       Current Problem:  1. Pneumonia of right lung due to infectious organism, unspecified part of lung        2. Hypoxia            Past Medical History:  Past Medical History:   Diagnosis Date    Hypoxemia 2015    Hypoxia     HPI from ED Physician:  59-year-old male with past medical history significant for trisomy who presents to the emergency department for shortness of breath from his care home.  Patient is nonverbal at baseline.  Report received from EMS is that the patient has been having increased work of breathing for most of the day, this evening got worse around 8 PM, and that is why they were called.  They found him hypoxic at around 78 on room air, started him on nonrebreather and transported him to the emergency department.  Chart review shows this patient has a history of prior pacer placement, is on a statin and albuterol.       Reason for Referral:  Pt presented to ED from residential group home on 10/20/24 with difficulty breathing.  Pt admitted for hypoxia and eventually diagnosed with pneumonia.  Pt referred to Speech-Language Pathology services for Clinical Swallow Evaluation in order to assess current swallow skills and determine plan of treatment.        Relevant Imaging Results:  CXR 10/20/24--   LUNGS:  There is a consolidation at the right lung base. No significant  pleural effusion. No pneumothorax.  IMPRESSION:  Right basilar airspace disease, suspicious for pneumonia.  Radiographic follow-up to complete resolution is recommended.     Recommendations:  Risk for Aspiration: Yes   Solid Diet Recommendations : Soft & bite sized/chopped (IDDSI Level 6)   Liquid Diet Recommendations: Thin (IDDSI " "Level 0)   MBSS Recommended: Not at this time.  Discussed with pt's sister, who does not believe he would be able to participate within the parameters of testing.  Although MBSS would definitively assess pharyngeal phase, defer recommendation at this time.    Medication Intake Method: Whole with liquid or puree  Compensatory Swallow Strategies:   - Upright positioning as close to 90º as possible  - Small bites/sips  - Slow rate of intake  - Add moisture to dry solids  - 1:1 feeding assistance      Skilled dysphagia treatment is warranted at next level of care to continue skilled training initiated in this setting.    Assessment    Impression: Pt presents with impaired oral phase characterized by prolonged and at times insufficient mastication, but overall functional swallow with deviations from \"normal\" consistent with comorbidities.  Pt is nonverbal with limited capability to express needs.  Pt's sister present for assessment, who reported that pt has had recent history of choking episodes at his residential group home, thus the staff has been modifying his foods with chopper and/or .  Review of EMR revealed two other admissions within this year with RLL infiltrates.      Pt is on a regular diet in hospital setting at this time to allow his sister to make selections from menu of foods that she knows he will like and tolerate.  His sister has been cutting foods into small pieces for pt to eat.  Pt's sister describes that pt often presents with prolonged mastication of more dry textures, and eventually may spit them out, which was consistent with performance with food trials during assessment.     Prolonged mastication and reduced tolerance of regular foods at times is of concern; recommend soft and bite-sized diet to compensate for impairments in oral phase, as well as thin liquids.  SLP described foods available on this menu and pt's sister agreed to a trial of this consistency.  Her main concern is " limitations in selection.  SLP will consider change to Easy to Chew diet if selection of soft & bite sized foods is too limited for pt to have adequate nutrition.      Consistencies Trialed: Thin (IDDSI Level 0) - Cup, Soft & bite sized/chopped (IDDSI Level 6), Pureed/extremely thick (IDDSI Level 4), Regular (IDDSI Level 7)   Oral Motor: Impaired Function (Pt protrudes tongue and lower lip at rest.)  Dentition: Edentulous   Medical Staff Made Aware: Yes     Plan  SLP Plan: Skilled SLP   SLP Frequency: 3x per week   Duration: 3 weeks   Next Treatment Priority: Diet tolerance   Discussed POC: Patient   Discussed Risks/Benefits: Yes   Patient/Caregiver Agreeable: Yes     Further treatment in acute setting:  Skilled dysphagia treatment is warranted for further education and training on dysphagia management including instruction on oropharyngeal therapeutic exercises and/or compensatory swallowing strategies    Dysphagia Goals: (Established 10/21/24, projected discontinuation 2 weeks)    - Pt will safely swallow recommended diet without s/s aspiration for 90% of observed trials in order to maintain adequate nutrition and hydration.  - Caregivers will understand and utilize safe swallow strategies with 90% acc in order to reduce risk of aspiration and s/s dysphagia.    Subjective   Pt pleasant and cooperative, upright in bed for assessment.  Pt is nonverbal at baseline.  Pt's sister present to provide report of pt's history and current functioning.    General Visit Information:  Prior to Session Communication: Bedside nurse   Self feeding: Pt is dependent on assistance at this time    Objective  Clinical Swallow Evaluation completed consisting of patient/caregiver interview, oral motor assessment, and analysis of swallow abilities with PO trials (4 oz water via cup, puree, soft solids, regular solids.)    ORAL PHASE:    Pt is edentulous.  Oral mucosa were pink, moist, and free of obvious lesions.   Mastication of regular  "solids was prolonged.  Bolus formation,  A-P transport, and oral clearance were noted to be adequate once mastication complete.  Pt required verbal cues to \"chew it up\", provided by sister, as she observed that he may be attempting to spit out whole piece before chewing.  Mastication of soft solids was adeqaute.  Bolus formation,  A-P transport, and oral clearance were also adequate.    PHARYNGEAL PHASE:   No suspected delay in onset of timeliness of swallow.  Laryngeal movement was visualized with all trials, however adequacy of hyolaryngeal elevation/excursion cannot be determined at bedside.   No overt (immediate or delayed) s/s aspiration were demonstrated with any consistencies.  Vocal quality clear post all swallows.    Pt demonstrated protrusion of tongue and lower lip during cup sips of liquids, but did not exhibit oral spillage as a result.     Pain:  Pain Assessment  Pain Assessment: FLACC (Face, Legs, Activity, Cry, Consolability)  0-10 (Numeric) Pain Score: 0 - No pain     Oxygen Status:  Room air    Oral Motor Assessment:  Oral/Motor Assessment  Oral Hygiene: WFL  Dentition: Edentulous  Oral Motor: Impaired Function (Pt protrudes tongue and lower lip at rest.)    Treatment:  Total treatment time: 15 min  Pt & his sister participated in treatment on this date following initial evaluation. Treatment consisted of discussion of possibility of aspiration pneumonia based on location of infiltrates during current and past admissions.  SLP explained that MBSS/instrumental assessment is only definitive way to determine if aspiration is occurring.  Also discussed 3 pillars of aspiration pneumonia*, importance of good oral hygiene, and compensatory swallow strategies to maximize swallow safety.  Pt's sister expressed understanding and appreciation of information. Patient tolerated treatment well.     *Three Pillars of Aspiration Pneumonia is research showing that aspiration pneumonia only develops when three " factors are present: aspiration, compromised immune system, poor oral hygiene. If all three factors are not present, risk of aspiration developing into pneumonia is very minimal, meaning that risk of dehydration and malnutrition is greater than pneumonia. Please see research articles below for reference or contact SLP regarding any questions or concerns.*    Khadra K, Adam T, Chase K, Velia T, Alberto H, Neymar H. Relation between incidence of pneumonia and protective reflexes in post-stroke patients with oral or tube feeding. J Intern Med. 2000 Jan;247(1):39-42. doi: 10.1046/j.[id number].x. PMID: 49174534.  Mukesh FIELDS, Wayne A. Nosocomial lung infection and its diagnosis. Crit Care Med. 1984 Mar;12(3):191-9. doi: 10.1097/[id number]. PMID: 8823756.  Joss WALLIS. The relationship between infections and chronic respiratory diseases: an overview. Jerri Periodontol. 2001 Dec;6(1):66-70. doi: 10.1902/annals.2001.6.1.66. PMID: 03043348.  ABHISHEK Chávez (2005, March). Pneumonia: Factors Beyond Aspiration. Perspectives in Swallowing and Swallowing Disorders (Dysphagia), 14, 10-16.    ABHISHEK Chávez & JAREK Espana (2008, March). Oral Care and the Elderly. Perspectives in Swallowing and Swallowing Disorders (Dysphagia), 17, 19-26.    ABHISHEK Chávez (2012, March). Oral Care Across Ages: A Review. Perspectives in Swallowing and Swallowing Disorders (Dysphagia), 21, 3-8.  Tayo RRichi KIRKLAND. & ABHISHEK Chávez (2008, December). A Methodology for the Inclusion of Laboratory Assessment in the Evaluation of Dysphagia. Perspectives in Swallowing and Swallowing Disorders (Dysphagia), 17, 128-134.    Inpatient Education:   Individual(s) Educated: Patient, Sister  Verbal Education : Results, recommendations  Risk and Benefits Discussed with Patient/Caregiver/Other: yes  Patient/Caregiver Demonstrated Understanding: yes  Plan of Care Discussed and Agreed Upon: yes  Patient Response to Education: Patient/Caregiver  "Verbalized Understanding of Information    Communication with Medical Team:  SLP communicated with bedside nurse prior to evaluation to obtain clearance for patient participation.  Results of the clinical swallow evaluation were discussed verbally with nurse upon completion with verbal understanding noted.  SLP also informed bedside nurse Keri of \"trial\" with soft and bite-sized diet.  Nursing staff to send message to SLP via [employer] Secure Chat or [employer] if any issues with diet consistency.      "

## 2024-10-22 ENCOUNTER — PHARMACY VISIT (OUTPATIENT)
Dept: PHARMACY | Facility: CLINIC | Age: 59
End: 2024-10-22
Payer: COMMERCIAL

## 2024-10-22 VITALS
HEIGHT: 60 IN | TEMPERATURE: 97 F | DIASTOLIC BLOOD PRESSURE: 80 MMHG | OXYGEN SATURATION: 92 % | BODY MASS INDEX: 28.31 KG/M2 | SYSTOLIC BLOOD PRESSURE: 113 MMHG | HEART RATE: 64 BPM | RESPIRATION RATE: 26 BRPM | WEIGHT: 144.18 LBS

## 2024-10-22 LAB
ANION GAP SERPL CALCULATED.3IONS-SCNC: 10 MMOL/L (ref 10–20)
BUN SERPL-MCNC: 21 MG/DL (ref 6–23)
CALCIUM SERPL-MCNC: 8.7 MG/DL (ref 8.6–10.3)
CHLORIDE SERPL-SCNC: 110 MMOL/L (ref 98–107)
CO2 SERPL-SCNC: 23 MMOL/L (ref 21–32)
CREAT SERPL-MCNC: 1.47 MG/DL (ref 0.5–1.3)
EGFRCR SERPLBLD CKD-EPI 2021: 54 ML/MIN/1.73M*2
ERYTHROCYTE [DISTWIDTH] IN BLOOD BY AUTOMATED COUNT: 15.7 % (ref 11.5–14.5)
GLUCOSE SERPL-MCNC: 87 MG/DL (ref 74–99)
HCT VFR BLD AUTO: 38.8 % (ref 41–52)
HGB BLD-MCNC: 12.5 G/DL (ref 13.5–17.5)
MCH RBC QN AUTO: 30.5 PG (ref 26–34)
MCHC RBC AUTO-ENTMCNC: 32.2 G/DL (ref 32–36)
MCV RBC AUTO: 95 FL (ref 80–100)
NRBC BLD-RTO: 0 /100 WBCS (ref 0–0)
PLATELET # BLD AUTO: 153 X10*3/UL (ref 150–450)
POTASSIUM SERPL-SCNC: 3.9 MMOL/L (ref 3.5–5.3)
RBC # BLD AUTO: 4.1 X10*6/UL (ref 4.5–5.9)
SODIUM SERPL-SCNC: 139 MMOL/L (ref 136–145)
STAPHYLOCOCCUS SPEC CULT: ABNORMAL
WBC # BLD AUTO: 7.6 X10*3/UL (ref 4.4–11.3)

## 2024-10-22 PROCEDURE — 2500000004 HC RX 250 GENERAL PHARMACY W/ HCPCS (ALT 636 FOR OP/ED): Performed by: STUDENT IN AN ORGANIZED HEALTH CARE EDUCATION/TRAINING PROGRAM

## 2024-10-22 PROCEDURE — 85027 COMPLETE CBC AUTOMATED: CPT | Performed by: NURSE PRACTITIONER

## 2024-10-22 PROCEDURE — 2500000004 HC RX 250 GENERAL PHARMACY W/ HCPCS (ALT 636 FOR OP/ED): Performed by: NURSE PRACTITIONER

## 2024-10-22 PROCEDURE — 94760 N-INVAS EAR/PLS OXIMETRY 1: CPT

## 2024-10-22 PROCEDURE — 9420000001 HC RT PATIENT EDUCATION 5 MIN

## 2024-10-22 PROCEDURE — 99239 HOSP IP/OBS DSCHRG MGMT >30: CPT | Performed by: NURSE PRACTITIONER

## 2024-10-22 PROCEDURE — 2500000001 HC RX 250 WO HCPCS SELF ADMINISTERED DRUGS (ALT 637 FOR MEDICARE OP): Performed by: STUDENT IN AN ORGANIZED HEALTH CARE EDUCATION/TRAINING PROGRAM

## 2024-10-22 PROCEDURE — 99232 SBSQ HOSP IP/OBS MODERATE 35: CPT | Performed by: NURSE PRACTITIONER

## 2024-10-22 PROCEDURE — 92526 ORAL FUNCTION THERAPY: CPT | Mod: GN

## 2024-10-22 PROCEDURE — 36415 COLL VENOUS BLD VENIPUNCTURE: CPT | Performed by: NURSE PRACTITIONER

## 2024-10-22 PROCEDURE — 80048 BASIC METABOLIC PNL TOTAL CA: CPT | Performed by: NURSE PRACTITIONER

## 2024-10-22 PROCEDURE — RXMED WILLOW AMBULATORY MEDICATION CHARGE

## 2024-10-22 PROCEDURE — 94640 AIRWAY INHALATION TREATMENT: CPT

## 2024-10-22 PROCEDURE — 2500000002 HC RX 250 W HCPCS SELF ADMINISTERED DRUGS (ALT 637 FOR MEDICARE OP, ALT 636 FOR OP/ED): Performed by: STUDENT IN AN ORGANIZED HEALTH CARE EDUCATION/TRAINING PROGRAM

## 2024-10-22 RX ORDER — CEFUROXIME AXETIL 500 MG/1
500 TABLET ORAL 2 TIMES DAILY
Qty: 10 TABLET | Refills: 0 | Status: SHIPPED | OUTPATIENT
Start: 2024-10-22

## 2024-10-22 RX ORDER — AZITHROMYCIN 500 MG/1
500 TABLET, FILM COATED ORAL DAILY
Qty: 5 TABLET | Refills: 0 | Status: SHIPPED | OUTPATIENT
Start: 2024-10-22

## 2024-10-22 RX ORDER — HYDROXYZINE HYDROCHLORIDE 50 MG/1
100 TABLET, FILM COATED ORAL EVERY 6 HOURS PRN
Start: 2024-10-22

## 2024-10-22 RX ADMIN — PIPERACILLIN SODIUM AND TAZOBACTAM SODIUM 2.25 G: 2; .25 INJECTION, SOLUTION INTRAVENOUS at 07:08

## 2024-10-22 RX ADMIN — FAMOTIDINE 20 MG: 20 TABLET ORAL at 09:02

## 2024-10-22 RX ADMIN — LEVOTHYROXINE SODIUM 112 MCG: 0.11 TABLET ORAL at 07:08

## 2024-10-22 RX ADMIN — DEXTROSE MONOHYDRATE 500 MG: 50 INJECTION, SOLUTION INTRAVENOUS at 02:23

## 2024-10-22 RX ADMIN — IPRATROPIUM BROMIDE AND ALBUTEROL SULFATE 3 ML: 2.5; .5 SOLUTION RESPIRATORY (INHALATION) at 07:32

## 2024-10-22 RX ADMIN — POLYETHYLENE GLYCOL 3350 17 G: 17 POWDER, FOR SOLUTION ORAL at 09:02

## 2024-10-22 RX ADMIN — HEPARIN SODIUM 5000 UNITS: 5000 INJECTION, SOLUTION INTRAVENOUS; SUBCUTANEOUS at 09:02

## 2024-10-22 RX ADMIN — SENNOSIDES 17.2 MG: 8.6 TABLET, FILM COATED ORAL at 09:02

## 2024-10-22 ASSESSMENT — COGNITIVE AND FUNCTIONAL STATUS - GENERAL
MOVING FROM LYING ON BACK TO SITTING ON SIDE OF FLAT BED WITH BEDRAILS: A LOT
TOILETING: TOTAL
CLIMB 3 TO 5 STEPS WITH RAILING: TOTAL
MOBILITY SCORE: 11
WALKING IN HOSPITAL ROOM: A LOT
HELP NEEDED FOR BATHING: TOTAL
DAILY ACTIVITIY SCORE: 6
MOVING TO AND FROM BED TO CHAIR: A LOT
EATING MEALS: TOTAL
PERSONAL GROOMING: TOTAL
DRESSING REGULAR UPPER BODY CLOTHING: TOTAL
DRESSING REGULAR LOWER BODY CLOTHING: TOTAL
TURNING FROM BACK TO SIDE WHILE IN FLAT BAD: A LOT
STANDING UP FROM CHAIR USING ARMS: A LOT

## 2024-10-22 ASSESSMENT — PAIN SCALES - GENERAL
PAINLEVEL_OUTOF10: 0 - NO PAIN
PAINLEVEL_OUTOF10: 0 - NO PAIN

## 2024-10-22 ASSESSMENT — PAIN - FUNCTIONAL ASSESSMENT: PAIN_FUNCTIONAL_ASSESSMENT: FLACC (FACE, LEGS, ACTIVITY, CRY, CONSOLABILITY)

## 2024-10-22 ASSESSMENT — PAIN SCALES - WONG BAKER: WONGBAKER_NUMERICALRESPONSE: NO HURT

## 2024-10-22 NOTE — PROGRESS NOTES
Spiritual Care Visit    Clinical Encounter Type  Visited With: Patient and family together  Routine Visit: Follow-up  Continue Visiting: Yes         Values/Beliefs  Spiritual Requests During Hospitalization: I had a wonderful discussion with Elliott's sister, as he ate breakfast today. he got a blessing from his favorite !     Gregory Aggarwal

## 2024-10-22 NOTE — DISCHARGE SUMMARY
Discharge Diagnosis  Pneumonia of right lung due to infectious organism, unspecified part of lung    Issues Requiring Follow-Up  Pneumonia    Discharge Meds     Medication List      START taking these medications     azithromycin 500 mg tablet; Commonly known as: Zithromax; Take 1 tablet   (500 mg) by mouth once daily.   cefuroxime 500 mg tablet; Commonly known as: Ceftin; Take 1 tablet (500   mg) by mouth 2 times a day.     CHANGE how you take these medications     hydrOXYzine HCL 50 mg tablet; Commonly known as: Atarax; Take 2 tablets   (100 mg) by mouth every 6 hours if needed for anxiety.; What changed: when   to take this, additional instructions     CONTINUE taking these medications     acetaminophen 500 mg tablet; Commonly known as: Tylenol; GIVE 1 TABLET   BY MOUTH EVERY 6 HOURS AS NEEDED FOR HEADACHE (TAPPING CHIN), FEVER ABOVE   100F OR PAIN/LIMPING *SEE MAR*   * albuterol 2.5 mg /3 mL (0.083 %) nebulizer solution; Take 3 mL by   nebulization 3 times a day.   * albuterol 90 mcg/actuation inhaler; Commonly known as: Ventolin HFA;   Inhale 2 puffs every 4 hours if needed for wheezing.   alum-mag hydroxide-simeth 200-200-20 mg/5 mL oral suspension; Commonly   known as: Mylanta   atorvastatin 20 mg tablet; Commonly known as: Lipitor; Take 1 tablet (20   mg) by mouth once daily.   chlorhexidine 0.12 % solution; Commonly known as: Peridex; SWAB ONTO   TEETH AND GUMS TWICE DAILY FOR PREVENTION OF INFECTION *CALL PHARMACY FOR   REFILLS*   docusate sodium 250 mg capsule; Take 1 capsule (250 mg) by mouth once   daily.   ergocalciferol 1.25 MG (99397 UT) capsule; Commonly known as: Vitamin   D-2; Take 1 capsule (50,000 Units) by mouth 1 (one) time per week.   famotidine 20 mg tablet; Commonly known as: Pepcid; Take 1 tablet (20   mg) by mouth 2 times a day.   hydrocortisone 1 % cream   levothyroxine 112 mcg tablet; Commonly known as: Synthroid, Levoxyl;   Take 1 tablet (112 mcg) by mouth early in the morning.. Take  on an empty   stomach at the same time each day, either 30 to 60 minutes prior to   breakfast   loratadine 10 mg tablet; Commonly known as: Claritin; Take 1 tablet (10   mg) by mouth once daily.   polyethylene glycol 17 gram packet; Commonly known as: Glycolax,   Miralax; GIVE 1 POWDER PACK BY MOUTH ONCE DAILY *DISSOLVE IN 4-8 OZ   WATER/JUICE  * This list has 2 medication(s) that are the same as other medications   prescribed for you. Read the directions carefully, and ask your doctor or   other care provider to review them with you.       Test Results Pending At Discharge  Pending Labs       Order Current Status    Staphylococcus aureus/MRSA colonization, Culture In process            Hospital Course  Pt with MRDD and non-verbal, admitted for hypoxia secondary to pneumonia. Pt was treated with oxygen and IV Abx. Evaluated by speech therapy and no s/s of aspiration. Pt has remained off oxygen x 24 hrs. To complete oral course of antibiotics on discharge. Medically stable for discharge back to group home.     Pertinent Physical Exam At Time of Discharge  Physical Exam  Constitutional:       Comments: Non-verbal, MRDD   HENT:      Head: Normocephalic and atraumatic.      Nose: Nose normal.      Mouth/Throat:      Mouth: Mucous membranes are moist.      Pharynx: Oropharynx is clear.   Eyes:      Extraocular Movements: Extraocular movements intact.      Pupils: Pupils are equal, round, and reactive to light.   Cardiovascular:      Rate and Rhythm: Normal rate and regular rhythm.      Pulses: Normal pulses.   Pulmonary:      Effort: Pulmonary effort is normal.      Breath sounds: Normal breath sounds.   Abdominal:      General: Abdomen is flat. Bowel sounds are normal.      Palpations: Abdomen is soft.   Musculoskeletal:         General: Normal range of motion.   Skin:     General: Skin is warm and dry.      Capillary Refill: Capillary refill takes less than 2 seconds.   Neurological:      General: No focal deficit  present.      Mental Status: He is oriented to person, place, and time.   Psychiatric:         Mood and Affect: Mood normal.         Outpatient Follow-Up  Future Appointments   Date Time Provider Department Center   2/27/2025  9:00 AM [id number] LATONYA Perez, APRN-CNP

## 2024-10-22 NOTE — PROGRESS NOTES
"Cyril Hernandez \"Briana" is a 59 y.o. male on day 2 of admission seen in follow-up for acute hypoxic respiratory failure, pneumonia    Subjective   Family at bedside.  Nonverbal.  Resting comfortably.  On room air; O2 sats 92%.   Afebrile.       Objective     Physical Exam  Vitals and nursing note reviewed.   Constitutional:       Appearance: Normal appearance.   HENT:      Head: Normocephalic and atraumatic.      Nose: Nose normal.      Mouth/Throat:      Mouth: Mucous membranes are moist.   Eyes:      Extraocular Movements: Extraocular movements intact.      Conjunctiva/sclera: Conjunctivae normal.      Pupils: Pupils are equal, round, and reactive to light.   Cardiovascular:      Rate and Rhythm: Normal rate and regular rhythm.      Pulses: Normal pulses.      Heart sounds: Normal heart sounds.   Pulmonary:      Effort: Pulmonary effort is normal.      Breath sounds: Rhonchi present.      Comments: Coarse breath sounds bilaterally.  Abdominal:      General: Bowel sounds are normal.      Palpations: Abdomen is soft.   Musculoskeletal:         General: Normal range of motion.   Skin:     General: Skin is warm and dry.      Capillary Refill: Capillary refill takes less than 2 seconds.   Neurological:      General: No focal deficit present.      Mental Status: He is alert and oriented to person, place, and time.   Psychiatric:         Mood and Affect: Mood normal.         Behavior: Behavior normal.         Last Recorded Vitals  Blood pressure 113/80, pulse 64, temperature 36.1 °C (97 °F), temperature source Temporal, resp. rate 26, height 1.499 m (4' 11\"), weight 65.4 kg (144 lb 2.9 oz), SpO2 92%.  Intake/Output last 3 Shifts:  I/O last 3 completed shifts:  In: 1710 (26.1 mL/kg) [P.O.:960; IV Piggyback:750]  Out: - (0 mL/kg)   Weight: 65.4 kg       Intake/Output Summary (Last 24 hours) at 10/22/2024 0920  Last data filed at 10/22/2024 0819  Gross per 24 hour   Intake 930 ml   Output --   Net 930 ml      Scheduled " medications:  atorvastatin, 20 mg, oral, Nightly  azithromycin, 500 mg, intravenous, q24h  famotidine, 20 mg, oral, BID  heparin, 5,000 Units, subcutaneous, q12h  ipratropium-albuteroL, 3 mL, nebulization, TID  levothyroxine, 112 mcg, oral, Daily  piperacillin-tazobactam, 2.25 g, intravenous, q6h  polyethylene glycol, 17 g, oral, Daily  sennosides, 2 tablet, oral, BID     PRN medications: acetaminophen **OR** acetaminophen **OR** acetaminophen, hydrOXYzine HCL, ipratropium-albuteroL, oxygen         Relevant Results  Results for orders placed or performed during the hospital encounter of 10/20/24 (from the past 24 hours)   Basic metabolic panel   Result Value Ref Range    Glucose 87 74 - 99 mg/dL    Sodium 139 136 - 145 mmol/L    Potassium 3.9 3.5 - 5.3 mmol/L    Chloride 110 (H) 98 - 107 mmol/L    Bicarbonate 23 21 - 32 mmol/L    Anion Gap 10 10 - 20 mmol/L    Urea Nitrogen 21 6 - 23 mg/dL    Creatinine 1.47 (H) 0.50 - 1.30 mg/dL    eGFR 54 (L) >60 mL/min/1.73m*2    Calcium 8.7 8.6 - 10.3 mg/dL   CBC   Result Value Ref Range    WBC 7.6 4.4 - 11.3 x10*3/uL    nRBC 0.0 0.0 - 0.0 /100 WBCs    RBC 4.10 (L) 4.50 - 5.90 x10*6/uL    Hemoglobin 12.5 (L) 13.5 - 17.5 g/dL    Hematocrit 38.8 (L) 41.0 - 52.0 %    MCV 95 80 - 100 fL    MCH 30.5 26.0 - 34.0 pg    MCHC 32.2 32.0 - 36.0 g/dL    RDW 15.7 (H) 11.5 - 14.5 %    Platelets 153 150 - 450 x10*3/uL        XR chest 1 view  Result Date: 10/20/2024  FINDINGS:     CARDIOMEDIASTINAL SILHOUETTE: Cardiomediastinal silhouette is normal in size and configuration. Pacemaker leads overlie the right atrium and right ventricle.   LUNGS: There is a consolidation at the right lung base. No significant pleural effusion. No pneumothorax.   ABDOMEN: Gaseous distention of the stomach.   BONES: No acute osseous abnormality.   Right basilar airspace disease, suspicious for pneumonia. Radiographic follow-up to complete resolution is recommended.   Gastric distention.        Assessment/Plan    Acute hypoxic respiratory failure  Stable on room air  Nocturnal pulse oximetry tonight   Continuous pulse oximetry  Incentive spirometry/pulmonary hygiene    Pneumonia  Continue IV azithromycin, Zosyn  Follow-up cultures  Swallow evaluation per ST    Down syndrome  Nonverbal    Discharge planning-return to group home    Sarah Loving, APRN-CNP

## 2024-10-22 NOTE — PROGRESS NOTES
"Speech-Language Pathology    Inpatient Speech Language Pathology Dysphagia Treatment Note     Patient Name: Cyril Hernandez \"Elliott\"  MRN: [id number]  : 1965  Today's Date: 10/22/24  Time Calculation  Start Time: 915  Stop Time: 945  Time Calculation (min): 30 min       Total Number of Visits: 2/3 sw    PLAN:  Skilled dysphagia treatment continues to be warranted for pt/caregiver education in order to reduce risk of aspiration, dehydration and malnutrition.   Plan  Inpatient/Swing Bed or Outpatient: Inpatient  SLP Frequency: 3x per week  Duration: 3 weeks  SLP Discharge Recommendations: Home with no further SLP  Next Treatment Priority: diet tolerance  Discussed POC: Caregiver/family  Discussed Risks/Benefits: Yes  Patient/Caregiver Agreeable: Yes    Recommendations:   Solid Diet Recommendations: Soft & bite sized/chopped (IDDSI Level 6)  Liquid Diet Recommendations: Thin (IDDSI Level 0)  Medication Intake Method: Whole with liquid or puree  Compensatory Swallow Strategies:   - Upright positioning as close to 90º as possible  - Small bites/sips  - Slow rate of intake  - Add moisture to dry solids  - 1:1 feeding assistance    Subjective:  Pt seen at bedside for skilled dysphagia treatment.  Pt's sister was present feeding pt breakfast.  Pt was positioned upright in bed and was pleasant and cooperative.  Pain:  Pain Assessment  Pain Assessment: FLACC (Face, Legs, Activity, Cry, Consolability)  0-10 (Numeric) Pain Score: 0 - No pain       Oxygen Status:   room air      Objective:  Therapeutic Swallow Intervention : Compensatory Strategies, Caregiver Education  Solid Diet Recommendations: Soft & bite sized/chopped (IDDSI Level 6)  Liquid Diet Recommendations: Thin (IDDSI Level 0)     Dysphagia Goals: (Established 10/21/24, projected discontinuation 2 weeks)     - Pt will safely swallow recommended diet without s/s aspiration for 90% of observed trials in order to maintain adequate nutrition and hydration. " (Baseline 66% due to difficulty with some regular foods)  CURRENT STATUS: No s/s aspiration with 100% of observed trials; pt eating soft & bite-sized breakfast foods.  Pt's sister reported good tolerance of this consistency at other meals.   PROGRESS: Significant improvement with modified diet consistency    - Caregivers will understand and utilize safe swallow strategies with 90% acc in order to reduce risk of aspiration and s/s dysphagia.  CURRENT STATUS: Pt's sister agrees with diet modification and uses 5/5 recommended strategies consistently throughout meal.   PROGRESS: Significant improvement post education provided during initial eval and tx.      SLP Assessment:  Pt sitting upright, being fed breakfast by his sister who is satisfied with modified diet consistency and using compensatory swallow strategies appropriately.  Pt has deviations from typical swallowing, but demonstrates functional skills with compensations for overall functional swallowing.  Pt manages best with soft and bite-sized foods and thin liquids.  SLP reviewed pt's previous admissions this year for RLL infiltrates with his sister, stating that it is possible that these are related to episodes of aspiration.  She was able to relate them back to near-choking incidents.  SLP emphasized the importance of adherence to recommended diet modifications and  good oral hygiene to reduce risk of aspiration pneumonia.    SLP provided printed handout regarding preparation of IDDSI Level 6: Soft & bite sized/chopped foods for pt's sister to provide to caregivers at his group residence.  Contact information for this SLP also provided if further questions arise.  Pt is progressing as a result of current treatment interventions and would benefit from continued skilled dysphagia treatment in acute setting.    Treatment Outcome:  SLP TX Intervention Outcome: Making Progress Towards Goals    Treatment Tolerance: Patient tolerated treatment well   Prognosis:  Good   Barriers: Comorbidities   Medical Staff Made Aware: Yes     Inpatient Education:   Individual(s) Educated: Caregiver  Written Education : Handout for Soft and Bite-sized diet preparation  Verbal Education : Diet recommendations  Risk and Benefits Discussed with Patient/Caregiver/Other: yes  Patient/Caregiver Demonstrated Understanding: yes  Plan of Care Discussed and Agreed Upon: yes  Patient Response to Education: Patient/Caregiver Verbalized Understanding of Information    Communication with Medical Team: SLP discussed results/recommendations with pt's nurse Leiva on unit.

## 2024-10-22 NOTE — PROGRESS NOTES
10/22/24 0900   Discharge Planning   Home or Post Acute Services None   Expected Discharge Disposition Home  (Group Home - Owned by [facility], run by [facility]. [facility])   Does the patient need discharge transport arranged? No  ([facility] (Guardian) will provide transportation back to [facility].)

## 2024-10-22 NOTE — CARE PLAN
The patient's goals for the shift include discharge back to group home    The clinical goals for the shift include remain hemodynamically stable     Over the shift, the patient did  make progress toward the following goals. Barriers to progression include none.. Recommendations to address these barriers include proceed with discharge.

## 2024-10-23 ENCOUNTER — PATIENT OUTREACH (OUTPATIENT)
Dept: PRIMARY CARE | Facility: CLINIC | Age: 59
End: 2024-10-23
Payer: MEDICARE

## 2024-10-23 NOTE — PROGRESS NOTES
Discharge Facility: [facility]  Discharge Diagnosis: Pneumonia of right lung due to infectious organism, unspecified part of lung     Admission Date: 10/20/2024  Discharge Date: 10/22/2024    PCP Appointment Date: 10/24/2024  Specialist Appointment Date: Cardio 02/27/2025  Hospital Encounter and Summary Linked: Yes      Call deferred. Patient has appointment with PCP tomorrow 10/24/2024

## 2024-10-24 ENCOUNTER — OFFICE VISIT (OUTPATIENT)
Dept: PRIMARY CARE | Facility: CLINIC | Age: 59
End: 2024-10-24
Payer: MEDICARE

## 2024-10-24 VITALS
BODY MASS INDEX: 25.32 KG/M2 | OXYGEN SATURATION: 98 % | HEART RATE: 98 BPM | DIASTOLIC BLOOD PRESSURE: 70 MMHG | WEIGHT: 129 LBS | HEIGHT: 60 IN | SYSTOLIC BLOOD PRESSURE: 118 MMHG | RESPIRATION RATE: 19 BRPM | TEMPERATURE: 98.3 F

## 2024-10-24 DIAGNOSIS — J18.9 PNEUMONIA OF RIGHT LOWER LOBE DUE TO INFECTIOUS ORGANISM: Primary | ICD-10-CM

## 2024-10-24 LAB
ATRIAL RATE: 54 BPM
P AXIS: 218 DEGREES
P OFFSET: 175 MS
P ONSET: 155 MS
PR INTERVAL: 168 MS
Q ONSET: 217 MS
QRS COUNT: 10 BEATS
QRS DURATION: 116 MS
QT INTERVAL: 420 MS
QTC CALCULATION(BAZETT): 426 MS
QTC FREDERICIA: 424 MS
R AXIS: 11 DEGREES
T AXIS: -4 DEGREES
T OFFSET: 427 MS
VENTRICULAR RATE: 62 BPM

## 2024-10-24 PROCEDURE — 3074F SYST BP LT 130 MM HG: CPT | Performed by: FAMILY MEDICINE

## 2024-10-24 PROCEDURE — 99496 TRANSJ CARE MGMT HIGH F2F 7D: CPT | Performed by: FAMILY MEDICINE

## 2024-10-24 PROCEDURE — 3078F DIAST BP <80 MM HG: CPT | Performed by: FAMILY MEDICINE

## 2024-10-24 PROCEDURE — 1036F TOBACCO NON-USER: CPT | Performed by: FAMILY MEDICINE

## 2024-10-24 PROCEDURE — 3008F BODY MASS INDEX DOCD: CPT | Performed by: FAMILY MEDICINE

## 2024-10-24 RX ORDER — AZITHROMYCIN 500 MG/1
500 TABLET, FILM COATED ORAL DAILY
COMMUNITY

## 2024-10-24 ASSESSMENT — ENCOUNTER SYMPTOMS
OCCASIONAL FEELINGS OF UNSTEADINESS: 0
LOSS OF SENSATION IN FEET: 0

## 2024-10-24 ASSESSMENT — PATIENT HEALTH QUESTIONNAIRE - PHQ9
SUM OF ALL RESPONSES TO PHQ9 QUESTIONS 1 AND 2: 0
2. FEELING DOWN, DEPRESSED OR HOPELESS: NOT AT ALL
1. LITTLE INTEREST OR PLEASURE IN DOING THINGS: NOT AT ALL

## 2024-10-24 ASSESSMENT — PAIN SCALES - GENERAL: PAINLEVEL_OUTOF10: 0-NO PAIN

## 2024-11-01 ENCOUNTER — TELEPHONE (OUTPATIENT)
Dept: INTENSIVE CARE | Facility: HOSPITAL | Age: 59
End: 2024-11-01

## 2024-11-01 ENCOUNTER — PATIENT OUTREACH (OUTPATIENT)
Dept: PRIMARY CARE | Facility: CLINIC | Age: 59
End: 2024-11-01
Payer: MEDICARE

## 2024-11-07 ENCOUNTER — HOSPITAL ENCOUNTER (OUTPATIENT)
Dept: RADIOLOGY | Facility: CLINIC | Age: 59
Discharge: HOME | End: 2024-11-07
Payer: MEDICARE

## 2024-11-07 ENCOUNTER — CLINICAL SUPPORT (OUTPATIENT)
Dept: PRIMARY CARE | Facility: CLINIC | Age: 59
End: 2024-11-07
Payer: MEDICARE

## 2024-11-07 DIAGNOSIS — J18.9 PNEUMONIA OF RIGHT LOWER LOBE DUE TO INFECTIOUS ORGANISM: ICD-10-CM

## 2024-11-07 PROCEDURE — 71046 X-RAY EXAM CHEST 2 VIEWS: CPT

## 2024-11-11 ENCOUNTER — TELEPHONE (OUTPATIENT)
Dept: PRIMARY CARE | Facility: CLINIC | Age: 59
End: 2024-11-11
Payer: MEDICARE

## 2024-11-11 DIAGNOSIS — J18.9 PNEUMONIA OF RIGHT LOWER LOBE DUE TO INFECTIOUS ORGANISM: ICD-10-CM

## 2024-11-25 ENCOUNTER — PATIENT OUTREACH (OUTPATIENT)
Dept: PRIMARY CARE | Facility: CLINIC | Age: 59
End: 2024-11-25
Payer: MEDICARE

## 2024-11-25 NOTE — PROGRESS NOTES
Successful outreach [facility] regarding hospitalization as patient continues TCM program.   At time of outreach call the patient feels as if their condition has improved since initial visit with PCP or specialist.  Questions or concerns addressed at this time Zoila.   Provided contact information Zoila if any further non-emergent needs arise.

## 2024-12-02 ENCOUNTER — HOSPITAL ENCOUNTER (OUTPATIENT)
Dept: CARDIOLOGY | Facility: CLINIC | Age: 59
Discharge: HOME | End: 2024-12-02
Payer: COMMERCIAL

## 2024-12-02 DIAGNOSIS — Z95.0 CARDIAC PACEMAKER: ICD-10-CM

## 2024-12-02 DIAGNOSIS — I44.2 CHB (COMPLETE HEART BLOCK): ICD-10-CM

## 2024-12-02 PROCEDURE — 93294 REM INTERROG EVL PM/LDLS PM: CPT | Performed by: INTERNAL MEDICINE

## 2024-12-02 PROCEDURE — 93296 REM INTERROG EVL PM/IDS: CPT

## 2024-12-09 DIAGNOSIS — J42 CHRONIC BRONCHITIS, UNSPECIFIED CHRONIC BRONCHITIS TYPE (MULTI): ICD-10-CM

## 2024-12-10 LAB — NONINV COLON CA DNA+OCC BLD SCRN STL QL: NEGATIVE

## 2024-12-10 RX ORDER — ALBUTEROL SULFATE 0.83 MG/ML
SOLUTION RESPIRATORY (INHALATION)
Qty: 270 ML | Refills: 2 | Status: SHIPPED | OUTPATIENT
Start: 2024-12-10

## 2024-12-12 ENCOUNTER — HOSPITAL ENCOUNTER (OUTPATIENT)
Dept: RADIOLOGY | Facility: CLINIC | Age: 59
Discharge: HOME | End: 2024-12-12
Payer: MEDICARE

## 2024-12-12 ENCOUNTER — APPOINTMENT (OUTPATIENT)
Dept: PRIMARY CARE | Facility: CLINIC | Age: 59
End: 2024-12-12
Payer: MEDICARE

## 2024-12-12 DIAGNOSIS — J18.9 PNEUMONIA OF RIGHT LOWER LOBE DUE TO INFECTIOUS ORGANISM: ICD-10-CM

## 2024-12-12 PROCEDURE — 71046 X-RAY EXAM CHEST 2 VIEWS: CPT | Performed by: RADIOLOGY

## 2024-12-12 PROCEDURE — 71046 X-RAY EXAM CHEST 2 VIEWS: CPT

## 2024-12-26 DIAGNOSIS — E03.9 HYPOTHYROIDISM, UNSPECIFIED TYPE: ICD-10-CM

## 2024-12-26 DIAGNOSIS — F72 SEVERE INTELLECTUAL DISABILITY: ICD-10-CM

## 2024-12-26 DIAGNOSIS — K21.9 GASTROESOPHAGEAL REFLUX DISEASE, UNSPECIFIED WHETHER ESOPHAGITIS PRESENT: ICD-10-CM

## 2024-12-26 DIAGNOSIS — E78.5 HYPERLIPIDEMIA, UNSPECIFIED HYPERLIPIDEMIA TYPE: ICD-10-CM

## 2024-12-30 RX ORDER — DOCUSATE SODIUM 250 MG
CAPSULE ORAL
Qty: 90 CAPSULE | Refills: 3 | Status: SHIPPED | OUTPATIENT
Start: 2024-12-30

## 2024-12-30 RX ORDER — ERGOCALCIFEROL 1.25 MG/1
CAPSULE ORAL
Qty: 12 CAPSULE | Refills: 1 | Status: SHIPPED | OUTPATIENT
Start: 2024-12-30

## 2024-12-30 RX ORDER — LEVOTHYROXINE SODIUM 112 UG/1
TABLET ORAL
Qty: 90 TABLET | Refills: 3 | Status: SHIPPED | OUTPATIENT
Start: 2024-12-30

## 2024-12-30 RX ORDER — LORATADINE 10 MG/1
TABLET ORAL
Qty: 90 TABLET | Refills: 3 | Status: SHIPPED | OUTPATIENT
Start: 2024-12-30

## 2024-12-30 RX ORDER — ATORVASTATIN CALCIUM 20 MG/1
TABLET, FILM COATED ORAL
Qty: 90 TABLET | Refills: 3 | Status: SHIPPED | OUTPATIENT
Start: 2024-12-30

## 2025-01-06 ENCOUNTER — PATIENT OUTREACH (OUTPATIENT)
Dept: PRIMARY CARE | Facility: CLINIC | Age: 60
End: 2025-01-06
Payer: MEDICARE

## 2025-02-18 DIAGNOSIS — G44.209 ACUTE NON INTRACTABLE TENSION-TYPE HEADACHE: ICD-10-CM

## 2025-02-18 DIAGNOSIS — L85.3 DRY SKIN DERMATITIS: ICD-10-CM

## 2025-02-19 RX ORDER — DIMETHICONE/COLLOIDAL OATMEAL 1.25 %
1 LOTION (ML) TOPICAL
Qty: 355 ML | Refills: 3 | Status: SHIPPED | OUTPATIENT
Start: 2025-02-19 | End: 2025-02-20 | Stop reason: SDUPTHER

## 2025-02-19 RX ORDER — ACETAMINOPHEN 500 MG
500 TABLET ORAL EVERY 6 HOURS PRN
Qty: 30 TABLET | Refills: 2 | Status: SHIPPED | OUTPATIENT
Start: 2025-02-19 | End: 2025-02-20 | Stop reason: SDUPTHER

## 2025-02-20 DIAGNOSIS — G44.209 ACUTE NON INTRACTABLE TENSION-TYPE HEADACHE: ICD-10-CM

## 2025-02-20 DIAGNOSIS — L85.3 DRY SKIN DERMATITIS: ICD-10-CM

## 2025-02-20 RX ORDER — ACETAMINOPHEN 500 MG
500 TABLET ORAL EVERY 6 HOURS PRN
Qty: 30 TABLET | Refills: 2 | Status: SHIPPED | OUTPATIENT
Start: 2025-02-20

## 2025-02-20 RX ORDER — DIMETHICONE/COLLOIDAL OATMEAL 1.25 %
1 LOTION (ML) TOPICAL DAILY
Qty: 227 G | Refills: 3 | Status: SHIPPED | OUTPATIENT
Start: 2025-02-20

## 2025-02-27 ENCOUNTER — APPOINTMENT (OUTPATIENT)
Dept: CARDIOLOGY | Facility: CLINIC | Age: 60
End: 2025-02-27
Payer: MEDICARE

## 2025-03-04 DIAGNOSIS — Z95.0 CARDIAC PACEMAKER: Primary | ICD-10-CM

## 2025-03-04 DIAGNOSIS — I44.2 COMPLETE HEART BLOCK: ICD-10-CM

## 2025-03-06 ENCOUNTER — HOSPITAL ENCOUNTER (OUTPATIENT)
Dept: CARDIOLOGY | Facility: CLINIC | Age: 60
Discharge: HOME | End: 2025-03-06
Payer: MEDICARE

## 2025-03-06 DIAGNOSIS — Z95.0 CARDIAC PACEMAKER: ICD-10-CM

## 2025-03-06 DIAGNOSIS — I44.2 CHB (COMPLETE HEART BLOCK): ICD-10-CM

## 2025-03-06 DIAGNOSIS — F72 SEVERE INTELLECTUAL DISABILITY: ICD-10-CM

## 2025-03-06 PROCEDURE — 93280 PM DEVICE PROGR EVAL DUAL: CPT

## 2025-03-06 PROCEDURE — 93280 PM DEVICE PROGR EVAL DUAL: CPT | Performed by: INTERNAL MEDICINE

## 2025-03-07 RX ORDER — POLYETHYLENE GLYCOL 3350 17 G/17G
17 POWDER, FOR SOLUTION ORAL DAILY
Qty: 30 EACH | Refills: 10 | Status: SHIPPED | OUTPATIENT
Start: 2025-03-07

## 2025-03-08 ENCOUNTER — APPOINTMENT (OUTPATIENT)
Dept: RADIOLOGY | Facility: HOSPITAL | Age: 60
End: 2025-03-08
Payer: MEDICARE

## 2025-03-08 ENCOUNTER — APPOINTMENT (OUTPATIENT)
Dept: CARDIOLOGY | Facility: HOSPITAL | Age: 60
End: 2025-03-08
Payer: MEDICARE

## 2025-03-08 ENCOUNTER — HOSPITAL ENCOUNTER (EMERGENCY)
Facility: HOSPITAL | Age: 60
Discharge: HOME | End: 2025-03-08
Attending: EMERGENCY MEDICINE
Payer: MEDICARE

## 2025-03-08 VITALS
HEART RATE: 65 BPM | WEIGHT: 127.21 LBS | SYSTOLIC BLOOD PRESSURE: 116 MMHG | RESPIRATION RATE: 15 BRPM | HEIGHT: 60 IN | DIASTOLIC BLOOD PRESSURE: 66 MMHG | OXYGEN SATURATION: 97 % | TEMPERATURE: 97.9 F | BODY MASS INDEX: 24.97 KG/M2

## 2025-03-08 DIAGNOSIS — R55 SYNCOPE, UNSPECIFIED SYNCOPE TYPE: ICD-10-CM

## 2025-03-08 DIAGNOSIS — J69.0 ASPIRATION PNEUMONIA, UNSPECIFIED ASPIRATION PNEUMONIA TYPE, UNSPECIFIED LATERALITY, UNSPECIFIED PART OF LUNG (MULTI): Primary | ICD-10-CM

## 2025-03-08 LAB
ALBUMIN SERPL BCP-MCNC: 3.7 G/DL (ref 3.4–5)
ALP SERPL-CCNC: 59 U/L (ref 33–136)
ALT SERPL W P-5'-P-CCNC: 95 U/L (ref 10–52)
ANION GAP SERPL CALCULATED.3IONS-SCNC: 12 MMOL/L (ref 10–20)
AST SERPL W P-5'-P-CCNC: 39 U/L (ref 9–39)
BASOPHILS # BLD AUTO: 0.03 X10*3/UL (ref 0–0.1)
BASOPHILS NFR BLD AUTO: 0.4 %
BILIRUB SERPL-MCNC: 0.6 MG/DL (ref 0–1.2)
BUN SERPL-MCNC: 23 MG/DL (ref 6–23)
CALCIUM SERPL-MCNC: 9 MG/DL (ref 8.6–10.3)
CHLORIDE SERPL-SCNC: 106 MMOL/L (ref 98–107)
CO2 SERPL-SCNC: 24 MMOL/L (ref 21–32)
CREAT SERPL-MCNC: 1.3 MG/DL (ref 0.5–1.3)
EGFRCR SERPLBLD CKD-EPI 2021: 63 ML/MIN/1.73M*2
EOSINOPHIL # BLD AUTO: 0.01 X10*3/UL (ref 0–0.7)
EOSINOPHIL NFR BLD AUTO: 0.1 %
ERYTHROCYTE [DISTWIDTH] IN BLOOD BY AUTOMATED COUNT: 15 % (ref 11.5–14.5)
FLUAV RNA RESP QL NAA+PROBE: NOT DETECTED
FLUBV RNA RESP QL NAA+PROBE: NOT DETECTED
GLUCOSE SERPL-MCNC: 86 MG/DL (ref 74–99)
HCT VFR BLD AUTO: 46.5 % (ref 41–52)
HGB BLD-MCNC: 15.8 G/DL (ref 13.5–17.5)
IMM GRANULOCYTES # BLD AUTO: 0.02 X10*3/UL (ref 0–0.7)
IMM GRANULOCYTES NFR BLD AUTO: 0.2 % (ref 0–0.9)
LYMPHOCYTES # BLD AUTO: 0.65 X10*3/UL (ref 1.2–4.8)
LYMPHOCYTES NFR BLD AUTO: 7.9 %
MCH RBC QN AUTO: 31.5 PG (ref 26–34)
MCHC RBC AUTO-ENTMCNC: 34 G/DL (ref 32–36)
MCV RBC AUTO: 93 FL (ref 80–100)
MONOCYTES # BLD AUTO: 0.15 X10*3/UL (ref 0.1–1)
MONOCYTES NFR BLD AUTO: 1.8 %
NEUTROPHILS # BLD AUTO: 7.39 X10*3/UL (ref 1.2–7.7)
NEUTROPHILS NFR BLD AUTO: 89.6 %
NRBC BLD-RTO: 0 /100 WBCS (ref 0–0)
PLATELET # BLD AUTO: 178 X10*3/UL (ref 150–450)
POTASSIUM SERPL-SCNC: 4.3 MMOL/L (ref 3.5–5.3)
PROT SERPL-MCNC: 6.9 G/DL (ref 6.4–8.2)
RBC # BLD AUTO: 5.01 X10*6/UL (ref 4.5–5.9)
RSV RNA RESP QL NAA+PROBE: NOT DETECTED
SARS-COV-2 RNA RESP QL NAA+PROBE: NOT DETECTED
SODIUM SERPL-SCNC: 138 MMOL/L (ref 136–145)
WBC # BLD AUTO: 8.3 X10*3/UL (ref 4.4–11.3)

## 2025-03-08 PROCEDURE — 71046 X-RAY EXAM CHEST 2 VIEWS: CPT | Performed by: RADIOLOGY

## 2025-03-08 PROCEDURE — 2500000004 HC RX 250 GENERAL PHARMACY W/ HCPCS (ALT 636 FOR OP/ED): Performed by: EMERGENCY MEDICINE

## 2025-03-08 PROCEDURE — 99285 EMERGENCY DEPT VISIT HI MDM: CPT | Mod: 25 | Performed by: EMERGENCY MEDICINE

## 2025-03-08 PROCEDURE — 85025 COMPLETE CBC W/AUTO DIFF WBC: CPT | Performed by: EMERGENCY MEDICINE

## 2025-03-08 PROCEDURE — 87637 SARSCOV2&INF A&B&RSV AMP PRB: CPT | Performed by: PHYSICIAN ASSISTANT

## 2025-03-08 PROCEDURE — 2500000001 HC RX 250 WO HCPCS SELF ADMINISTERED DRUGS (ALT 637 FOR MEDICARE OP): Performed by: PHYSICIAN ASSISTANT

## 2025-03-08 PROCEDURE — 80053 COMPREHEN METABOLIC PANEL: CPT | Performed by: EMERGENCY MEDICINE

## 2025-03-08 PROCEDURE — 36415 COLL VENOUS BLD VENIPUNCTURE: CPT | Performed by: EMERGENCY MEDICINE

## 2025-03-08 PROCEDURE — 93005 ELECTROCARDIOGRAM TRACING: CPT

## 2025-03-08 PROCEDURE — 71046 X-RAY EXAM CHEST 2 VIEWS: CPT

## 2025-03-08 RX ORDER — LEVOFLOXACIN 750 MG/1
750 TABLET ORAL ONCE
Status: COMPLETED | OUTPATIENT
Start: 2025-03-08 | End: 2025-03-08

## 2025-03-08 RX ORDER — LEVOFLOXACIN 500 MG/1
500 TABLET, FILM COATED ORAL DAILY
Qty: 10 TABLET | Refills: 0 | Status: SHIPPED | OUTPATIENT
Start: 2025-03-08 | End: 2025-03-09

## 2025-03-08 RX ADMIN — LEVOFLOXACIN 750 MG: 750 TABLET, FILM COATED ORAL at 19:37

## 2025-03-08 RX ADMIN — SODIUM CHLORIDE 1000 ML: 900 INJECTION, SOLUTION INTRAVENOUS at 22:14

## 2025-03-08 ASSESSMENT — PAIN - FUNCTIONAL ASSESSMENT: PAIN_FUNCTIONAL_ASSESSMENT: UNABLE TO SELF-REPORT

## 2025-03-09 RX ORDER — LEVOFLOXACIN 500 MG/1
500 TABLET, FILM COATED ORAL DAILY
Qty: 10 TABLET | Refills: 0 | Status: SHIPPED | OUTPATIENT
Start: 2025-03-09 | End: 2025-03-19

## 2025-03-09 NOTE — ED NOTES
Patient appeared to have syncopal episode with decreased BP and O2 saturation for approximately 1 minute. Patient was placed on 5L O2 and fluids began.      Rex Torres RN  03/08/25 4403

## 2025-03-09 NOTE — ED PROVIDER NOTES
HPI   Chief Complaint   Patient presents with    Shortness of Breath    Flu Symptoms     Pt brought in by EMS due to cough and congestion with aspiration risk       59-year-old male presented emergency department with a chief complaint of cough.  History of MRDD, nonverbal.  History of aspiration.  Not hypoxic well-appearing nontoxic in no distress.  Afebrile.  History limited secondary to patient's lack of verbal communication.              Patient History   Past Medical History:   Diagnosis Date    Hypoxemia 04/16/2015    Hypoxia     No past surgical history on file.  Family History   Problem Relation Name Age of Onset    No Known Problems Mother      No Known Problems Father       Social History     Tobacco Use    Smoking status: Never    Smokeless tobacco: Never   Vaping Use    Vaping status: Never Used   Substance Use Topics    Alcohol use: Never    Drug use: Never       Physical Exam   ED Triage Vitals [03/08/25 1813]   Temperature Heart Rate Respirations BP   37.2 °C (99 °F) 65 16 97/74      Pulse Ox Temp Source Heart Rate Source Patient Position   95 % Temporal -- --      BP Location FiO2 (%)     -- --       Physical Exam  Vitals and nursing note reviewed.   Constitutional:       Appearance: He is well-developed.   HENT:      Head: Normocephalic.   Cardiovascular:      Rate and Rhythm: Normal rate and regular rhythm.   Pulmonary:      Comments: Diminished at bases  Musculoskeletal:         General: Normal range of motion.      Cervical back: Normal range of motion.   Skin:     General: Skin is warm.   Neurological:      General: No focal deficit present.      Mental Status: He is alert and oriented to person, place, and time.   Psychiatric:         Mood and Affect: Mood normal.           ED Course & MDM   Diagnoses as of 03/08/25 2034   Aspiration pneumonia, unspecified aspiration pneumonia type, unspecified laterality, unspecified part of lung (Multi)                 No data recorded                                  Medical Decision Making  I have seen and evaluated this patient.  The attending physician has also seen and evaluated this patient.  Vital signs, laboratory testing and diagnostic images if applicable have been reviewed.  All laboratory and imaging is interpreted by myself unless otherwise stated.  Radiology studies are also formally interpreted by radiologist.     X-ray skin consistent with early mild pneumonia.  Patient without oxygen requirement or vital sign abnormality.  Treated with antibiotic.  Released in stable condition.    Labs Reviewed  SARS-COV-2 AND INFLUENZA A/B PCR - Normal     Flu A Result                                         Flu B Result                                         Coronavirus 2019, PCR                                  Narrative: This assay is an FDA-cleared, in vitro diagnostic nucleic acid amplification test for the qualitative detection and differentiation of SARS CoV-2/ Influenza A/B from nasopharyngeal specimens collected from individuals with signs and symptoms of respiratory tract infections, and has been validated for use at [facility]. Negative results do not preclude COVID-19/ Influenza A/B infections and should not be used as the sole basis for diagnosis, treatment, or other management decisions. Testing for SARS CoV-2 is recommended only for patients who meet current clinical and/or epidemiological criteria defined by federal, state, or local public health directives.  RSV PCR - Normal  XR chest 2 views   Final Result    Subtle right infrahilar infiltrate or atelectasis.          MACRO:    None          Signed by: Jr Bhatti 3/8/2025 7:18 PM    Dictation workstation:   [id number]     Medications  levoFLOXacin (Levaquin) tablet 750 mg (750 mg oral Given 3/8/25 1937)  Current Discharge Medication List    START taking these medications    levoFLOXacin (Levaquin) 500 mg tablet  Take 1 tablet (500 mg) by mouth once daily for 10  days.  Qty: 10 tablet Refills: 0  Associated Diagnoses:Aspiration pneumonia, unspecified aspiration pneumonia type, unspecified laterality, unspecified part of lung (Multi)                  Procedure  Procedures     Wade Delgadillo PA-C  03/08/25 2034       Wade Delgadillo PA-C  03/08/25 2034

## 2025-03-09 NOTE — DISCHARGE INSTRUCTIONS
Thank you for choosing [facility]. It was my pleasure to be involved in your care today.         As of today's visit, based on reasonable likelihood, that it is safe for you to be discharged back to your residence to follow-up as an outpatient for ongoing management of your medical problem. You should follow-up with any referrals / primary provider as soon as possible. The contacts (number, addresses) are listed below.         Important:  Even though we think it is safe for you to go home, there is always a small chance that we are missing something that could require hospitalization.  Therefore it is very important that if you get worse or develops any new symptoms that you return here as soon as possible to be re-evaluated.  This includes return of symptoms that have resolved such as fainting, chest pain, or symptoms that could be warning signs for stroke important:  Even though we think it is safe for you to go home, there is always a small chance that we are missing something that could require hospitalization.  Therefore it is very important that if you get worse or develops any new symptoms that you return here as soon as possible to be re-evaluated.  This includes return of symptoms that have resolved such as fainting, chest pain, or symptoms that could be warning signs for stroke         Make sure your pharmacy and primary doctor is aware of any new medications prescribed today.          It is your responsibility to contact as soon as possible, and follow through with, any referrals you were given today. We do recommend you inform them you are a [facility] follow-up patient, as often they can better accommodate your need to be seen, provided their schedules allow. We will, and have, made every effort to ensure you have access to adequate follow-up specialists available.          All problems may not be able to be fixed in one ER visit. This is why timely ongoing care is important, and this  is a responsibility you share in. Further, you are free to follow up with any provider you choose, and this is not limited to our suggestion.          If cultures were obtained today, you will be contacted should anything result that would require further treatment. Please contact the ED at the number provided with questions.          Having trouble affording medications? Try [employer]! (This is not a hospital endorsed website, merely a recommendation based on my own personal experiences with [employer])

## 2025-03-10 LAB
ATRIAL RATE: 144 BPM
P AXIS: -65 DEGREES
P OFFSET: 192 MS
P ONSET: 165 MS
PR INTERVAL: 100 MS
Q ONSET: 215 MS
QRS COUNT: 25 BEATS
QRS DURATION: 124 MS
QT INTERVAL: 298 MS
QTC CALCULATION(BAZETT): 486 MS
QTC FREDERICIA: 413 MS
R AXIS: -14 DEGREES
T AXIS: 11 DEGREES
T OFFSET: 364 MS
VENTRICULAR RATE: 160 BPM

## 2025-03-17 ENCOUNTER — HOSPITAL ENCOUNTER (EMERGENCY)
Facility: HOSPITAL | Age: 60
Discharge: ANOTHER HOSPITAL | End: 2025-03-18
Attending: EMERGENCY MEDICINE
Payer: MEDICARE

## 2025-03-17 ENCOUNTER — APPOINTMENT (OUTPATIENT)
Dept: CARDIOLOGY | Facility: HOSPITAL | Age: 60
End: 2025-03-17
Payer: MEDICARE

## 2025-03-17 ENCOUNTER — APPOINTMENT (OUTPATIENT)
Dept: RADIOLOGY | Facility: HOSPITAL | Age: 60
End: 2025-03-17
Payer: MEDICARE

## 2025-03-17 DIAGNOSIS — K62.89 PROCTITIS: ICD-10-CM

## 2025-03-17 DIAGNOSIS — K56.600 PARTIAL INTESTINAL OBSTRUCTION, UNSPECIFIED CAUSE (MULTI): ICD-10-CM

## 2025-03-17 DIAGNOSIS — K56.2 CECAL VOLVULUS (MULTI): Primary | ICD-10-CM

## 2025-03-17 LAB
ALBUMIN SERPL BCP-MCNC: 3.6 G/DL (ref 3.4–5)
ALP SERPL-CCNC: 64 U/L (ref 33–136)
ALT SERPL W P-5'-P-CCNC: 29 U/L (ref 10–52)
ANION GAP SERPL CALC-SCNC: 12 MMOL/L (ref 10–20)
APPEARANCE UR: CLEAR
AST SERPL W P-5'-P-CCNC: 21 U/L (ref 9–39)
BASOPHILS # BLD AUTO: 0.06 X10*3/UL (ref 0–0.1)
BASOPHILS NFR BLD AUTO: 1.7 %
BILIRUB SERPL-MCNC: 0.5 MG/DL (ref 0–1.2)
BILIRUB UR STRIP.AUTO-MCNC: NEGATIVE MG/DL
BUN SERPL-MCNC: 21 MG/DL (ref 6–23)
CALCIUM SERPL-MCNC: 9.1 MG/DL (ref 8.6–10.3)
CARDIAC TROPONIN I PNL SERPL HS: 3 NG/L (ref 0–20)
CARDIAC TROPONIN I PNL SERPL HS: 3 NG/L (ref 0–20)
CHLORIDE SERPL-SCNC: 105 MMOL/L (ref 98–107)
CO2 SERPL-SCNC: 29 MMOL/L (ref 21–32)
COLOR UR: ABNORMAL
CREAT SERPL-MCNC: 1.39 MG/DL (ref 0.5–1.3)
EGFRCR SERPLBLD CKD-EPI 2021: 58 ML/MIN/1.73M*2
EOSINOPHIL # BLD AUTO: 0.04 X10*3/UL (ref 0–0.7)
EOSINOPHIL NFR BLD AUTO: 1.1 %
ERYTHROCYTE [DISTWIDTH] IN BLOOD BY AUTOMATED COUNT: 15 % (ref 11.5–14.5)
FLUAV RNA RESP QL NAA+PROBE: NOT DETECTED
FLUBV RNA RESP QL NAA+PROBE: NOT DETECTED
GLUCOSE SERPL-MCNC: 91 MG/DL (ref 74–99)
GLUCOSE UR STRIP.AUTO-MCNC: NORMAL MG/DL
HCT VFR BLD AUTO: 45.5 % (ref 41–52)
HGB BLD-MCNC: 15.1 G/DL (ref 13.5–17.5)
IMM GRANULOCYTES # BLD AUTO: 0.1 X10*3/UL (ref 0–0.7)
IMM GRANULOCYTES NFR BLD AUTO: 2.8 % (ref 0–0.9)
KETONES UR STRIP.AUTO-MCNC: NEGATIVE MG/DL
LACTATE SERPL-SCNC: 0.7 MMOL/L (ref 0.4–2)
LEUKOCYTE ESTERASE UR QL STRIP.AUTO: NEGATIVE
LYMPHOCYTES # BLD AUTO: 0.66 X10*3/UL (ref 1.2–4.8)
LYMPHOCYTES NFR BLD AUTO: 18.2 %
MCH RBC QN AUTO: 32.1 PG (ref 26–34)
MCHC RBC AUTO-ENTMCNC: 33.2 G/DL (ref 32–36)
MCV RBC AUTO: 97 FL (ref 80–100)
MONOCYTES # BLD AUTO: 0.62 X10*3/UL (ref 0.1–1)
MONOCYTES NFR BLD AUTO: 17.1 %
MUCOUS THREADS #/AREA URNS AUTO: ABNORMAL /LPF
NEUTROPHILS # BLD AUTO: 2.14 X10*3/UL (ref 1.2–7.7)
NEUTROPHILS NFR BLD AUTO: 59.1 %
NITRITE UR QL STRIP.AUTO: NEGATIVE
NRBC BLD-RTO: 0 /100 WBCS (ref 0–0)
PH UR STRIP.AUTO: 6.5 [PH]
PLATELET # BLD AUTO: 185 X10*3/UL (ref 150–450)
POTASSIUM SERPL-SCNC: 4.4 MMOL/L (ref 3.5–5.3)
PROT SERPL-MCNC: 6.8 G/DL (ref 6.4–8.2)
PROT UR STRIP.AUTO-MCNC: NEGATIVE MG/DL
RBC # BLD AUTO: 4.7 X10*6/UL (ref 4.5–5.9)
RBC # UR STRIP.AUTO: ABNORMAL MG/DL
RBC #/AREA URNS AUTO: ABNORMAL /HPF
SARS-COV-2 RNA RESP QL NAA+PROBE: NOT DETECTED
SODIUM SERPL-SCNC: 142 MMOL/L (ref 136–145)
SP GR UR STRIP.AUTO: 1.01
UROBILINOGEN UR STRIP.AUTO-MCNC: NORMAL MG/DL
WBC # BLD AUTO: 3.6 X10*3/UL (ref 4.4–11.3)
WBC #/AREA URNS AUTO: ABNORMAL /HPF

## 2025-03-17 PROCEDURE — 87040 BLOOD CULTURE FOR BACTERIA: CPT | Mod: GENLAB | Performed by: PHYSICIAN ASSISTANT

## 2025-03-17 PROCEDURE — 2550000001 HC RX 255 CONTRASTS: Performed by: EMERGENCY MEDICINE

## 2025-03-17 PROCEDURE — 80053 COMPREHEN METABOLIC PANEL: CPT | Performed by: PHYSICIAN ASSISTANT

## 2025-03-17 PROCEDURE — 99285 EMERGENCY DEPT VISIT HI MDM: CPT | Mod: 25 | Performed by: EMERGENCY MEDICINE

## 2025-03-17 PROCEDURE — 81001 URINALYSIS AUTO W/SCOPE: CPT | Performed by: PHYSICIAN ASSISTANT

## 2025-03-17 PROCEDURE — 87636 SARSCOV2 & INF A&B AMP PRB: CPT | Performed by: PHYSICIAN ASSISTANT

## 2025-03-17 PROCEDURE — 96367 TX/PROPH/DG ADDL SEQ IV INF: CPT

## 2025-03-17 PROCEDURE — 71260 CT THORAX DX C+: CPT

## 2025-03-17 PROCEDURE — 84439 ASSAY OF FREE THYROXINE: CPT | Performed by: SURGERY

## 2025-03-17 PROCEDURE — 93005 ELECTROCARDIOGRAM TRACING: CPT

## 2025-03-17 PROCEDURE — 2500000004 HC RX 250 GENERAL PHARMACY W/ HCPCS (ALT 636 FOR OP/ED): Performed by: PHYSICIAN ASSISTANT

## 2025-03-17 PROCEDURE — 85025 COMPLETE CBC W/AUTO DIFF WBC: CPT | Performed by: PHYSICIAN ASSISTANT

## 2025-03-17 PROCEDURE — 84484 ASSAY OF TROPONIN QUANT: CPT | Performed by: PHYSICIAN ASSISTANT

## 2025-03-17 PROCEDURE — 36415 COLL VENOUS BLD VENIPUNCTURE: CPT | Performed by: PHYSICIAN ASSISTANT

## 2025-03-17 PROCEDURE — 2500000004 HC RX 250 GENERAL PHARMACY W/ HCPCS (ALT 636 FOR OP/ED): Performed by: EMERGENCY MEDICINE

## 2025-03-17 PROCEDURE — 71045 X-RAY EXAM CHEST 1 VIEW: CPT | Performed by: RADIOLOGY

## 2025-03-17 PROCEDURE — 96365 THER/PROPH/DIAG IV INF INIT: CPT | Mod: 59

## 2025-03-17 PROCEDURE — 71045 X-RAY EXAM CHEST 1 VIEW: CPT

## 2025-03-17 PROCEDURE — 83605 ASSAY OF LACTIC ACID: CPT | Performed by: PHYSICIAN ASSISTANT

## 2025-03-17 PROCEDURE — 96375 TX/PRO/DX INJ NEW DRUG ADDON: CPT | Mod: 59

## 2025-03-17 PROCEDURE — 84443 ASSAY THYROID STIM HORMONE: CPT | Performed by: SURGERY

## 2025-03-17 PROCEDURE — 96361 HYDRATE IV INFUSION ADD-ON: CPT

## 2025-03-17 RX ORDER — MIDAZOLAM HYDROCHLORIDE 1 MG/ML
1 INJECTION INTRAMUSCULAR; INTRAVENOUS ONCE
Status: COMPLETED | OUTPATIENT
Start: 2025-03-17 | End: 2025-03-17

## 2025-03-17 RX ORDER — AZITHROMYCIN MONOHYDRATE 500 MG/5ML
INJECTION, POWDER, LYOPHILIZED, FOR SOLUTION INTRAVENOUS
Status: COMPLETED
Start: 2025-03-17 | End: 2025-03-17

## 2025-03-17 RX ORDER — SODIUM CHLORIDE 9 MG/ML
125 INJECTION, SOLUTION INTRAVENOUS CONTINUOUS
Status: DISCONTINUED | OUTPATIENT
Start: 2025-03-17 | End: 2025-03-18 | Stop reason: HOSPADM

## 2025-03-17 RX ADMIN — SODIUM CHLORIDE 500 ML: 0.9 INJECTION, SOLUTION INTRAVENOUS at 23:21

## 2025-03-17 RX ADMIN — IOHEXOL 75 ML: 350 INJECTION, SOLUTION INTRAVENOUS at 19:38

## 2025-03-17 RX ADMIN — AZITHROMYCIN MONOHYDRATE 500 MG: 500 INJECTION, POWDER, LYOPHILIZED, FOR SOLUTION INTRAVENOUS at 21:44

## 2025-03-17 RX ADMIN — MIDAZOLAM HYDROCHLORIDE 1 MG: 1 INJECTION, SOLUTION INTRAMUSCULAR; INTRAVENOUS at 19:30

## 2025-03-17 RX ADMIN — PIPERACILLIN SODIUM AND TAZOBACTAM SODIUM 4.5 G: 4; .5 INJECTION, SOLUTION INTRAVENOUS at 21:06

## 2025-03-17 RX ADMIN — SODIUM CHLORIDE 500 ML: 900 INJECTION, SOLUTION INTRAVENOUS at 15:04

## 2025-03-17 RX ADMIN — SODIUM CHLORIDE 1000 ML: 0.9 INJECTION, SOLUTION INTRAVENOUS at 18:34

## 2025-03-17 ASSESSMENT — PAIN - FUNCTIONAL ASSESSMENT: PAIN_FUNCTIONAL_ASSESSMENT: UNABLE TO SELF-REPORT

## 2025-03-17 NOTE — ED TRIAGE NOTES
Patient sent from facility after they stated he went to stand up and fell back down on his butt. They checked his blood pressure at that time and it was low they stated. He is nonverbal

## 2025-03-17 NOTE — ED PROVIDER NOTES
HPI   No chief complaint on file.      Elliott Hernandez is a 59 y.o. male from [facility] living in [facility] with a known past medical history of Down Syndrome, Non verbal at baseline, enlarged tongue, HTN, cardiac pacemaker, presenting with low blood pressure after standing up and falling toward the ground staff was with the patient he did not hit his head per the staff    Information obtained from old records.  Patient was recently seen at [facility] on 3/8/2025 , for aspiration pneumonia.  Here he is not having any cough no shortness of breath no fevers or chills per the nursing facility    In his normal state of health    Blood pressure here is 115/86 blood pressure per EMS was 110-115's /60s                Patient History   Past Medical History:   Diagnosis Date    Hypoxemia 04/16/2015    Hypoxia     No past surgical history on file.  Family History   Problem Relation Name Age of Onset    No Known Problems Mother      No Known Problems Father       Social History     Tobacco Use    Smoking status: Never    Smokeless tobacco: Never   Vaping Use    Vaping status: Never Used   Substance Use Topics    Alcohol use: Never    Drug use: Never       Physical Exam   ED Triage Vitals   Temp Pulse Resp BP   -- -- -- --      SpO2 Temp src Heart Rate Source Patient Position   -- -- -- --      BP Location FiO2 (%)     -- --       Physical Exam  Vitals and nursing note reviewed.   Constitutional:       General: He is not in acute distress.     Appearance: Normal appearance. He is well-developed.   HENT:      Head: Normocephalic and atraumatic.      Right Ear: Tympanic membrane, ear canal and external ear normal.      Left Ear: Tympanic membrane, ear canal and external ear normal.      Nose: Congestion and rhinorrhea present.      Mouth/Throat:      Mouth: Mucous membranes are dry.      Pharynx: Oropharynx is clear.      Comments: Pt with hx of protrusion of his tongue (normally)   But appears dry   Eyes:       "Extraocular Movements: Extraocular movements intact.      Conjunctiva/sclera: Conjunctivae normal.      Pupils: Pupils are equal, round, and reactive to light.   Cardiovascular:      Rate and Rhythm: Normal rate and regular rhythm.      Heart sounds: No murmur heard.  Pulmonary:      Effort: Pulmonary effort is normal. No respiratory distress.      Breath sounds: Normal breath sounds.   Abdominal:      General: Bowel sounds are normal.      Palpations: Abdomen is soft.      Tenderness: There is no abdominal tenderness.   Musculoskeletal:         General: No swelling.      Cervical back: Normal range of motion and neck supple.   Skin:     General: Skin is warm and dry.      Capillary Refill: Capillary refill takes less than 2 seconds.   Neurological:      General: No focal deficit present.      Mental Status: He is alert. Mental status is at baseline.   Psychiatric:         Mood and Affect: Mood normal.           ED Course & MDM   ED Course as of 03/17/25 2137   Mon Mar 17, 2025   1505 ECG 12 lead [CP]      ED Course User Index  [CP] Lyn Lemus PA-C         Diagnoses as of 03/17/25 2137   Cecal volvulus (Multi)   Partial intestinal obstruction, unspecified cause (Multi)   Proctitis                 No data recorded                                 Medical Decision Making  Differential:   1) cecal volvulus   2) early bowel obstruction   3) proctitis   4) recurrent groundglass opacities right lower lobe     15-year-old male from group home with history of Down syndrome nonverbal at baseline hypertension cardiac pacemaker had went to stand up today and fell backwards and the nurse at the facility noted that his blood pressure was \"low\" has had other episodes when he said his \"pneumonia\" so he was worked up for possible infectious process with his pressures here being stable  Lab work showed no leukocytosis no left shift troponins x 2 within normal limits urinalysis negative chest x-ray showed questionable right " basilar airspace consolidation.    Patient's sister would like the patient to go to [facility] did speak with transfer center currently waiting for the hospitalist to call back     Plan: Discussed differential with the patient and /or parents family   Patient to  follow up with the PCP in the next 2-3 days  Return for any worsening symptoms or go to the ER for further evaluation. Patient/family/caregiver  understands return   precautions and discharge instructions and is agreeable to the current plan   Impression: Cecal volvulus, partial small bowel obstruction, prostatitis        Orders and Diagnoses for this visit    Labs Reviewed  CBC WITH AUTO DIFFERENTIAL - Abnormal     WBC                           3.6 (*)                nRBC                          0.0                    RBC                           4.70                   Hemoglobin                    15.1                   Hematocrit                    45.5                   MCV                           97                     MCH                           32.1                   MCHC                          33.2                   RDW                           15.0 (*)               Platelets                     185                    Neutrophils %                 59.1                   Immature Granulocytes %, Automated   2.8 (*)                Lymphocytes %                 18.2                   Monocytes %                   17.1                   Eosinophils %                 1.1                    Basophils %                   1.7                    Neutrophils Absolute          2.14                   Immature Granulocytes Absolute, Au*   0.10                   Lymphocytes Absolute          0.66 (*)               Monocytes Absolute            0.62                   Eosinophils Absolute          0.04                   Basophils Absolute            0.06                COMPREHENSIVE METABOLIC PANEL - Abnormal     Glucose                       91                      Sodium                        142                    Potassium                     4.4                    Chloride                      105                    Bicarbonate                   29                     Anion Gap                     12                     Urea Nitrogen                 21                     Creatinine                    1.39 (*)               eGFR                          58 (*)                 Calcium                       9.1                    Albumin                       3.6                    Alkaline Phosphatase          64                     Total Protein                 6.8                    AST                           21                     Bilirubin, Total              0.5                    ALT                           29                  URINALYSIS WITH REFLEX MICROSCOPIC - Abnormal     Color, Urine                                         Appearance, Urine             Clear                  Specific Gravity, Urine       1.010                  pH, Urine                     6.5                    Protein, Urine                NEGATIVE                Glucose, Urine                Normal                 Blood, Urine                  0.1 (1+) (*)               Ketones, Urine                NEGATIVE                Bilirubin, Urine              NEGATIVE                Urobilinogen, Urine           Normal                 Nitrite, Urine                NEGATIVE                Leukocyte Esterase, Urine     NEGATIVE             MICROSCOPIC ONLY, URINE - Abnormal     WBC, Urine                    1-5                    RBC, Urine                    6-10 (*)               Mucus, Urine                  FEW                 SARS-COV-2 AND INFLUENZA A/B PCR - Normal     Flu A Result                                         Flu B Result                                         Coronavirus 2019, PCR                                    Narrative: This assay is an FDA-cleared, in  vitro diagnostic nucleic acid amplification test for the qualitative detection and differentiation of SARS CoV-2/ Influenza A/B from nasopharyngeal specimens collected from individuals with signs and symptoms of respiratory tract infections, and has been validated for use at [facility]. Negative results do not preclude COVID-19/ Influenza A/B infections and should not be used as the sole basis for diagnosis, treatment, or other management decisions. Testing for SARS CoV-2 is recommended only for patients who meet current clinical and/or epidemiological criteria defined by federal, state, or local public health directives.  SERIAL TROPONIN-INITIAL - Normal     Troponin I, High Sensitivity   3                          Narrative: Less than 99th percentile of normal range cutoff-                  Female and children under 18 years old <14 ng/L; Male <21 ng/L: Negative                  Repeat testing should be performed if clinically indicated.                                     Female and children under 18 years old 14-50 ng/L; Male 21-50 ng/L:                  Consistent with possible cardiac damage and possible increased clinical                   risk. Serial measurements may help to assess extent of myocardial damage.                                     >50 ng/L: Consistent with cardiac damage, increased clinical risk and                  myocardial infarction. Serial measurements may help assess extent of                   myocardial damage.                                      NOTE: Children less than 1 year old may have higher baseline troponin                   levels and results should be interpreted in conjunction with the overall                   clinical context.                                     NOTE: Troponin I testing is performed using a different                   testing methodology at [facility] than at other                   [facility]. Direct result  comparisons should only                   be made within the same method.  SERIAL TROPONIN, 1 HOUR - Normal     Troponin I, High Sensitivity   3                          Narrative: Less than 99th percentile of normal range cutoff-                  Female and children under 18 years old <14 ng/L; Male <21 ng/L: Negative                  Repeat testing should be performed if clinically indicated.                                     Female and children under 18 years old 14-50 ng/L; Male 21-50 ng/L:                  Consistent with possible cardiac damage and possible increased clinical                   risk. Serial measurements may help to assess extent of myocardial damage.                                     >50 ng/L: Consistent with cardiac damage, increased clinical risk and                  myocardial infarction. Serial measurements may help assess extent of                   myocardial damage.                                      NOTE: Children less than 1 year old may have higher baseline troponin                   levels and results should be interpreted in conjunction with the overall                   clinical context.                                     NOTE: Troponin I testing is performed using a different                   testing methodology at [facility] than at other                   [facility]. Direct result comparisons should only                   be made within the same method.  LACTATE - Normal     Lactate                       0.7                        Narrative: Venipuncture immediately after or during the administration of Metamizole may lead to falsely low results. Testing should be performed immediately prior to Metamizole dosing.  BLOOD CULTURE  BLOOD CULTURE  TROPONIN SERIES- (INITIAL, 1 HR)  CT chest abdomen pelvis w IV contrast   Final Result    1. Swirling of the right lower quadrant mesentery and 360 degree    twisting of the ascending colon on its axis  consistent with cecal    volvulus. There is mild upstream dilatation of the ileal loops, which    could relate to early/partial bowel obstruction. Surgical    consultation recommended.          2. Circumferential wall thickening only involving the distal rectum    with subtle surrounding fat stranding. Differential diagnosis    includes proctitis and rectal malignancy. Recommend consultation with    GI or colorectal surgery for further diagnostic assessment.          3. Diffuse bilateral bronchial wall thickening, bilateral    peribronchovascular ground-glass opacities and interlobular septal    thickening in the right > lower lobes are findings that are    compatible pulmonary edema; however, given the debris in the trachea    and dilated esophagus, infectious bronchitis is not excluded in the    appropriate clinical context.          4. No free intraperitoneal air.          MACRO:    Masoud Tejada discussed the significance and urgency of this    critical finding by telephone with  CASSIE REIS on 3/17/2025 at 8:20    pm.  (**-RCF-**) Findings:  See findings.          Signed by: Masoud Tejada 3/17/2025 8:20 PM    Dictation workstation:   [id number]     XR chest 1 view   Final Result    1. Right basilar airspace consolidation, as above. Clinical    correlation and continued follow-up until clearing is recommended.    2. Free air under the right hemidiaphragm, as above.          MACRO:    None.          Signed by: Isaac Schmitt 3/17/2025 3:14 PM    Dictation workstation:   [id number]         Amount and/or Complexity of Data Reviewed  ECG/medicine tests: independent interpretation performed.     Details: EKG done at 3:09 PM shows atrial paced rhythm rate of 62-year-old multiple baseline artifact.  Axis normal QT/QTc normal at 424/433.  The patient        Procedure  Procedures     I saw and evaluated the patient. I have personally performed a substantive portion of the encounter and have reviewed the  resident´s/REEMA documentation and discussed the patient with the provider.  Please see below for further details.    Subjective:  Patient is a 59-year-old male from a group home with Down syndrome.    He was hypotensive with standing today and had a fall to the floor without striking his head.  There is no reported loss of conscious or seizure activity.  The patient is unable to answer questions which is his baseline.    Objective:  Head atraumatic normocephalic  Neck supple nontender  Lung sounds clear and equal  Heart tones normal and regular  Chest wall nontender  Thoracic lumbar and cervical spine are nontender to palpation  Pelvis is stable nontender lower extremities show no significant deformity or tenderness.    Assessment:  Hypotension rule out dehydration, renal sufficiency, reduced cardiac output, orthostasis    Plan:  Evaluate and treat and disposition I will participate in the interpretation of the results and the  disposition decision..    DEBORAH Lemus PA-C  03/17/25 4001       Lyn Lemus PA-C  03/17/25 6479

## 2025-03-18 ENCOUNTER — APPOINTMENT (OUTPATIENT)
Dept: CARDIOLOGY | Facility: HOSPITAL | Age: 60
DRG: 392 | End: 2025-03-18
Payer: MEDICARE

## 2025-03-18 ENCOUNTER — HOSPITAL ENCOUNTER (INPATIENT)
Facility: HOSPITAL | Age: 60
DRG: 392 | End: 2025-03-18
Admitting: SURGERY
Payer: MEDICARE

## 2025-03-18 VITALS
SYSTOLIC BLOOD PRESSURE: 89 MMHG | OXYGEN SATURATION: 99 % | TEMPERATURE: 97 F | WEIGHT: 127 LBS | BODY MASS INDEX: 24.94 KG/M2 | RESPIRATION RATE: 14 BRPM | DIASTOLIC BLOOD PRESSURE: 57 MMHG | HEART RATE: 62 BPM | HEIGHT: 60 IN

## 2025-03-18 DIAGNOSIS — R55 SYNCOPE, UNSPECIFIED SYNCOPE TYPE: ICD-10-CM

## 2025-03-18 DIAGNOSIS — R10.84 ABDOMINAL PAIN, GENERALIZED: ICD-10-CM

## 2025-03-18 DIAGNOSIS — J69.0 ASPIRATION PNEUMONIA DUE TO REGURGITATED FOOD, UNSPECIFIED LATERALITY, UNSPECIFIED PART OF LUNG (MULTI): ICD-10-CM

## 2025-03-18 DIAGNOSIS — K56.2 VOLVULUS (MULTI): Primary | ICD-10-CM

## 2025-03-18 DIAGNOSIS — E03.9 ACQUIRED HYPOTHYROIDISM: ICD-10-CM

## 2025-03-18 DIAGNOSIS — Z95.0 CARDIAC PACEMAKER: ICD-10-CM

## 2025-03-18 DIAGNOSIS — I44.2 COMPLETE HEART BLOCK: ICD-10-CM

## 2025-03-18 LAB
ANION GAP SERPL CALCULATED.3IONS-SCNC: 12 MMOL/L (ref 10–20)
BASOPHILS # BLD AUTO: 0.04 X10*3/UL (ref 0–0.1)
BASOPHILS NFR BLD AUTO: 1.6 %
BUN SERPL-MCNC: 11 MG/DL (ref 6–23)
CALCIUM SERPL-MCNC: 7.5 MG/DL (ref 8.6–10.3)
CHLORIDE SERPL-SCNC: 109 MMOL/L (ref 98–107)
CO2 SERPL-SCNC: 19 MMOL/L (ref 21–32)
CREAT SERPL-MCNC: 0.76 MG/DL (ref 0.5–1.3)
EGFRCR SERPLBLD CKD-EPI 2021: >90 ML/MIN/1.73M*2
EOSINOPHIL # BLD AUTO: 0.03 X10*3/UL (ref 0–0.7)
EOSINOPHIL NFR BLD AUTO: 1.2 %
ERYTHROCYTE [DISTWIDTH] IN BLOOD BY AUTOMATED COUNT: 15.4 % (ref 11.5–14.5)
GLUCOSE SERPL-MCNC: 67 MG/DL (ref 74–99)
HCT VFR BLD AUTO: 35.7 % (ref 41–52)
HGB BLD-MCNC: 11.9 G/DL (ref 13.5–17.5)
IMM GRANULOCYTES # BLD AUTO: 0.03 X10*3/UL (ref 0–0.7)
IMM GRANULOCYTES NFR BLD AUTO: 1.2 % (ref 0–0.9)
LYMPHOCYTES # BLD AUTO: 0.69 X10*3/UL (ref 1.2–4.8)
LYMPHOCYTES NFR BLD AUTO: 27.8 %
MAGNESIUM SERPL-MCNC: 1.38 MG/DL (ref 1.6–2.4)
MCH RBC QN AUTO: 32.5 PG (ref 26–34)
MCHC RBC AUTO-ENTMCNC: 33.3 G/DL (ref 32–36)
MCV RBC AUTO: 98 FL (ref 80–100)
MONOCYTES # BLD AUTO: 0.32 X10*3/UL (ref 0.1–1)
MONOCYTES NFR BLD AUTO: 12.9 %
NEUTROPHILS # BLD AUTO: 1.37 X10*3/UL (ref 1.2–7.7)
NEUTROPHILS NFR BLD AUTO: 55.3 %
NRBC BLD-RTO: 0 /100 WBCS (ref 0–0)
PHOSPHATE SERPL-MCNC: 1.9 MG/DL (ref 2.5–4.9)
PLATELET # BLD AUTO: 139 X10*3/UL (ref 150–450)
POTASSIUM SERPL-SCNC: 3.9 MMOL/L (ref 3.5–5.3)
RBC # BLD AUTO: 3.66 X10*6/UL (ref 4.5–5.9)
SODIUM SERPL-SCNC: 136 MMOL/L (ref 136–145)
T4 FREE SERPL-MCNC: 0.92 NG/DL (ref 0.61–1.12)
TSH SERPL-ACNC: 15.82 MIU/L (ref 0.44–3.98)
WBC # BLD AUTO: 2.5 X10*3/UL (ref 4.4–11.3)

## 2025-03-18 PROCEDURE — 84100 ASSAY OF PHOSPHORUS: CPT | Performed by: SURGERY

## 2025-03-18 PROCEDURE — 85025 COMPLETE CBC W/AUTO DIFF WBC: CPT | Performed by: SURGERY

## 2025-03-18 PROCEDURE — 83735 ASSAY OF MAGNESIUM: CPT | Performed by: SURGERY

## 2025-03-18 PROCEDURE — 36415 COLL VENOUS BLD VENIPUNCTURE: CPT | Performed by: SURGERY

## 2025-03-18 PROCEDURE — 99285 EMERGENCY DEPT VISIT HI MDM: CPT

## 2025-03-18 PROCEDURE — 2500000004 HC RX 250 GENERAL PHARMACY W/ HCPCS (ALT 636 FOR OP/ED): Performed by: EMERGENCY MEDICINE

## 2025-03-18 PROCEDURE — 80048 BASIC METABOLIC PNL TOTAL CA: CPT | Performed by: SURGERY

## 2025-03-18 PROCEDURE — 99223 1ST HOSP IP/OBS HIGH 75: CPT

## 2025-03-18 PROCEDURE — 82374 ASSAY BLOOD CARBON DIOXIDE: CPT | Performed by: SURGERY

## 2025-03-18 PROCEDURE — 2500000004 HC RX 250 GENERAL PHARMACY W/ HCPCS (ALT 636 FOR OP/ED): Performed by: SURGERY

## 2025-03-18 PROCEDURE — 2060000001 HC INTERMEDIATE ICU ROOM DAILY

## 2025-03-18 PROCEDURE — 93005 ELECTROCARDIOGRAM TRACING: CPT

## 2025-03-18 RX ORDER — CETIRIZINE HYDROCHLORIDE 10 MG/1
10 TABLET ORAL DAILY
Status: DISCONTINUED | OUTPATIENT
Start: 2025-03-18 | End: 2025-03-25 | Stop reason: HOSPADM

## 2025-03-18 RX ORDER — ALUMINUM HYDROXIDE, MAGNESIUM HYDROXIDE, AND SIMETHICONE 1200; 120; 1200 MG/30ML; MG/30ML; MG/30ML
5 SUSPENSION ORAL EVERY 6 HOURS PRN
Status: DISCONTINUED | OUTPATIENT
Start: 2025-03-18 | End: 2025-03-25 | Stop reason: HOSPADM

## 2025-03-18 RX ORDER — SODIUM CHLORIDE, SODIUM LACTATE, POTASSIUM CHLORIDE, CALCIUM CHLORIDE 600; 310; 30; 20 MG/100ML; MG/100ML; MG/100ML; MG/100ML
75 INJECTION, SOLUTION INTRAVENOUS CONTINUOUS
Status: ACTIVE | OUTPATIENT
Start: 2025-03-18 | End: 2025-03-19

## 2025-03-18 RX ORDER — HYDROXYZINE HYDROCHLORIDE 25 MG/1
100 TABLET, FILM COATED ORAL EVERY 6 HOURS PRN
Status: DISCONTINUED | OUTPATIENT
Start: 2025-03-18 | End: 2025-03-25 | Stop reason: HOSPADM

## 2025-03-18 RX ORDER — ACETAMINOPHEN 500 MG
500 TABLET ORAL EVERY 6 HOURS PRN
Status: DISCONTINUED | OUTPATIENT
Start: 2025-03-18 | End: 2025-03-25 | Stop reason: HOSPADM

## 2025-03-18 RX ORDER — POLYETHYLENE GLYCOL 3350 17 G/17G
17 POWDER, FOR SOLUTION ORAL DAILY
Status: DISCONTINUED | OUTPATIENT
Start: 2025-03-18 | End: 2025-03-25 | Stop reason: HOSPADM

## 2025-03-18 RX ORDER — POLYETHYLENE GLYCOL 3350 17 G/17G
238 POWDER, FOR SOLUTION ORAL ONCE AS NEEDED
Status: COMPLETED | OUTPATIENT
Start: 2025-03-18 | End: 2025-03-18

## 2025-03-18 RX ORDER — ATORVASTATIN CALCIUM 40 MG/1
40 TABLET, FILM COATED ORAL NIGHTLY
Status: DISCONTINUED | OUTPATIENT
Start: 2025-03-18 | End: 2025-03-25 | Stop reason: HOSPADM

## 2025-03-18 RX ORDER — LEVOTHYROXINE SODIUM 112 UG/1
112 TABLET ORAL DAILY
Status: DISCONTINUED | OUTPATIENT
Start: 2025-03-18 | End: 2025-03-24

## 2025-03-18 RX ORDER — DOCUSATE SODIUM 100 MG/1
200 CAPSULE, LIQUID FILLED ORAL DAILY
Status: DISCONTINUED | OUTPATIENT
Start: 2025-03-18 | End: 2025-03-25 | Stop reason: HOSPADM

## 2025-03-18 RX ORDER — ALBUTEROL SULFATE 0.83 MG/ML
5 SOLUTION RESPIRATORY (INHALATION) EVERY 4 HOURS PRN
Status: DISCONTINUED | OUTPATIENT
Start: 2025-03-18 | End: 2025-03-25 | Stop reason: HOSPADM

## 2025-03-18 RX ORDER — ALBUTEROL SULFATE 90 UG/1
2 INHALANT RESPIRATORY (INHALATION) EVERY 4 HOURS PRN
Status: DISCONTINUED | OUTPATIENT
Start: 2025-03-18 | End: 2025-03-18 | Stop reason: SDUPTHER

## 2025-03-18 RX ORDER — FAMOTIDINE 20 MG/1
20 TABLET, FILM COATED ORAL 2 TIMES DAILY
Status: DISCONTINUED | OUTPATIENT
Start: 2025-03-18 | End: 2025-03-25 | Stop reason: HOSPADM

## 2025-03-18 RX ORDER — LEVOTHYROXINE SODIUM ANHYDROUS 100 UG/5ML
50 INJECTION, POWDER, LYOPHILIZED, FOR SOLUTION INTRAVENOUS
Status: DISCONTINUED | OUTPATIENT
Start: 2025-03-18 | End: 2025-03-19

## 2025-03-18 RX ADMIN — SODIUM CHLORIDE 1000 ML: 0.9 INJECTION, SOLUTION INTRAVENOUS at 01:58

## 2025-03-18 RX ADMIN — LEVOTHYROXINE SODIUM ANHYDROUS 50 MCG: 100 INJECTION, POWDER, LYOPHILIZED, FOR SOLUTION INTRAVENOUS at 12:30

## 2025-03-18 RX ADMIN — SODIUM CHLORIDE, SODIUM LACTATE, POTASSIUM CHLORIDE, AND CALCIUM CHLORIDE 75 ML/HR: 600; 310; 30; 20 INJECTION, SOLUTION INTRAVENOUS at 09:24

## 2025-03-18 RX ADMIN — POLYETHYLENE GLYCOL 3350 1 G: 17 POWDER, FOR SOLUTION ORAL at 15:39

## 2025-03-18 SDOH — ECONOMIC STABILITY: FOOD INSECURITY: HOW HARD IS IT FOR YOU TO PAY FOR THE VERY BASICS LIKE FOOD, HOUSING, MEDICAL CARE, AND HEATING?: NOT VERY HARD

## 2025-03-18 SDOH — ECONOMIC STABILITY: FOOD INSECURITY: WITHIN THE PAST 12 MONTHS, YOU WORRIED THAT YOUR FOOD WOULD RUN OUT BEFORE YOU GOT THE MONEY TO BUY MORE.: NEVER TRUE

## 2025-03-18 SDOH — ECONOMIC STABILITY: HOUSING INSECURITY: IN THE PAST 12 MONTHS, HOW MANY TIMES HAVE YOU MOVED WHERE YOU WERE LIVING?: 0

## 2025-03-18 SDOH — SOCIAL STABILITY: SOCIAL INSECURITY: WITHIN THE LAST YEAR, HAVE YOU BEEN AFRAID OF YOUR PARTNER OR EX-PARTNER?: NO

## 2025-03-18 SDOH — SOCIAL STABILITY: SOCIAL INSECURITY: ARE YOU OR HAVE YOU BEEN THREATENED OR ABUSED PHYSICALLY, EMOTIONALLY, OR SEXUALLY BY ANYONE?: NO

## 2025-03-18 SDOH — SOCIAL STABILITY: SOCIAL NETWORK: HOW OFTEN DO YOU GET TOGETHER WITH FRIENDS OR RELATIVES?: MORE THAN THREE TIMES A WEEK

## 2025-03-18 SDOH — ECONOMIC STABILITY: TRANSPORTATION INSECURITY: IN THE PAST 12 MONTHS, HAS LACK OF TRANSPORTATION KEPT YOU FROM MEDICAL APPOINTMENTS OR FROM GETTING MEDICATIONS?: NO

## 2025-03-18 SDOH — SOCIAL STABILITY: SOCIAL INSECURITY: DOES ANYONE TRY TO KEEP YOU FROM HAVING/CONTACTING OTHER FRIENDS OR DOING THINGS OUTSIDE YOUR HOME?: NO

## 2025-03-18 SDOH — ECONOMIC STABILITY: FOOD INSECURITY: WITHIN THE PAST 12 MONTHS, THE FOOD YOU BOUGHT JUST DIDN'T LAST AND YOU DIDN'T HAVE MONEY TO GET MORE.: NEVER TRUE

## 2025-03-18 SDOH — ECONOMIC STABILITY: INCOME INSECURITY: IN THE PAST 12 MONTHS HAS THE ELECTRIC, GAS, OIL, OR WATER COMPANY THREATENED TO SHUT OFF SERVICES IN YOUR HOME?: NO

## 2025-03-18 SDOH — SOCIAL STABILITY: SOCIAL INSECURITY: ARE YOU MARRIED, WIDOWED, DIVORCED, SEPARATED, NEVER MARRIED, OR LIVING WITH A PARTNER?: PATIENT UNABLE TO ANSWER

## 2025-03-18 SDOH — ECONOMIC STABILITY: HOUSING INSECURITY: IN THE LAST 12 MONTHS, WAS THERE A TIME WHEN YOU WERE NOT ABLE TO PAY THE MORTGAGE OR RENT ON TIME?: NO

## 2025-03-18 SDOH — SOCIAL STABILITY: SOCIAL INSECURITY: HAS ANYONE EVER THREATENED TO HURT YOUR FAMILY OR YOUR PETS?: NO

## 2025-03-18 SDOH — ECONOMIC STABILITY: HOUSING INSECURITY: AT ANY TIME IN THE PAST 12 MONTHS, WERE YOU HOMELESS OR LIVING IN A SHELTER (INCLUDING NOW)?: NO

## 2025-03-18 SDOH — SOCIAL STABILITY: SOCIAL INSECURITY: WERE YOU ABLE TO COMPLETE ALL THE BEHAVIORAL HEALTH SCREENINGS?: YES

## 2025-03-18 SDOH — SOCIAL STABILITY: SOCIAL INSECURITY: ABUSE: ADULT

## 2025-03-18 SDOH — SOCIAL STABILITY: SOCIAL INSECURITY: WITHIN THE LAST YEAR, HAVE YOU BEEN HUMILIATED OR EMOTIONALLY ABUSED IN OTHER WAYS BY YOUR PARTNER OR EX-PARTNER?: NO

## 2025-03-18 SDOH — HEALTH STABILITY: PHYSICAL HEALTH: ON AVERAGE, HOW MANY DAYS PER WEEK DO YOU ENGAGE IN MODERATE TO STRENUOUS EXERCISE (LIKE A BRISK WALK)?: 0 DAYS

## 2025-03-18 SDOH — SOCIAL STABILITY: SOCIAL INSECURITY: DO YOU FEEL UNSAFE GOING BACK TO THE PLACE WHERE YOU ARE LIVING?: NO

## 2025-03-18 SDOH — SOCIAL STABILITY: SOCIAL NETWORK: HOW OFTEN DO YOU ATTEND MEETINGS OF THE CLUBS OR ORGANIZATIONS YOU BELONG TO?: NEVER

## 2025-03-18 SDOH — SOCIAL STABILITY: SOCIAL INSECURITY: HAVE YOU HAD THOUGHTS OF HARMING ANYONE ELSE?: NO

## 2025-03-18 SDOH — SOCIAL STABILITY: SOCIAL INSECURITY: HAVE YOU HAD ANY THOUGHTS OF HARMING ANYONE ELSE?: NO

## 2025-03-18 SDOH — SOCIAL STABILITY: SOCIAL NETWORK: HOW OFTEN DO YOU ATTEND CHURCH OR RELIGIOUS SERVICES?: NEVER

## 2025-03-18 SDOH — HEALTH STABILITY: PHYSICAL HEALTH: ON AVERAGE, HOW MANY MINUTES DO YOU ENGAGE IN EXERCISE AT THIS LEVEL?: 0 MIN

## 2025-03-18 SDOH — SOCIAL STABILITY: SOCIAL INSECURITY: ARE THERE ANY APPARENT SIGNS OF INJURIES/BEHAVIORS THAT COULD BE RELATED TO ABUSE/NEGLECT?: NO

## 2025-03-18 SDOH — SOCIAL STABILITY: SOCIAL INSECURITY: DO YOU FEEL ANYONE HAS EXPLOITED OR TAKEN ADVANTAGE OF YOU FINANCIALLY OR OF YOUR PERSONAL PROPERTY?: NO

## 2025-03-18 ASSESSMENT — COGNITIVE AND FUNCTIONAL STATUS - GENERAL
TURNING FROM BACK TO SIDE WHILE IN FLAT BAD: A LOT
HELP NEEDED FOR BATHING: A LOT
MOVING FROM LYING ON BACK TO SITTING ON SIDE OF FLAT BED WITH BEDRAILS: A LOT
TURNING FROM BACK TO SIDE WHILE IN FLAT BAD: A LOT
MOVING FROM LYING ON BACK TO SITTING ON SIDE OF FLAT BED WITH BEDRAILS: A LOT
TOILETING: A LOT
DRESSING REGULAR LOWER BODY CLOTHING: A LOT
DAILY ACTIVITIY SCORE: 12
HELP NEEDED FOR BATHING: A LOT
PATIENT BASELINE BEDBOUND: NO
TOILETING: A LOT
WALKING IN HOSPITAL ROOM: TOTAL
MOVING TO AND FROM BED TO CHAIR: A LOT
CLIMB 3 TO 5 STEPS WITH RAILING: A LOT
MOBILITY SCORE: 10
EATING MEALS: A LOT
DRESSING REGULAR UPPER BODY CLOTHING: A LOT
DAILY ACTIVITIY SCORE: 12
PERSONAL GROOMING: A LOT
MOBILITY SCORE: 12
STANDING UP FROM CHAIR USING ARMS: A LOT
DRESSING REGULAR LOWER BODY CLOTHING: A LOT
WALKING IN HOSPITAL ROOM: A LOT
PERSONAL GROOMING: A LOT
STANDING UP FROM CHAIR USING ARMS: A LOT
MOVING TO AND FROM BED TO CHAIR: A LOT
EATING MEALS: A LOT
DRESSING REGULAR UPPER BODY CLOTHING: A LOT
CLIMB 3 TO 5 STEPS WITH RAILING: TOTAL

## 2025-03-18 ASSESSMENT — PAIN SCALES - GENERAL
PAINLEVEL_OUTOF10: 0 - NO PAIN
PAINLEVEL_OUTOF10: 4
PAINLEVEL_OUTOF10: 0 - NO PAIN
PAINLEVEL_OUTOF10: 0 - NO PAIN

## 2025-03-18 ASSESSMENT — PAIN SCALES - WONG BAKER: WONGBAKER_NUMERICALRESPONSE: NO HURT

## 2025-03-18 ASSESSMENT — PAIN DESCRIPTION - PROGRESSION: CLINICAL_PROGRESSION: NOT CHANGED

## 2025-03-18 ASSESSMENT — LIFESTYLE VARIABLES
AUDIT-C TOTAL SCORE: 0
HOW OFTEN DO YOU HAVE A DRINK CONTAINING ALCOHOL: NEVER
HOW OFTEN DO YOU HAVE 6 OR MORE DRINKS ON ONE OCCASION: NEVER
HOW MANY STANDARD DRINKS CONTAINING ALCOHOL DO YOU HAVE ON A TYPICAL DAY: PATIENT DOES NOT DRINK
SKIP TO QUESTIONS 9-10: 1
AUDIT-C TOTAL SCORE: 0

## 2025-03-18 ASSESSMENT — ACTIVITIES OF DAILY LIVING (ADL)
HEARING - LEFT EAR: FUNCTIONAL
BATHING: NEEDS ASSISTANCE
FEEDING YOURSELF: NEEDS ASSISTANCE
TOILETING: NEEDS ASSISTANCE
ADEQUATE_TO_COMPLETE_ADL: NO
PATIENT'S MEMORY ADEQUATE TO SAFELY COMPLETE DAILY ACTIVITIES?: NO
HEARING - RIGHT EAR: FUNCTIONAL
JUDGMENT_ADEQUATE_SAFELY_COMPLETE_DAILY_ACTIVITIES: NO
LACK_OF_TRANSPORTATION: NO
WALKS IN HOME: DEPENDENT
DRESSING YOURSELF: NEEDS ASSISTANCE
GROOMING: NEEDS ASSISTANCE
LACK_OF_TRANSPORTATION: NO

## 2025-03-18 ASSESSMENT — COLUMBIA-SUICIDE SEVERITY RATING SCALE - C-SSRS
6. HAVE YOU EVER DONE ANYTHING, STARTED TO DO ANYTHING, OR PREPARED TO DO ANYTHING TO END YOUR LIFE?: NO
1. IN THE PAST MONTH, HAVE YOU WISHED YOU WERE DEAD OR WISHED YOU COULD GO TO SLEEP AND NOT WAKE UP?: NO
2. HAVE YOU ACTUALLY HAD ANY THOUGHTS OF KILLING YOURSELF?: NO

## 2025-03-18 ASSESSMENT — PATIENT HEALTH QUESTIONNAIRE - PHQ9
1. LITTLE INTEREST OR PLEASURE IN DOING THINGS: NOT AT ALL
2. FEELING DOWN, DEPRESSED OR HOPELESS: NOT AT ALL
SUM OF ALL RESPONSES TO PHQ9 QUESTIONS 1 & 2: 0

## 2025-03-18 ASSESSMENT — PAIN - FUNCTIONAL ASSESSMENT
PAIN_FUNCTIONAL_ASSESSMENT: 0-10
PAIN_FUNCTIONAL_ASSESSMENT: FLACC (FACE, LEGS, ACTIVITY, CRY, CONSOLABILITY)

## 2025-03-18 NOTE — CARE PLAN
The patient's goals for the shift include increase communication w staff to express needs     The clinical goals for the shift include GI interventions    Over the shift, the patient did not make progress toward the following goals. Barriers to progression include pt has down's syndrome, unable to communicate effectively w/ staff. Recommendations to address these barriers include communication w/ caregiver, awareness of non-verbal cues.

## 2025-03-18 NOTE — ED PROVIDER NOTES
HPI   Chief Complaint   Patient presents with    Abdominal Pain       Patient is a 59-year-old male presenting with a chief complaint of a syncopal episode, they are concerned that he had low blood pressure and had a single episode of his group home, thus they sent him to the emergency department, patient initially seen at [facility] emergency department, patient was found to have a volvulus, case discussed with the hospitalist team and the general surgery team, patient will be excepted to the general surgery service, general surgery did request the patient be seen initially in the emergency department to determine whether stepdown versus ICU, patient's blood pressure was stable upon arrival emergency department,       History provided by:  EMS personnel, relative and caregiver          Patient History   Past Medical History:   Diagnosis Date    Hypoxemia 04/16/2015    Hypoxia     History reviewed. No pertinent surgical history.  Family History   Problem Relation Name Age of Onset    No Known Problems Mother      No Known Problems Father       Social History     Tobacco Use    Smoking status: Never    Smokeless tobacco: Never   Vaping Use    Vaping status: Never Used   Substance Use Topics    Alcohol use: Never    Drug use: Never       Physical Exam   ED Triage Vitals [03/18/25 0349]   Temperature Heart Rate Respirations BP   36.1 °C (97 °F) 63 18 100/53      Pulse Ox Temp Source Heart Rate Source Patient Position   100 % Temporal -- --      BP Location FiO2 (%)     -- --       Physical Exam  Vitals and nursing note reviewed. Exam conducted with a chaperone present.   Constitutional:       General: He is not in acute distress.     Appearance: Normal appearance. He is normal weight. He is not ill-appearing, toxic-appearing or diaphoretic.   HENT:      Head: Normocephalic and atraumatic.      Nose: Nose normal.      Mouth/Throat:      Mouth: Mucous membranes are moist.      Pharynx: Oropharynx is clear.   Eyes:       Extraocular Movements: Extraocular movements intact.      Conjunctiva/sclera: Conjunctivae normal.      Pupils: Pupils are equal, round, and reactive to light.   Cardiovascular:      Rate and Rhythm: Normal rate and regular rhythm.      Pulses: Normal pulses.      Heart sounds: Normal heart sounds.   Pulmonary:      Effort: Pulmonary effort is normal.      Breath sounds: Normal breath sounds.   Abdominal:      General: Abdomen is flat. Bowel sounds are normal.      Palpations: Abdomen is soft.      Tenderness: There is abdominal tenderness.   Musculoskeletal:         General: Normal range of motion.   Skin:     General: Skin is warm and dry.      Capillary Refill: Capillary refill takes less than 2 seconds.   Neurological:      Mental Status: Mental status is at baseline.           ED Course & MDM   Diagnoses as of 03/18/25 0537   Abdominal pain, generalized   Volvulus (Multi)                 No data recorded     Levels Coma Scale Score: 11 (03/18/25 0350 : Jewel Gilliland RN)                           Medical Decision Making  Patient seen and evaluated at bedside, patient is in no acute distress.  I will order a admission to general surgery. Differential diagnosis includes but is not limited to volvulus  Patient is admitted to the general surgery team.    Diagnosis: Abdominal pain, volvulus.        Procedure  Procedures  Sections of this report were created using voice-to-text technology and may contain errors in translation    Main Jeffrey DO  Emergency Medicine         Main Jeffrey DO  03/18/25 0533

## 2025-03-18 NOTE — CARE PLAN
The patient's goals for the shift include increase communication of need w/ staff     The clinical goals for the shift include GI interventions    Over the shift, the patient did not make progress toward the following goals. Barriers to progression include pt . Recommendations to address these barriers include ***.

## 2025-03-18 NOTE — H&P
"History Of Present Illness  Cyril Hernandez \"Briana" is a 59 y.o. male with medical history of Down syndrome, nonverbal at baseline, hypertension, pacemaker presenting with a syncopal episode.  Patient lives at a group home and apparently was sitting down and went to stand up.  At that time patient fell down and landed on his bottom.  He did not hit his head.  At that time, he was hypotensive therefore he was sent to the emergency department. CT A/P demonstrated swirling of the right lower quadrant mesentery consistent with a cecal volvulus.  There is also mild dilatation of the ileal loops possibly indicating an early small bowel obstruction.  There is no free air.  Patient's sister is present at the bedside.       Past Medical History  Past Medical History:   Diagnosis Date    Hypoxemia 04/16/2015    Hypoxia   Hypertension  HLD  Cardiac pacemaker  Hypothyroidism  Surgical History  History reviewed. No pertinent surgical history.     Social History  He reports that he has never smoked. He has never used smokeless tobacco. He reports that he does not drink alcohol and does not use drugs.    Family History  Family History   Problem Relation Name Age of Onset    No Known Problems Mother      No Known Problems Father          Allergies  Inhaled anesthetics (halogen based), Peas, Sulfa (sulfonamide antibiotics), and Chocolate hazelnut flavor    Review of Systems   Reason unable to perform ROS: Patient is nonverbal.        Physical Exam  Vitals and nursing note reviewed.   Constitutional:       Appearance: Normal appearance. He is not toxic-appearing.   HENT:      Head: Normocephalic and atraumatic.   Cardiovascular:      Rate and Rhythm: Normal rate and regular rhythm.   Pulmonary:      Effort: Pulmonary effort is normal. No respiratory distress.   Abdominal:      General: Bowel sounds are normal. There is no distension.      Palpations: Abdomen is soft.      Tenderness: There is no abdominal tenderness. " "  Musculoskeletal:         General: No swelling or tenderness.   Skin:     General: Skin is warm and dry.   Neurological:      Mental Status: He is alert. Mental status is at baseline.   Psychiatric:         Speech: He is noncommunicative.         Cognition and Memory: Cognition normal.          Last Recorded Vitals  Blood pressure 127/70, pulse 66, temperature 36 °C (96.8 °F), temperature source Temporal, resp. rate 14, height 1.499 m (4' 11\"), weight 53.6 kg (118 lb 2.7 oz), SpO2 99%.    Relevant Results  Lab Results   Component Value Date    GLUCOSE 91 03/17/2025    CALCIUM 9.1 03/17/2025     03/17/2025    K 4.4 03/17/2025    CO2 29 03/17/2025     03/17/2025    BUN 21 03/17/2025    CREATININE 1.39 (H) 03/17/2025     Lab Results   Component Value Date    WBC 3.6 (L) 03/17/2025    HGB 15.1 03/17/2025    HCT 45.5 03/17/2025    MCV 97 03/17/2025     03/17/2025     === 03/17/25 ===    CT CHEST ABDOMEN PELVIS W IV CONTRAST    - Impression -  1. Swirling of the right lower quadrant mesentery and 360 degree  twisting of the ascending colon on its axis consistent with cecal  volvulus. There is mild upstream dilatation of the ileal loops, which  could relate to early/partial bowel obstruction. Surgical  consultation recommended.    2. Circumferential wall thickening only involving the distal rectum  with subtle surrounding fat stranding. Differential diagnosis  includes proctitis and rectal malignancy. Recommend consultation with  GI or colorectal surgery for further diagnostic assessment.    3. Diffuse bilateral bronchial wall thickening, bilateral  peribronchovascular ground-glass opacities and interlobular septal  thickening in the right > lower lobes are findings that are  compatible pulmonary edema; however, given the debris in the trachea  and dilated esophagus, infectious bronchitis is not excluded in the  appropriate clinical context.    4. No free intraperitoneal air.    MACRO:  Masoud Tejada " discussed the significance and urgency of this  critical finding by telephone with  CASSIE REIS on 3/17/2025 at 8:20  pm.  (**-RCF-**) Findings:  See findings.    Signed by: Masoud Terrell 3/17/2025 8:20 PM  Dictation workstation:   [id number]  === 03/17/25 ===    XR CHEST 1 VIEW    - Impression -  1. Right basilar airspace consolidation, as above. Clinical  correlation and continued follow-up until clearing is recommended.  2. Free air under the right hemidiaphragm, as above.    MACRO:  None.    Signed by: Isaac Schmitt 3/17/2025 3:14 PM  Dictation workstation:   [id number]       Assessment/Plan   Assessment & Plan  Volvulus (Multi)    3/18:   59M with Down syndrome presenting with syncopal episode. CT demonstrating cecal volvulus, early SBO. Patient not in any obvious pain or discomfort. His sister reports she hasn't noticed that patient has appeared to have any abdominal pain. GI saw the patient, they are not recommending a scope d/t to risk for perforation. We will see if we can trial a barium enema in radiology. Will restart home meds once no longer NPO. IVFs while NPO.   2. HTN - controlled without medication  3. HLD - on Lipitor  4. Hypothyroid - on Levothyroxine    Will d/w Dr. Eva OROZCO spent 45 minutes in the professional and overall care of this patient.      Ramonita Vaz, APRN-CNP

## 2025-03-18 NOTE — NURSING NOTE
Assumed care of patient.  Patient arrived from ED via cart with sister POA at bedside.  Lung sounds CTA.  Abdomen distended, bowel sounds activex4.  No edema noted.  Skin intact.  Call light in reach.   Sister at bedside.

## 2025-03-18 NOTE — PROGRESS NOTES
Spiritual Care Visit  Spiritual Care Request    Reason for Visit:  Routine Visit: Introduction     Request Received From:       Focus of Care:  Visited With: Patient and family together         Refer to :          Spiritual Care Assessment    Spiritual Assessment:                      Care Provided:  Intended Effects: Build relationship of care and support, Demonstrate caring and concern, Promote sense of peace    Sense of Community and or Yazidi Affiliation:  Nondenominational   Values/Beliefs  Spiritual Requests During Hospitalization: I gave Elliott the anointing with his mother present in his room.I will be seeing him but giving him only a blessing because he is special needs.     Addressed Needs/Concerns and/or [place] Through:     Sacramental Encounters  Sacrament of Sick-Anointing: Anointed    Outcome:        Advance Directives:         Spiritual Care Annotation    Annotation:  I gave Elliott the anointing today. In the future, I will be giving him a blessing since he is a special needs patient. He also has a wonderful smile too!  Gregory Aggarwal

## 2025-03-18 NOTE — CONSULTS
Consults    Reason For Consult  Cecal Volvulus     History Of Present Illness  Elliott Hernandez is a 59 y.o. male presenting with syncopal episode from [place] ED. He was hypotensive on arrival. CT c/a/p suggestive of cecal volvulus. Lactic normal. He has Down Syndrome. Non-verbal at baseline. Sister at bedside is legal guardian. Patient is distended but appears in no acute distress. No reported nausea, vomiting. There is also rectal thickening on imaging. Chart reviewed. No recent endoscopy     Past Medical History  He has a past medical history of Hypoxemia (04/16/2015).    Surgical History  He has no past surgical history on file.     Social History  He reports that he has never smoked. He has never used smokeless tobacco. He reports that he does not drink alcohol and does not use drugs.    Family History  Family History   Problem Relation Name Age of Onset    No Known Problems Mother      No Known Problems Father          Allergies  Inhaled anesthetics (halogen based), Peas, Sulfa (sulfonamide antibiotics), and Chocolate hazelnut flavor    Review of Systems   Reason unable to perform ROS: Non Verbal Baseline.        Physical Exam  Vitals reviewed.   Constitutional:       General: He is awake.      Appearance: Normal appearance.   HENT:      Head: Normocephalic and atraumatic.      Mouth/Throat:      Mouth: Mucous membranes are dry.      Comments: Tongue Protruding, Dry   Cardiovascular:      Rate and Rhythm: Normal rate and regular rhythm.   Pulmonary:      Effort: Pulmonary effort is normal.      Breath sounds: Normal breath sounds.   Abdominal:      General: There is distension.      Palpations: Abdomen is soft.      Tenderness: There is no abdominal tenderness. There is no guarding.   Musculoskeletal:      Cervical back: Normal range of motion and neck supple.   Skin:     General: Skin is warm and dry.   Neurological:      Mental Status: He is alert. Mental status is at baseline.   Psychiatric:          "Attention and Perception: Attention and perception normal.         Mood and Affect: Mood normal.         Behavior: Behavior normal.          Last Recorded Vitals  Blood pressure 99/65, pulse 60, temperature 36.1 °C (97 °F), temperature source Temporal, resp. rate 13, height 1.499 m (4' 11\"), weight 65 kg (143 lb 3.2 oz), SpO2 95%.    Relevant Results  Results for orders placed or performed during the hospital encounter of 03/17/25 (from the past 24 hours)   CBC and Auto Differential   Result Value Ref Range    WBC 3.6 (L) 4.4 - 11.3 x10*3/uL    nRBC 0.0 0.0 - 0.0 /100 WBCs    RBC 4.70 4.50 - 5.90 x10*6/uL    Hemoglobin 15.1 13.5 - 17.5 g/dL    Hematocrit 45.5 41.0 - 52.0 %    MCV 97 80 - 100 fL    MCH 32.1 26.0 - 34.0 pg    MCHC 33.2 32.0 - 36.0 g/dL    RDW 15.0 (H) 11.5 - 14.5 %    Platelets 185 150 - 450 x10*3/uL    Neutrophils % 59.1 40.0 - 80.0 %    Immature Granulocytes %, Automated 2.8 (H) 0.0 - 0.9 %    Lymphocytes % 18.2 13.0 - 44.0 %    Monocytes % 17.1 2.0 - 10.0 %    Eosinophils % 1.1 0.0 - 6.0 %    Basophils % 1.7 0.0 - 2.0 %    Neutrophils Absolute 2.14 1.20 - 7.70 x10*3/uL    Immature Granulocytes Absolute, Automated 0.10 0.00 - 0.70 x10*3/uL    Lymphocytes Absolute 0.66 (L) 1.20 - 4.80 x10*3/uL    Monocytes Absolute 0.62 0.10 - 1.00 x10*3/uL    Eosinophils Absolute 0.04 0.00 - 0.70 x10*3/uL    Basophils Absolute 0.06 0.00 - 0.10 x10*3/uL   Comprehensive metabolic panel   Result Value Ref Range    Glucose 91 74 - 99 mg/dL    Sodium 142 136 - 145 mmol/L    Potassium 4.4 3.5 - 5.3 mmol/L    Chloride 105 98 - 107 mmol/L    Bicarbonate 29 21 - 32 mmol/L    Anion Gap 12 10 - 20 mmol/L    Urea Nitrogen 21 6 - 23 mg/dL    Creatinine 1.39 (H) 0.50 - 1.30 mg/dL    eGFR 58 (L) >60 mL/min/1.73m*2    Calcium 9.1 8.6 - 10.3 mg/dL    Albumin 3.6 3.4 - 5.0 g/dL    Alkaline Phosphatase 64 33 - 136 U/L    Total Protein 6.8 6.4 - 8.2 g/dL    AST 21 9 - 39 U/L    Bilirubin, Total 0.5 0.0 - 1.2 mg/dL    ALT 29 10 - 52 U/L "   Sars-CoV-2 and Influenza A/B PCR   Result Value Ref Range    Flu A Result Not Detected Not Detected    Flu B Result Not Detected Not Detected    Coronavirus 2019, PCR Not Detected Not Detected   Troponin I, High Sensitivity, Initial   Result Value Ref Range    Troponin I, High Sensitivity 3 0 - 20 ng/L   ECG 12 lead   Result Value Ref Range    Ventricular Rate 63 BPM    Atrial Rate 63 BPM    NV Interval 178 ms    QRS Duration 114 ms    QT Interval 424 ms    QTC Calculation(Bazett) 433 ms    P Axis 42 degrees    R Axis 7 degrees    T Axis 22 degrees    QRS Count 10 beats    Q Onset 216 ms    P Onset 147 ms    P Offset 167 ms    T Offset 428 ms    QTC Fredericia 431 ms   Troponin, High Sensitivity, 1 Hour   Result Value Ref Range    Troponin I, High Sensitivity 3 0 - 20 ng/L   Urinalysis with Reflex Microscopic   Result Value Ref Range    Color, Urine Light-Yellow Light-Yellow, Yellow, Dark-Yellow    Appearance, Urine Clear Clear    Specific Gravity, Urine 1.010 1.005 - 1.035    pH, Urine 6.5 5.0, 5.5, 6.0, 6.5, 7.0, 7.5, 8.0    Protein, Urine NEGATIVE NEGATIVE, 10 (TRACE), 20 (TRACE) mg/dL    Glucose, Urine Normal Normal mg/dL    Blood, Urine 0.1 (1+) (A) NEGATIVE mg/dL    Ketones, Urine NEGATIVE NEGATIVE mg/dL    Bilirubin, Urine NEGATIVE NEGATIVE mg/dL    Urobilinogen, Urine Normal Normal mg/dL    Nitrite, Urine NEGATIVE NEGATIVE    Leukocyte Esterase, Urine NEGATIVE NEGATIVE   Microscopic Only, Urine   Result Value Ref Range    WBC, Urine 1-5 1-5, NONE /HPF    RBC, Urine 6-10 (A) NONE, 1-2, 3-5 /HPF    Mucus, Urine FEW Reference range not established. /LPF   Lactate   Result Value Ref Range    Lactate 0.7 0.4 - 2.0 mmol/L     CT chest abdomen pelvis w IV contrast    Result Date: 3/17/2025  Interpreted By:  Masoud Tejada, STUDY: CT CHEST ABDOMEN PELVIS W IV CONTRAST;  3/17/2025 7:46 pm   INDICATION: Signs/Symptoms:free air right hemidiaphragm.     COMPARISON: Same day CXR   ACCESSION NUMBER(S): [id number]    ORDERING CLINICIAN: CASSIE REIS   TECHNIQUE: Contiguous axial images of the chest, abdomen, and pelvis were obtained after the intravenous administration of iodinated contrast. Coronal and sagittal reformatted images were reconstructed from the axial data.   FINDINGS:   CT CHEST:   MEDIASTINUM AND LYMPH NODES: The esophagus is diffusely distended with air. No sizable esophageal reflux. The esophageal wall is normal in thickness.  No enlarged intrathoracic or axillary lymph nodes by imaging criteria. No pneumomediastinum.   VESSELS:  Normal caliber thoracic aorta without dissection. Minimal aortic atherosclerosis.   HEART: Normal size.  No coronary artery calcifications. Trace pericardial effusion.   LUNG, AIRWAYS, PLEURA: There is diffuse bilateral bronchial thickening. There is peribronchovascular ground-glass opacity in the right upper lobe, right middle lobe, left lower lobe, and right lower lobe, most pronounced within the right lower lobe with areas of subsegmental right lower lobe atelectasis also noted. There is interlobular septal thickening most pronounced in the right lower lobe. No pleural effusions or pneumothorax. There is a small amount of debris in the upper trachea.   OSSEOUS STRUCTURES: No acute osseous abnormality. Moderate right and mild left glenohumeral joint osteoarthrosis. Severe left acromioclavicular joint and mild right acromioclavicular joint osteoarthrosis.   CHEST WALL SOFT TISSUES: Generalized anasarca.     ABDOMEN/PELVIS:   ABDOMINAL WALL: No significant abnormality.   LIVER: No significant parenchymal abnormality.   BILE DUCTS: No significant intrahepatic or extrahepatic dilatation.   GALLBLADDER: No significant abnormality.   PANCREAS: No significant abnormality.   SPLEEN: Capsular calcification noted. The spleen is normal in size. No suspicious lesion.   ADRENALS: No significant abnormality.   KIDNEYS, URETERS, BLADDER: There is a right pelvic kidney. There is a simple cyst in the  pelvic kidney. There is no hydronephrosis bilaterally or renal/ureteral calculus. The bladder wall appears slightly thickened for degree of distention.   REPRODUCTIVE ORGANS: No significant abnormality.   VESSELS: No significant abnormality.   RETROPERITONEUM/LYMPH NODES: No acute retroperitoneal abnormality. No enlarged lymph nodes.   BOWEL/PERITONEUM: There is swirling of the right lower quadrant mesentery. There swirling of the right lower quadrant mesentery and 360 degree twisting of the ascending colon on its axis consistent with cecal volvulus. There is mild upstream dilatation of the ileal loops. There is circumferential wall thickening of the distal rectum with subtle perirectal stranding.   No ascites, free air, or fluid collection. There is diffuse mesenteric edema.     MUSCULOSKELETAL: No acute osseous abnormality.  No suspicious osseous lesion. Multilevel moderate to severe facet arthropathy in the lower lumbar spine.       1. Swirling of the right lower quadrant mesentery and 360 degree twisting of the ascending colon on its axis consistent with cecal volvulus. There is mild upstream dilatation of the ileal loops, which could relate to early/partial bowel obstruction. Surgical consultation recommended.   2. Circumferential wall thickening only involving the distal rectum with subtle surrounding fat stranding. Differential diagnosis includes proctitis and rectal malignancy. Recommend consultation with GI or colorectal surgery for further diagnostic assessment.   3. Diffuse bilateral bronchial wall thickening, bilateral peribronchovascular ground-glass opacities and interlobular septal thickening in the right > lower lobes are findings that are compatible pulmonary edema; however, given the debris in the trachea and dilated esophagus, infectious bronchitis is not excluded in the appropriate clinical context.   4. No free intraperitoneal air.   MACRO: Masoud Tejada discussed the significance and urgency  of this critical finding by telephone with  CASSIE REIS on 3/17/2025 at 8:20 pm.  (**-RCF-**) Findings:  See findings.   Signed by: Masoud Tejada 3/17/2025 8:20 PM Dictation workstation:   [id number]    ECG 12 lead    Result Date: 3/17/2025  Atrial-paced rhythm Possible Anterior infarct , age undetermined Abnormal ECG When compared with ECG of 20-OCT-2024 00:42, Electronic atrial pacemaker has replaced Sinus rhythm Nonspecific T wave abnormality, improved in Inferior leads    XR chest 1 view    Result Date: 3/17/2025  Interpreted By:  Isaac Schmitt, STUDY: XR CHEST 1 VIEW  3/17/2025 3:04 pm   INDICATION: Signs/Symptoms:SOB   COMPARISON: 03/08/2025   ACCESSION NUMBER(S): [id number]   ORDERING CLINICIAN: CASSIE REIS   TECHNIQUE: A single AP portable radiograph of the chest was obtained.   FINDINGS: A 2 lead pacer is seen over the left chest. Hazy consolidation is seen at the right lung base, and may represent atelectasis and/or pneumonia.  No pneumothorax is identified. The cardiac silhouette is within normal limits for size. Free intraperitoneal air is seen under the left hemidiaphragm.       1. Right basilar airspace consolidation, as above. Clinical correlation and continued follow-up until clearing is recommended. 2. Free air under the right hemidiaphragm, as above.   MACRO: None.   Signed by: Isaac Schmitt 3/17/2025 3:14 PM Dictation workstation:   [id number]    ECG 12 lead    Result Date: 3/10/2025  1 Poor data quality, interpretation may be adversely affected Undetermined rhythm Nonspecific intraventricular conduction delay ST elevation, consider lateral injury or acute infarct ** ** ACUTE MI / STEMI ** ** Abnormal ECG When compared with ECG of 20-OCT-2024 00:42, Current undetermined rhythm precludes rhythm comparison, needs review Nonspecific intraventricular conduction delay has replaced Incomplete left bundle branch block    XR chest 2 views    Result Date: 3/8/2025  Interpreted By:  Jr Bhatti,  STUDY: XR CHEST 2 VIEWS;  3/8/2025 6:45 pm   INDICATION: Signs/Symptoms:cough.   COMPARISON: 12/12/2024   ACCESSION NUMBER(S): [id number]   ORDERING CLINICIAN: TYSHAWN SORENSON   FINDINGS: Heart size within normal limits. Left-sided pacemaker redemonstrated.   Some subtle opacity right infrahilar region may reflect atelectasis or pneumonia. Left lung clear. No pneumothorax.       Subtle right infrahilar infiltrate or atelectasis.   MACRO: None   Signed by: Jr Bhatti 3/8/2025 7:18 PM Dictation workstation:   [id number]        Assessment/Plan     Abnormal CT, Cecal Volvulus, Syncope   CT a/p suggestive cecal volvulus. He is distended but appears comfortable. Non-verbal at baseline. Lactic is normal. Reviewed in detail with Dr Miles, he advised against colonoscopy as there is high risk of perforation. He is recommending surgical evaluation. Barium enema possible option; however, often unsuccessful (not sure the patient would be able to tolerate with baseline mentation?) and still with risk of perforation     I spent 30 minutes in the professional and overall care of this patient.

## 2025-03-19 ENCOUNTER — APPOINTMENT (OUTPATIENT)
Dept: CARDIOLOGY | Facility: CLINIC | Age: 60
DRG: 392 | End: 2025-03-19
Payer: MEDICARE

## 2025-03-19 ENCOUNTER — APPOINTMENT (OUTPATIENT)
Dept: RADIOLOGY | Facility: HOSPITAL | Age: 60
DRG: 392 | End: 2025-03-19
Payer: MEDICARE

## 2025-03-19 LAB
ALBUMIN SERPL BCP-MCNC: 3.1 G/DL (ref 3.4–5)
ALP SERPL-CCNC: 51 U/L (ref 33–136)
ALT SERPL W P-5'-P-CCNC: 23 U/L (ref 10–52)
ANION GAP SERPL CALCULATED.3IONS-SCNC: 8 MMOL/L (ref 10–20)
AST SERPL W P-5'-P-CCNC: 24 U/L (ref 9–39)
ATRIAL RATE: 63 BPM
BILIRUB SERPL-MCNC: 0.7 MG/DL (ref 0–1.2)
BUN SERPL-MCNC: 11 MG/DL (ref 6–23)
CALCIUM SERPL-MCNC: 8.6 MG/DL (ref 8.6–10.3)
CHLORIDE SERPL-SCNC: 109 MMOL/L (ref 98–107)
CO2 SERPL-SCNC: 25 MMOL/L (ref 21–32)
CREAT SERPL-MCNC: 1.17 MG/DL (ref 0.5–1.3)
EGFRCR SERPLBLD CKD-EPI 2021: 71 ML/MIN/1.73M*2
ERYTHROCYTE [DISTWIDTH] IN BLOOD BY AUTOMATED COUNT: 15.3 % (ref 11.5–14.5)
GLUCOSE SERPL-MCNC: 84 MG/DL (ref 74–99)
HCT VFR BLD AUTO: 43.6 % (ref 41–52)
HGB BLD-MCNC: 14.8 G/DL (ref 13.5–17.5)
MAGNESIUM SERPL-MCNC: 1.86 MG/DL (ref 1.6–2.4)
MCH RBC QN AUTO: 32.2 PG (ref 26–34)
MCHC RBC AUTO-ENTMCNC: 33.9 G/DL (ref 32–36)
MCV RBC AUTO: 95 FL (ref 80–100)
NRBC BLD-RTO: 0 /100 WBCS (ref 0–0)
P AXIS: 42 DEGREES
P OFFSET: 167 MS
P ONSET: 147 MS
PLATELET # BLD AUTO: 180 X10*3/UL (ref 150–450)
POTASSIUM SERPL-SCNC: 4.1 MMOL/L (ref 3.5–5.3)
PR INTERVAL: 178 MS
PROT SERPL-MCNC: 6.2 G/DL (ref 6.4–8.2)
Q ONSET: 216 MS
QRS COUNT: 10 BEATS
QRS DURATION: 114 MS
QT INTERVAL: 424 MS
QTC CALCULATION(BAZETT): 433 MS
QTC FREDERICIA: 431 MS
R AXIS: 7 DEGREES
RBC # BLD AUTO: 4.59 X10*6/UL (ref 4.5–5.9)
SODIUM SERPL-SCNC: 138 MMOL/L (ref 136–145)
T AXIS: 22 DEGREES
T OFFSET: 428 MS
VENTRICULAR RATE: 63 BPM
WBC # BLD AUTO: 3.4 X10*3/UL (ref 4.4–11.3)

## 2025-03-19 PROCEDURE — 2500000001 HC RX 250 WO HCPCS SELF ADMINISTERED DRUGS (ALT 637 FOR MEDICARE OP)

## 2025-03-19 PROCEDURE — A9698 NON-RAD CONTRAST MATERIALNOC: HCPCS | Performed by: SURGERY

## 2025-03-19 PROCEDURE — 2060000001 HC INTERMEDIATE ICU ROOM DAILY

## 2025-03-19 PROCEDURE — 84075 ASSAY ALKALINE PHOSPHATASE: CPT

## 2025-03-19 PROCEDURE — 74176 CT ABD & PELVIS W/O CONTRAST: CPT

## 2025-03-19 PROCEDURE — 36415 COLL VENOUS BLD VENIPUNCTURE: CPT

## 2025-03-19 PROCEDURE — 74176 CT ABD & PELVIS W/O CONTRAST: CPT | Performed by: RADIOLOGY

## 2025-03-19 PROCEDURE — 85027 COMPLETE CBC AUTOMATED: CPT

## 2025-03-19 PROCEDURE — 2550000001 HC RX 255 CONTRASTS: Performed by: SURGERY

## 2025-03-19 PROCEDURE — 83735 ASSAY OF MAGNESIUM: CPT

## 2025-03-19 PROCEDURE — 2500000004 HC RX 250 GENERAL PHARMACY W/ HCPCS (ALT 636 FOR OP/ED): Performed by: SURGERY

## 2025-03-19 PROCEDURE — 2500000004 HC RX 250 GENERAL PHARMACY W/ HCPCS (ALT 636 FOR OP/ED)

## 2025-03-19 RX ORDER — MAGNESIUM SULFATE HEPTAHYDRATE 40 MG/ML
2 INJECTION, SOLUTION INTRAVENOUS ONCE
Status: DISCONTINUED | OUTPATIENT
Start: 2025-03-19 | End: 2025-03-19

## 2025-03-19 RX ORDER — ENOXAPARIN SODIUM 100 MG/ML
40 INJECTION SUBCUTANEOUS DAILY
Status: DISCONTINUED | OUTPATIENT
Start: 2025-03-19 | End: 2025-03-25 | Stop reason: HOSPADM

## 2025-03-19 RX ADMIN — IOHEXOL 500 ML: 12 SOLUTION ORAL at 13:10

## 2025-03-19 RX ADMIN — FAMOTIDINE 20 MG: 20 TABLET, FILM COATED ORAL at 08:27

## 2025-03-19 RX ADMIN — ATORVASTATIN CALCIUM 40 MG: 40 TABLET, FILM COATED ORAL at 20:55

## 2025-03-19 RX ADMIN — SODIUM CHLORIDE, SODIUM LACTATE, POTASSIUM CHLORIDE, AND CALCIUM CHLORIDE 75 ML/HR: 600; 310; 30; 20 INJECTION, SOLUTION INTRAVENOUS at 00:54

## 2025-03-19 RX ADMIN — FAMOTIDINE 20 MG: 20 TABLET, FILM COATED ORAL at 20:55

## 2025-03-19 RX ADMIN — ENOXAPARIN SODIUM 40 MG: 40 INJECTION SUBCUTANEOUS at 15:13

## 2025-03-19 RX ADMIN — CETIRIZINE HYDROCHLORIDE 10 MG: 10 TABLET, FILM COATED ORAL at 08:27

## 2025-03-19 RX ADMIN — POLYETHYLENE GLYCOL (3350) 17 G: 17 POWDER, FOR SOLUTION ORAL at 08:27

## 2025-03-19 SDOH — ECONOMIC STABILITY: HOUSING INSECURITY: AT ANY TIME IN THE PAST 12 MONTHS, WERE YOU HOMELESS OR LIVING IN A SHELTER (INCLUDING NOW)?: NO

## 2025-03-19 SDOH — HEALTH STABILITY: MENTAL HEALTH: HOW OFTEN DO YOU HAVE A DRINK CONTAINING ALCOHOL?: NEVER

## 2025-03-19 SDOH — ECONOMIC STABILITY: INCOME INSECURITY: IN THE PAST 12 MONTHS HAS THE ELECTRIC, GAS, OIL, OR WATER COMPANY THREATENED TO SHUT OFF SERVICES IN YOUR HOME?: NO

## 2025-03-19 SDOH — SOCIAL STABILITY: SOCIAL INSECURITY: WITHIN THE LAST YEAR, HAVE YOU BEEN AFRAID OF YOUR PARTNER OR EX-PARTNER?: NO

## 2025-03-19 SDOH — HEALTH STABILITY: MENTAL HEALTH: HOW OFTEN DO YOU HAVE SIX OR MORE DRINKS ON ONE OCCASION?: NEVER

## 2025-03-19 SDOH — ECONOMIC STABILITY: FOOD INSECURITY: WITHIN THE PAST 12 MONTHS, THE FOOD YOU BOUGHT JUST DIDN'T LAST AND YOU DIDN'T HAVE MONEY TO GET MORE.: NEVER TRUE

## 2025-03-19 SDOH — ECONOMIC STABILITY: TRANSPORTATION INSECURITY: IN THE PAST 12 MONTHS, HAS LACK OF TRANSPORTATION KEPT YOU FROM MEDICAL APPOINTMENTS OR FROM GETTING MEDICATIONS?: NO

## 2025-03-19 SDOH — HEALTH STABILITY: PHYSICAL HEALTH: ON AVERAGE, HOW MANY DAYS PER WEEK DO YOU ENGAGE IN MODERATE TO STRENUOUS EXERCISE (LIKE A BRISK WALK)?: 0 DAYS

## 2025-03-19 SDOH — HEALTH STABILITY: PHYSICAL HEALTH: ON AVERAGE, HOW MANY MINUTES DO YOU ENGAGE IN EXERCISE AT THIS LEVEL?: 0 MIN

## 2025-03-19 SDOH — SOCIAL STABILITY: SOCIAL INSECURITY: WITHIN THE LAST YEAR, HAVE YOU BEEN HUMILIATED OR EMOTIONALLY ABUSED IN OTHER WAYS BY YOUR PARTNER OR EX-PARTNER?: NO

## 2025-03-19 SDOH — SOCIAL STABILITY: SOCIAL INSECURITY: ARE YOU MARRIED, WIDOWED, DIVORCED, SEPARATED, NEVER MARRIED, OR LIVING WITH A PARTNER?: MARRIED

## 2025-03-19 SDOH — SOCIAL STABILITY: SOCIAL INSECURITY: ARE YOU MARRIED, WIDOWED, DIVORCED, SEPARATED, NEVER MARRIED, OR LIVING WITH A PARTNER?: NEVER MARRIED

## 2025-03-19 SDOH — ECONOMIC STABILITY: HOUSING INSECURITY: IN THE LAST 12 MONTHS, WAS THERE A TIME WHEN YOU WERE NOT ABLE TO PAY THE MORTGAGE OR RENT ON TIME?: NO

## 2025-03-19 SDOH — SOCIAL STABILITY: SOCIAL NETWORK: HOW OFTEN DO YOU ATTEND MEETINGS OF THE CLUBS OR ORGANIZATIONS YOU BELONG TO?: NEVER

## 2025-03-19 SDOH — ECONOMIC STABILITY: FOOD INSECURITY: WITHIN THE PAST 12 MONTHS, YOU WORRIED THAT YOUR FOOD WOULD RUN OUT BEFORE YOU GOT THE MONEY TO BUY MORE.: NEVER TRUE

## 2025-03-19 SDOH — HEALTH STABILITY: MENTAL HEALTH
DO YOU FEEL STRESS - TENSE, RESTLESS, NERVOUS, OR ANXIOUS, OR UNABLE TO SLEEP AT NIGHT BECAUSE YOUR MIND IS TROUBLED ALL THE TIME - THESE DAYS?: NOT AT ALL

## 2025-03-19 SDOH — ECONOMIC STABILITY: HOUSING INSECURITY: IN THE PAST 12 MONTHS, HOW MANY TIMES HAVE YOU MOVED WHERE YOU WERE LIVING?: 0

## 2025-03-19 SDOH — HEALTH STABILITY: PHYSICAL HEALTH
HOW OFTEN DO YOU NEED TO HAVE SOMEONE HELP YOU WHEN YOU READ INSTRUCTIONS, PAMPHLETS, OR OTHER WRITTEN MATERIAL FROM YOUR DOCTOR OR PHARMACY?: NEVER

## 2025-03-19 SDOH — SOCIAL STABILITY: SOCIAL NETWORK: HOW OFTEN DO YOU ATTEND MEETINGS OF THE CLUBS OR ORGANIZATIONS YOU BELONG TO?: MORE THAN 4 TIMES PER YEAR

## 2025-03-19 SDOH — SOCIAL STABILITY: SOCIAL NETWORK
DO YOU BELONG TO ANY CLUBS OR ORGANIZATIONS SUCH AS CHURCH GROUPS, UNIONS, FRATERNAL OR ATHLETIC GROUPS, OR SCHOOL GROUPS?: YES

## 2025-03-19 SDOH — ECONOMIC STABILITY: FOOD INSECURITY: HOW HARD IS IT FOR YOU TO PAY FOR THE VERY BASICS LIKE FOOD, HOUSING, MEDICAL CARE, AND HEATING?: NOT HARD AT ALL

## 2025-03-19 SDOH — SOCIAL STABILITY: SOCIAL NETWORK: HOW OFTEN DO YOU GET TOGETHER WITH FRIENDS OR RELATIVES?: MORE THAN THREE TIMES A WEEK

## 2025-03-19 SDOH — SOCIAL STABILITY: SOCIAL NETWORK: HOW OFTEN DO YOU ATTEND CHURCH OR RELIGIOUS SERVICES?: NEVER

## 2025-03-19 SDOH — HEALTH STABILITY: MENTAL HEALTH: HOW MANY DRINKS CONTAINING ALCOHOL DO YOU HAVE ON A TYPICAL DAY WHEN YOU ARE DRINKING?: PATIENT DOES NOT DRINK

## 2025-03-19 SDOH — SOCIAL STABILITY: SOCIAL NETWORK: HOW OFTEN DO YOU ATTEND CHURCH OR RELIGIOUS SERVICES?: MORE THAN 4 TIMES PER YEAR

## 2025-03-19 SDOH — SOCIAL STABILITY: SOCIAL NETWORK: IN A TYPICAL WEEK, HOW MANY TIMES DO YOU TALK ON THE PHONE WITH FAMILY, FRIENDS, OR NEIGHBORS?: NEVER

## 2025-03-19 SDOH — SOCIAL STABILITY: SOCIAL NETWORK: HOW OFTEN DO YOU GET TOGETHER WITH FRIENDS OR RELATIVES?: THREE TIMES A WEEK

## 2025-03-19 ASSESSMENT — COGNITIVE AND FUNCTIONAL STATUS - GENERAL
PERSONAL GROOMING: TOTAL
MOVING TO AND FROM BED TO CHAIR: A LOT
TOILETING: TOTAL
MOBILITY SCORE: 6
STANDING UP FROM CHAIR USING ARMS: TOTAL
DRESSING REGULAR UPPER BODY CLOTHING: A LOT
DRESSING REGULAR UPPER BODY CLOTHING: TOTAL
DRESSING REGULAR LOWER BODY CLOTHING: TOTAL
STANDING UP FROM CHAIR USING ARMS: A LOT
PERSONAL GROOMING: A LOT
PERSONAL GROOMING: TOTAL
WALKING IN HOSPITAL ROOM: TOTAL
DRESSING REGULAR LOWER BODY CLOTHING: A LOT
DAILY ACTIVITIY SCORE: 6
MOVING FROM LYING ON BACK TO SITTING ON SIDE OF FLAT BED WITH BEDRAILS: TOTAL
MOVING FROM LYING ON BACK TO SITTING ON SIDE OF FLAT BED WITH BEDRAILS: A LOT
DRESSING REGULAR UPPER BODY CLOTHING: TOTAL
TURNING FROM BACK TO SIDE WHILE IN FLAT BAD: TOTAL
EATING MEALS: TOTAL
WALKING IN HOSPITAL ROOM: TOTAL
TURNING FROM BACK TO SIDE WHILE IN FLAT BAD: TOTAL
HELP NEEDED FOR BATHING: TOTAL
WALKING IN HOSPITAL ROOM: TOTAL
TURNING FROM BACK TO SIDE WHILE IN FLAT BAD: A LOT
DRESSING REGULAR LOWER BODY CLOTHING: TOTAL
STANDING UP FROM CHAIR USING ARMS: TOTAL
EATING MEALS: TOTAL
MOBILITY SCORE: 10
MOVING TO AND FROM BED TO CHAIR: TOTAL
EATING MEALS: A LOT
TOILETING: A LOT
MOVING TO AND FROM BED TO CHAIR: TOTAL
DAILY ACTIVITIY SCORE: 12
DAILY ACTIVITIY SCORE: 6
HELP NEEDED FOR BATHING: TOTAL
TOILETING: TOTAL
MOBILITY SCORE: 6
CLIMB 3 TO 5 STEPS WITH RAILING: TOTAL
CLIMB 3 TO 5 STEPS WITH RAILING: TOTAL
HELP NEEDED FOR BATHING: A LOT
MOVING FROM LYING ON BACK TO SITTING ON SIDE OF FLAT BED WITH BEDRAILS: TOTAL
CLIMB 3 TO 5 STEPS WITH RAILING: TOTAL

## 2025-03-19 ASSESSMENT — LIFESTYLE VARIABLES
AUDIT-C TOTAL SCORE: 0
AUDIT-C TOTAL SCORE: 0
SKIP TO QUESTIONS 9-10: 1
SKIP TO QUESTIONS 9-10: 1

## 2025-03-19 ASSESSMENT — PAIN SCALES - GENERAL
PAINLEVEL_OUTOF10: 0 - NO PAIN

## 2025-03-19 ASSESSMENT — PAIN - FUNCTIONAL ASSESSMENT
PAIN_FUNCTIONAL_ASSESSMENT: FLACC (FACE, LEGS, ACTIVITY, CRY, CONSOLABILITY)
PAIN_FUNCTIONAL_ASSESSMENT: 0-10
PAIN_FUNCTIONAL_ASSESSMENT: FLACC (FACE, LEGS, ACTIVITY, CRY, CONSOLABILITY)
PAIN_FUNCTIONAL_ASSESSMENT: FLACC (FACE, LEGS, ACTIVITY, CRY, CONSOLABILITY)

## 2025-03-19 ASSESSMENT — ACTIVITIES OF DAILY LIVING (ADL)
LACK_OF_TRANSPORTATION: NO

## 2025-03-19 ASSESSMENT — PAIN SCALES - WONG BAKER: WONGBAKER_NUMERICALRESPONSE: NO HURT

## 2025-03-19 NOTE — PROGRESS NOTES
03/19/25 1424   [facility] Disability Status   Are you deaf or do you have serious difficulty hearing? N   Are you blind or do you have serious difficulty seeing, even when wearing glasses? N   Because of a physical, mental, or emotional condition, do you have serious difficulty concentrating, remembering, or making decisions? (5 years old or older) Y   Do you have serious difficulty walking or climbing stairs? Y   Do you have serious difficulty dressing or bathing? Y   Because of a physical, mental, or emotional condition, do you have serious difficulty doing errands alone such as visiting the doctor? Y

## 2025-03-19 NOTE — PROGRESS NOTES
03/19/25 1422   Discharge Planning   Living Arrangements Other (Comment)  (pt lives in a residential home)   Support Systems Family members   Type of Residence Group home   Number of Stairs Within Residence 0   Do you have animals or pets at home? No   Care Facility Name Caretaker said that he lives in a residential home-3 people total with 24/7 care--not a group home   Who is requesting discharge planning? Provider   Home or Post Acute Services None   Expected Discharge Disposition Home   Does the patient need discharge transport arranged? No   Financial Resource Strain   How hard is it for you to pay for the very basics like food, housing, medical care, and heating? Not hard   Housing Stability   In the last 12 months, was there a time when you were not able to pay the mortgage or rent on time? N   In the past 12 months, how many times have you moved where you were living? 0   At any time in the past 12 months, were you homeless or living in a shelter (including now)? N   Transportation Needs   In the past 12 months, has lack of transportation kept you from medical appointments or from getting medications? no   In the past 12 months, has lack of transportation kept you from meetings, work, or from getting things needed for daily living? No   Patient Choice   Patient / Family choosing to utilize agency / facility established prior to hospitalization No   Stroke Family Assessment   Stroke Family Assessment Needed No

## 2025-03-19 NOTE — NURSING NOTE
Had large bowel movement, Soft in consistency and brown in color. Care rendered.  Pain not noted. Assessment the same.

## 2025-03-19 NOTE — CARE PLAN
Problem: Pain - Adult  Goal: Verbalizes/displays adequate comfort level or baseline comfort level  Outcome: Progressing     Problem: Safety - Adult  Goal: Free from fall injury  Outcome: Progressing     Problem: Discharge Planning  Goal: Discharge to home or other facility with appropriate resources  Outcome: Progressing     Problem: Chronic Conditions and Co-morbidities  Goal: Patient's chronic conditions and co-morbidity symptoms are monitored and maintained or improved  Outcome: Progressing     Problem: Nutrition  Goal: Nutrient intake appropriate for maintaining nutritional needs  Outcome: Progressing     Problem: Skin  Goal: Decreased wound size/increased tissue granulation at next dressing change  Outcome: Progressing  Goal: Participates in plan/prevention/treatment measures  Outcome: Progressing  Goal: Prevent/manage excess moisture  Outcome: Progressing  Goal: Prevent/minimize sheer/friction injuries  Outcome: Progressing  Goal: Promote/optimize nutrition  Outcome: Progressing  Goal: Promote skin healing  Outcome: Progressing   The patient's goals for the shift include      The clinical goals for the shift include maintain safety    O

## 2025-03-19 NOTE — PROGRESS NOTES
03/19/25 1420   Physical Activity   On average, how many days per week do you engage in moderate to strenuous exercise (like a brisk walk)? 0 days   On average, how many minutes do you engage in exercise at this level? 0 min   Financial Resource Strain   How hard is it for you to pay for the very basics like food, housing, medical care, and heating? Not hard   Housing Stability   In the last 12 months, was there a time when you were not able to pay the mortgage or rent on time? N   In the past 12 months, how many times have you moved where you were living? 0   At any time in the past 12 months, were you homeless or living in a shelter (including now)? N   Transportation Needs   In the past 12 months, has lack of transportation kept you from medical appointments or from getting medications? no   In the past 12 months, has lack of transportation kept you from meetings, work, or from getting things needed for daily living? No   Food Insecurity   Within the past 12 months, you worried that your food would run out before you got the money to buy more. Never true   Within the past 12 months, the food you bought just didn't last and you didn't have money to get more. Never true   Stress   Do you feel stress - tense, restless, nervous, or anxious, or unable to sleep at night because your mind is troubled all the time - these days? Not at all  (Per caretaker, he gets up alot at night to see what the staff is doing; unsure if he is anxious. Pt is non verbal)   Social Connections   In a typical week, how many times do you talk on the phone with family, friends, or neighbors? Never  (pt is non verbal)   How often do you get together with friends or relatives? Three times   How often do you attend Latter day or Orthodox services? Never   Do you belong to any clubs or organizations such as Latter day groups, unions, fraternal or athletic groups, or school groups? No   How often do you attend meetings of the clubs or organizations you  belong to? Never   Are you , , , , never , or living with a partner? Never marrie   Intimate Partner Violence   Within the last year, have you been afraid of your partner or ex-partner? No   Within the last year, have you been humiliated or emotionally abused in other ways by your partner or ex-partner? No   Within the last year, have you been kicked, hit, slapped, or otherwise physically hurt by your partner or ex-partner? No   Within the last year, have you been raped or forced to have any kind of sexual activity by your partner or ex-partner? No   Alcohol Use   Q1: How often do you have a drink containing alcohol? Never   Q2: How many drinks containing alcohol do you have on a typical day when you are drinking? None   Q3: How often do you have six or more drinks on one occasion? Never   Utilities   In the past 12 months has the electric, gas, oil, or water company threatened to shut off services in your home? No   Health Literacy   How often do you need to have someone help you when you read instructions, pamphlets, or other written material from your doctor or pharmacy? Always

## 2025-03-19 NOTE — NURSING NOTE
Assuming care of patient, Repositioned. Report done, Family at bedside. Awake, Calm and quiet on bed. Assessed and charted, No complaint made. Will monitor.

## 2025-03-19 NOTE — CARE PLAN
Problem: Pain - Adult  Goal: Verbalizes/displays adequate comfort level or baseline comfort level  3/19/2025 0030 by Daniela Wetzel RN  Outcome: Progressing  3/19/2025 0030 by Daniela Wetzel RN  Outcome: Progressing     Problem: Safety - Adult  Goal: Free from fall injury  3/19/2025 0030 by Daniela Wetzel RN  Outcome: Progressing  3/19/2025 0030 by Daniela Wetzel RN  Outcome: Progressing     Problem: Discharge Planning  Goal: Discharge to home or other facility with appropriate resources  3/19/2025 0030 by Daniela Wetzel RN  Outcome: Progressing  3/19/2025 0030 by Daniela Wetzel RN  Outcome: Progressing     Problem: Chronic Conditions and Co-morbidities  Goal: Patient's chronic conditions and co-morbidity symptoms are monitored and maintained or improved  3/19/2025 0030 by Daniela Wetzel RN  Outcome: Progressing  3/19/2025 0030 by Daniela Wetzel RN  Outcome: Progressing     Problem: Nutrition  Goal: Nutrient intake appropriate for maintaining nutritional needs  3/19/2025 0030 by Daniela Wetzel RN  Outcome: Progressing  3/19/2025 0030 by Daniela Wetzel RN  Outcome: Progressing     Problem: Skin  Goal: Decreased wound size/increased tissue granulation at next dressing change  3/19/2025 0030 by Daniela Wetzel RN  Outcome: Progressing  Flowsheets (Taken 3/18/2025 2020)  Decreased wound size/increased tissue granulation at next dressing change: Protective dressings over bony prominences  3/19/2025 0030 by Daniela Wetzel RN  Outcome: Progressing  Flowsheets (Taken 3/18/2025 2020)  Decreased wound size/increased tissue granulation at next dressing change: Protective dressings over bony prominences  Goal: Participates in plan/prevention/treatment measures  3/19/2025 0030 by Daniela Wetzel RN  Outcome: Progressing  Flowsheets (Taken 3/18/2025 2020)  Participates in plan/prevention/treatment measures:    Discuss with provider PT/OT consult   Elevate heels  3/19/2025 0030 by Daniela Wetzel RN  Outcome: Progressing  Flowsheets (Taken 3/18/2025 2020)  Participates in plan/prevention/treatment measures:   Discuss with provider PT/OT consult   Elevate heels  Goal: Prevent/manage excess moisture  3/19/2025 0030 by Daniela Wetzel RN  Outcome: Progressing  Flowsheets (Taken 3/18/2025 2020)  Prevent/manage excess moisture:   Cleanse incontinence/protect with barrier cream   Moisturize dry skin  3/19/2025 0030 by Daniela Wetzel RN  Outcome: Progressing  Flowsheets (Taken 3/18/2025 2020)  Prevent/manage excess moisture:   Cleanse incontinence/protect with barrier cream   Moisturize dry skin  Goal: Prevent/minimize sheer/friction injuries  3/19/2025 0030 by Daniela Wetzel RN  Outcome: Progressing  Flowsheets (Taken 3/18/2025 2020)  Prevent/minimize sheer/friction injuries:   HOB 30 degrees or less   Turn/reposition every 2 hours/use positioning/transfer devices  3/19/2025 0030 by Daniela Wetzel RN  Outcome: Progressing  Flowsheets (Taken 3/18/2025 2020)  Prevent/minimize sheer/friction injuries:   HOB 30 degrees or less   Turn/reposition every 2 hours/use positioning/transfer devices  Goal: Promote/optimize nutrition  3/19/2025 0030 by Daniela Wetzel RN  Outcome: Progressing  Flowsheets (Taken 3/18/2025 2020)  Promote/optimize nutrition:   Assist with feeding   Monitor/record intake including meals  3/19/2025 0030 by Daniela Wetzel RN  Outcome: Progressing  Flowsheets (Taken 3/18/2025 2020)  Promote/optimize nutrition:   Assist with feeding   Monitor/record intake including meals  Goal: Promote skin healing  3/19/2025 0030 by Daniela Wetzel RN  Outcome: Progressing  Flowsheets (Taken 3/18/2025 2020)  Promote skin healing:   Assess skin/pad under line(s)/device(s)   Protective dressings over bony prominences   Turn/reposition every 2 hours/use  positioning/transfer devices  3/19/2025 0030 by Daniela Wetzel RN  Outcome: Progressing  Flowsheets (Taken 3/18/2025 2020)  Promote skin healing:   Assess skin/pad under line(s)/device(s)   Protective dressings over bony prominences   Turn/reposition every 2 hours/use positioning/transfer devices   The patient's goals for the shift include      The clinical goals for the shift include to get HDS    Over the shift, the patient did not make progress toward the following goals. Barriers to progression include . Recommendations to address these barriers include .

## 2025-03-19 NOTE — PROGRESS NOTES
"Cyril Hernandez \"Elliott\" is a 59 y.o. male on day 1 of admission presenting with Volvulus (Multi).    Subjective   Had multiple BMs overnight.  No other changes.  Objective     Physical Exam  Vitals and nursing note reviewed.   Constitutional:       Appearance: Normal appearance.   HENT:      Head: Normocephalic and atraumatic.   Cardiovascular:      Rate and Rhythm: Normal rate and regular rhythm.   Pulmonary:      Effort: Pulmonary effort is normal. No respiratory distress.   Abdominal:      General: There is distension.      Palpations: Abdomen is soft.      Tenderness: There is no abdominal tenderness.      Comments: Increased distention from yesterday   Musculoskeletal:         General: No swelling or tenderness.   Skin:     General: Skin is warm and dry.   Neurological:      General: No focal deficit present.      Mental Status: He is alert. Mental status is at baseline.         Last Recorded Vitals  Blood pressure 105/65, pulse 60, temperature 36.1 °C (97 °F), temperature source Temporal, resp. rate 18, height 1.499 m (4' 11\"), weight 59 kg (130 lb 1.1 oz), SpO2 99%.  Intake/Output last 3 Shifts:  I/O last 3 completed shifts:  In: 2953 (50.1 mL/kg) [P.O.:720; I.V.:2233 (37.8 mL/kg)]  Out: 1500 (25.4 mL/kg) [Urine:1500 (0.7 mL/kg/hr)]  Weight: 59 kg     Relevant Results  Lab Results   Component Value Date    GLUCOSE 84 03/19/2025    CALCIUM 8.6 03/19/2025     03/19/2025    K 4.1 03/19/2025    CO2 25 03/19/2025     (H) 03/19/2025    BUN 11 03/19/2025    CREATININE 1.17 03/19/2025     Lab Results   Component Value Date    WBC 3.4 (L) 03/19/2025    HGB 14.8 03/19/2025    HCT 43.6 03/19/2025    MCV 95 03/19/2025     03/19/2025       Assessment/Plan   Assessment & Plan  Volvulus (Multi)    3/19: Patient completed bowel prep last night with multiple bowel movements.  Will check a repeat CT abdomen with oral contrast to further evaluate previously seen volvulus.  At this point patient does have " some increased distention in his abdomen however no evidence of any tenderness or pain during assessment.  Recheck labs today.  Will follow-up on her CT results later today.  Pt seen and examined with Dr. Eva OROZCO spent 20 minutes in the professional and overall care of this patient.      Ramonita Vaz, KRISTAL-CNP

## 2025-03-19 NOTE — PROGRESS NOTES
03/19/25 0829   Discharge Planning   Living Arrangements Spouse/significant other   Support Systems Spouse/significant other   Type of Residence Private residence   Number of Stairs to Enter Residence 4   Number of Stairs Within Residence 24   Who is requesting discharge planning? Provider   Home or Post Acute Services None   Expected Discharge Disposition Home   Does the patient need discharge transport arranged? No   Financial Resource Strain   How hard is it for you to pay for the very basics like food, housing, medical care, and heating? Not hard   Housing Stability   In the last 12 months, was there a time when you were not able to pay the mortgage or rent on time? N   In the past 12 months, how many times have you moved where you were living? 0   At any time in the past 12 months, were you homeless or living in a shelter (including now)? N   Transportation Needs   In the past 12 months, has lack of transportation kept you from medical appointments or from getting medications? no   In the past 12 months, has lack of transportation kept you from meetings, work, or from getting things needed for daily living? No   Patient Choice   Patient / Family choosing to utilize agency / facility established prior to hospitalization No   Stroke Family Assessment   Stroke Family Assessment Needed No

## 2025-03-19 NOTE — CARE PLAN
Problem: Pain - Adult  Goal: Verbalizes/displays adequate comfort level or baseline comfort level  Outcome: Progressing     Problem: Safety - Adult  Goal: Free from fall injury  Outcome: Progressing     Problem: Discharge Planning  Goal: Discharge to home or other facility with appropriate resources  Outcome: Progressing     Problem: Chronic Conditions and Co-morbidities  Goal: Patient's chronic conditions and co-morbidity symptoms are monitored and maintained or improved  Outcome: Progressing     Problem: Nutrition  Goal: Nutrient intake appropriate for maintaining nutritional needs  Outcome: Progressing     Problem: Skin  Goal: Decreased wound size/increased tissue granulation at next dressing change  Outcome: Progressing  Goal: Participates in plan/prevention/treatment measures  Outcome: Progressing  Goal: Prevent/manage excess moisture  Outcome: Progressing  Goal: Prevent/minimize sheer/friction injuries  Outcome: Progressing  Goal: Promote/optimize nutrition  Outcome: Progressing  Goal: Promote skin healing  Outcome: Progressing   The patient's goals for the shift include      The clinical goals for the shift include to get HDS    Over the shift, the patient did not make progress toward the following goals. Barriers to progression include . Recommendations to address these barriers include .

## 2025-03-19 NOTE — PROGRESS NOTES
03/19/25 0827   Physical Activity   On average, how many days per week do you engage in moderate to strenuous exercise (like a brisk walk)? 0 days   On average, how many minutes do you engage in exercise at this level? 0 min   Financial Resource Strain   How hard is it for you to pay for the very basics like food, housing, medical care, and heating? Not hard   Housing Stability   In the last 12 months, was there a time when you were not able to pay the mortgage or rent on time? N   In the past 12 months, how many times have you moved where you were living? 0   At any time in the past 12 months, were you homeless or living in a shelter (including now)? N   Transportation Needs   In the past 12 months, has lack of transportation kept you from medical appointments or from getting medications? no   In the past 12 months, has lack of transportation kept you from meetings, work, or from getting things needed for daily living? No   Food Insecurity   Within the past 12 months, you worried that your food would run out before you got the money to buy more. Never true   Within the past 12 months, the food you bought just didn't last and you didn't have money to get more. Never true   Stress   Do you feel stress - tense, restless, nervous, or anxious, or unable to sleep at night because your mind is troubled all the time - these days? Not at all   Social Connections   In a typical week, how many times do you talk on the phone with family, friends, or neighbors? More than 3   How often do you get together with friends or relatives? More than 3   How often do you attend Yazidism or Orthodoxy services? More than 4   Do you belong to any clubs or organizations such as Yazidism groups, unions, fraternal or athletic groups, or school groups? Yes   How often do you attend meetings of the clubs or organizations you belong to? More than 4   Are you , , , , never , or living with a partner?     Intimate Partner Violence   Within the last year, have you been afraid of your partner or ex-partner? No   Within the last year, have you been humiliated or emotionally abused in other ways by your partner or ex-partner? No   Within the last year, have you been kicked, hit, slapped, or otherwise physically hurt by your partner or ex-partner? No   Within the last year, have you been raped or forced to have any kind of sexual activity by your partner or ex-partner? No   Alcohol Use   Q1: How often do you have a drink containing alcohol? Never   Q2: How many drinks containing alcohol do you have on a typical day when you are drinking? None   Q3: How often do you have six or more drinks on one occasion? Never   Utilities   In the past 12 months has the electric, gas, oil, or water company threatened to shut off services in your home? No   Health Literacy   How often do you need to have someone help you when you read instructions, pamphlets, or other written material from your doctor or pharmacy? Never

## 2025-03-19 NOTE — CARE PLAN
Unable to contact Legal Guardian/sister Zoila Herring--phoned Zoila and there was a rapid busy signal--phoned twice  Phoned pts sister adrienne Mooney at [phone]-not a working number  Contacted pts caregiver Tiffanie you at [phone]--per Cyril Moreno lives in a residential home with 2 other people and 24/7 care  He is non verbal and needs assistance with ADL's  Pt does not use home 02/cpap/bipap but he has a nebulizer. He does not use any assistive device for ambulation  No hx of depression or anxiety  His PCP is Dr. Amos Gonzalez and he uses [facility] for his meds, for a one time script he uses [facility] on [address] in [place]  Pt is here with a possible SBO  No anticipated discharge needs at this time    DISCHARGE PLAN: AT THIS TIME THE PLAN IS HOME WITH 24/7 CARE

## 2025-03-19 NOTE — PROGRESS NOTES
Spiritual Care Visit  Spiritual Care Request    Reason for Visit:  Routine Visit: Follow-up     Request Received From:       Focus of Care:  Visited With: Patient and family together         Refer to :          Spiritual Care Assessment    Spiritual Assessment:                      Care Provided:       Sense of Community and or Anabaptist Affiliation:  Yazidi   Values/Beliefs  Spiritual Requests During Hospitalization: I gave Elliott bergering outside the xray department today with his sister.     Addressed Needs/Concerns and/or [place] Through:          Outcome:        Advance Directives:         Spiritual Care Annotation    Annotation:  I gave Elliott a blessing outside the xray department with his siter.  Pleeese be careful that his Koehler Shelby doll is near by him. It's his favorite toy  Gregory Aggarwal

## 2025-03-19 NOTE — PROGRESS NOTES
03/19/25 0830   [facility] Disability Status   Are you deaf or do you have serious difficulty hearing? N   Are you blind or do you have serious difficulty seeing, even when wearing glasses? N   Because of a physical, mental, or emotional condition, do you have serious difficulty concentrating, remembering, or making decisions? (5 years old or older) N   Do you have serious difficulty walking or climbing stairs? N   Do you have serious difficulty dressing or bathing? N   Because of a physical, mental, or emotional condition, do you have serious difficulty doing errands alone such as visiting the doctor? N

## 2025-03-20 ENCOUNTER — APPOINTMENT (OUTPATIENT)
Dept: CARDIOLOGY | Facility: HOSPITAL | Age: 60
DRG: 392 | End: 2025-03-20
Payer: MEDICARE

## 2025-03-20 LAB
ANION GAP SERPL CALCULATED.3IONS-SCNC: 10 MMOL/L (ref 10–20)
BUN SERPL-MCNC: 8 MG/DL (ref 6–23)
CALCIUM SERPL-MCNC: 8.4 MG/DL (ref 8.6–10.3)
CHLORIDE SERPL-SCNC: 109 MMOL/L (ref 98–107)
CO2 SERPL-SCNC: 22 MMOL/L (ref 21–32)
CREAT SERPL-MCNC: 1.3 MG/DL (ref 0.5–1.3)
EGFRCR SERPLBLD CKD-EPI 2021: 63 ML/MIN/1.73M*2
ERYTHROCYTE [DISTWIDTH] IN BLOOD BY AUTOMATED COUNT: 15.4 % (ref 11.5–14.5)
GLUCOSE SERPL-MCNC: 136 MG/DL (ref 74–99)
HCT VFR BLD AUTO: 45.7 % (ref 41–52)
HGB BLD-MCNC: 15 G/DL (ref 13.5–17.5)
MAGNESIUM SERPL-MCNC: 1.73 MG/DL (ref 1.6–2.4)
MCH RBC QN AUTO: 32.8 PG (ref 26–34)
MCHC RBC AUTO-ENTMCNC: 32.8 G/DL (ref 32–36)
MCV RBC AUTO: 100 FL (ref 80–100)
NRBC BLD-RTO: 0 /100 WBCS (ref 0–0)
PHOSPHATE SERPL-MCNC: 3 MG/DL (ref 2.5–4.9)
PLATELET # BLD AUTO: 163 X10*3/UL (ref 150–450)
POTASSIUM SERPL-SCNC: 3.3 MMOL/L (ref 3.5–5.3)
POTASSIUM SERPL-SCNC: 3.8 MMOL/L (ref 3.5–5.3)
RBC # BLD AUTO: 4.58 X10*6/UL (ref 4.5–5.9)
SODIUM SERPL-SCNC: 138 MMOL/L (ref 136–145)
WBC # BLD AUTO: 5.7 X10*3/UL (ref 4.4–11.3)

## 2025-03-20 PROCEDURE — 85027 COMPLETE CBC AUTOMATED: CPT | Performed by: PHYSICIAN ASSISTANT

## 2025-03-20 PROCEDURE — 2060000001 HC INTERMEDIATE ICU ROOM DAILY

## 2025-03-20 PROCEDURE — 80048 BASIC METABOLIC PNL TOTAL CA: CPT | Performed by: PHYSICIAN ASSISTANT

## 2025-03-20 PROCEDURE — 2500000001 HC RX 250 WO HCPCS SELF ADMINISTERED DRUGS (ALT 637 FOR MEDICARE OP): Performed by: PHYSICIAN ASSISTANT

## 2025-03-20 PROCEDURE — 36415 COLL VENOUS BLD VENIPUNCTURE: CPT | Performed by: PHYSICIAN ASSISTANT

## 2025-03-20 PROCEDURE — 97166 OT EVAL MOD COMPLEX 45 MIN: CPT | Mod: GO

## 2025-03-20 PROCEDURE — 2500000004 HC RX 250 GENERAL PHARMACY W/ HCPCS (ALT 636 FOR OP/ED)

## 2025-03-20 PROCEDURE — 84100 ASSAY OF PHOSPHORUS: CPT | Performed by: PHYSICIAN ASSISTANT

## 2025-03-20 PROCEDURE — 2500000002 HC RX 250 W HCPCS SELF ADMINISTERED DRUGS (ALT 637 FOR MEDICARE OP, ALT 636 FOR OP/ED)

## 2025-03-20 PROCEDURE — 97161 PT EVAL LOW COMPLEX 20 MIN: CPT | Mod: GP | Performed by: PHYSICAL THERAPIST

## 2025-03-20 PROCEDURE — 93005 ELECTROCARDIOGRAM TRACING: CPT

## 2025-03-20 PROCEDURE — 82533 TOTAL CORTISOL: CPT | Mod: WESLAB | Performed by: SURGERY

## 2025-03-20 PROCEDURE — 2500000001 HC RX 250 WO HCPCS SELF ADMINISTERED DRUGS (ALT 637 FOR MEDICARE OP)

## 2025-03-20 PROCEDURE — 83735 ASSAY OF MAGNESIUM: CPT | Performed by: PHYSICIAN ASSISTANT

## 2025-03-20 PROCEDURE — 2500000004 HC RX 250 GENERAL PHARMACY W/ HCPCS (ALT 636 FOR OP/ED): Performed by: SURGERY

## 2025-03-20 PROCEDURE — 84132 ASSAY OF SERUM POTASSIUM: CPT | Performed by: PHYSICIAN ASSISTANT

## 2025-03-20 RX ORDER — VANCOMYCIN 1.5 G/300ML
1500 INJECTION, SOLUTION INTRAVENOUS EVERY 24 HOURS
Status: DISCONTINUED | OUTPATIENT
Start: 2025-03-20 | End: 2025-03-20

## 2025-03-20 RX ORDER — VANCOMYCIN HYDROCHLORIDE 1 G/20ML
INJECTION, POWDER, LYOPHILIZED, FOR SOLUTION INTRAVENOUS DAILY PRN
Status: DISCONTINUED | OUTPATIENT
Start: 2025-03-20 | End: 2025-03-20

## 2025-03-20 RX ORDER — POTASSIUM CHLORIDE 1.5 G/1.58G
20 POWDER, FOR SOLUTION ORAL ONCE
Status: COMPLETED | OUTPATIENT
Start: 2025-03-20 | End: 2025-03-20

## 2025-03-20 RX ADMIN — POLYETHYLENE GLYCOL (3350) 17 G: 17 POWDER, FOR SOLUTION ORAL at 10:07

## 2025-03-20 RX ADMIN — FAMOTIDINE 20 MG: 20 TABLET, FILM COATED ORAL at 20:26

## 2025-03-20 RX ADMIN — CETIRIZINE HYDROCHLORIDE 10 MG: 10 TABLET, FILM COATED ORAL at 10:05

## 2025-03-20 RX ADMIN — ENOXAPARIN SODIUM 40 MG: 40 INJECTION SUBCUTANEOUS at 09:50

## 2025-03-20 RX ADMIN — POTASSIUM CHLORIDE 20 MEQ: 1.5 POWDER, FOR SOLUTION ORAL at 12:40

## 2025-03-20 RX ADMIN — ACETAMINOPHEN 500 MG: 500 TABLET ORAL at 20:26

## 2025-03-20 RX ADMIN — FAMOTIDINE 20 MG: 20 TABLET, FILM COATED ORAL at 10:05

## 2025-03-20 RX ADMIN — ATORVASTATIN CALCIUM 40 MG: 40 TABLET, FILM COATED ORAL at 20:26

## 2025-03-20 RX ADMIN — SODIUM CHLORIDE, SODIUM LACTATE, POTASSIUM CHLORIDE, AND CALCIUM CHLORIDE 500 ML: 600; 310; 30; 20 INJECTION, SOLUTION INTRAVENOUS at 22:21

## 2025-03-20 RX ADMIN — DOCUSATE SODIUM 200 MG: 100 CAPSULE, LIQUID FILLED ORAL at 09:50

## 2025-03-20 RX ADMIN — LEVOTHYROXINE SODIUM 112 MCG: 0.11 TABLET ORAL at 05:40

## 2025-03-20 ASSESSMENT — COGNITIVE AND FUNCTIONAL STATUS - GENERAL
DRESSING REGULAR LOWER BODY CLOTHING: TOTAL
DRESSING REGULAR UPPER BODY CLOTHING: TOTAL
DRESSING REGULAR UPPER BODY CLOTHING: TOTAL
TURNING FROM BACK TO SIDE WHILE IN FLAT BAD: TOTAL
TOILETING: TOTAL
TOILETING: TOTAL
MOBILITY SCORE: 6
TURNING FROM BACK TO SIDE WHILE IN FLAT BAD: A LOT
HELP NEEDED FOR BATHING: TOTAL
WALKING IN HOSPITAL ROOM: TOTAL
PERSONAL GROOMING: TOTAL
WALKING IN HOSPITAL ROOM: A LOT
MOVING FROM LYING ON BACK TO SITTING ON SIDE OF FLAT BED WITH BEDRAILS: TOTAL
CLIMB 3 TO 5 STEPS WITH RAILING: TOTAL
WALKING IN HOSPITAL ROOM: TOTAL
STANDING UP FROM CHAIR USING ARMS: TOTAL
STANDING UP FROM CHAIR USING ARMS: A LITTLE
MOVING TO AND FROM BED TO CHAIR: TOTAL
CLIMB 3 TO 5 STEPS WITH RAILING: TOTAL
PERSONAL GROOMING: TOTAL
DRESSING REGULAR LOWER BODY CLOTHING: TOTAL
STANDING UP FROM CHAIR USING ARMS: TOTAL
HELP NEEDED FOR BATHING: TOTAL
MOBILITY SCORE: 17
EATING MEALS: A LOT
TOILETING: TOTAL
MOVING TO AND FROM BED TO CHAIR: TOTAL
CLIMB 3 TO 5 STEPS WITH RAILING: A LITTLE
MOVING FROM LYING ON BACK TO SITTING ON SIDE OF FLAT BED WITH BEDRAILS: TOTAL
DAILY ACTIVITIY SCORE: 7
MOVING FROM LYING ON BACK TO SITTING ON SIDE OF FLAT BED WITH BEDRAILS: A LITTLE
DRESSING REGULAR UPPER BODY CLOTHING: TOTAL
MOBILITY SCORE: 6
MOVING TO AND FROM BED TO CHAIR: A LITTLE
TOILETING: TOTAL
DRESSING REGULAR LOWER BODY CLOTHING: TOTAL
MOVING FROM LYING ON BACK TO SITTING ON SIDE OF FLAT BED WITH BEDRAILS: TOTAL
DRESSING REGULAR LOWER BODY CLOTHING: TOTAL
MOVING TO AND FROM BED TO CHAIR: TOTAL
TURNING FROM BACK TO SIDE WHILE IN FLAT BAD: TOTAL
DAILY ACTIVITIY SCORE: 7
STANDING UP FROM CHAIR USING ARMS: TOTAL
WALKING IN HOSPITAL ROOM: A LITTLE
EATING MEALS: TOTAL
EATING MEALS: TOTAL
DAILY ACTIVITIY SCORE: 6
DAILY ACTIVITIY SCORE: 6
EATING MEALS: A LOT
HELP NEEDED FOR BATHING: TOTAL
PERSONAL GROOMING: TOTAL
PERSONAL GROOMING: TOTAL
HELP NEEDED FOR BATHING: TOTAL
DRESSING REGULAR UPPER BODY CLOTHING: TOTAL
TURNING FROM BACK TO SIDE WHILE IN FLAT BAD: TOTAL

## 2025-03-20 ASSESSMENT — PAIN SCALES - GENERAL
PAINLEVEL_OUTOF10: 0 - NO PAIN
PAINLEVEL_OUTOF10: 3
PAINLEVEL_OUTOF10: 0 - NO PAIN

## 2025-03-20 ASSESSMENT — PAIN SCALES - WONG BAKER
WONGBAKER_NUMERICALRESPONSE: HURTS LITTLE BIT
WONGBAKER_NUMERICALRESPONSE: NO HURT

## 2025-03-20 ASSESSMENT — ACTIVITIES OF DAILY LIVING (ADL)
BATHING_ASSISTANCE: TOTAL
ADL_ASSISTANCE: NEEDS ASSISTANCE

## 2025-03-20 ASSESSMENT — PAIN - FUNCTIONAL ASSESSMENT
PAIN_FUNCTIONAL_ASSESSMENT: FLACC (FACE, LEGS, ACTIVITY, CRY, CONSOLABILITY)

## 2025-03-20 ASSESSMENT — PAIN DESCRIPTION - LOCATION: LOCATION: ABDOMEN

## 2025-03-20 ASSESSMENT — PAIN DESCRIPTION - ORIENTATION: ORIENTATION: MID

## 2025-03-20 NOTE — CARE PLAN
Problem: Pain - Adult  Goal: Verbalizes/displays adequate comfort level or baseline comfort level  Outcome: Progressing     Problem: Safety - Adult  Goal: Free from fall injury  Outcome: Progressing     Problem: Discharge Planning  Goal: Discharge to home or other facility with appropriate resources  Outcome: Progressing     Problem: Chronic Conditions and Co-morbidities  Goal: Patient's chronic conditions and co-morbidity symptoms are monitored and maintained or improved  Outcome: Progressing     Problem: Nutrition  Goal: Nutrient intake appropriate for maintaining nutritional needs  Outcome: Progressing     Problem: Skin  Goal: Decreased wound size/increased tissue granulation at next dressing change  Outcome: Progressing  Goal: Participates in plan/prevention/treatment measures  Outcome: Progressing  Goal: Prevent/manage excess moisture  Outcome: Progressing  Goal: Prevent/minimize sheer/friction injuries  Outcome: Progressing  Goal: Promote/optimize nutrition  Outcome: Progressing  Goal: Promote skin healing  Outcome: Progressing   The patient's goals for the shift include      The clinical goals for the shift include maintain safety    Over the shift, the patient did not make progress toward the following goals. Barriers to progression include . Recommendations to address these barriers include .

## 2025-03-20 NOTE — NURSING NOTE
Continued care of patient. Resting comfortably with Villatoro Catheter in place draining to slight bloody output. Not in pain or discomfort. Will continue to monitor.

## 2025-03-20 NOTE — CARE PLAN
The patient's goals for the shift include      The clinical goals for the shift include safety    Over the shift, the patient did not make progress toward the following goals. Barriers to progression include the patient. Recommendations to address these barriers include educate patient on safety precautions .

## 2025-03-20 NOTE — PROGRESS NOTES
Spiritual Care Visit  Spiritual Care Request    Reason for Visit:  Routine Visit: Follow-up     Request Received From:       Focus of Care:  Visited With: Patient and family together         Refer to :          Spiritual Care Assessment    Spiritual Assessment:                      Care Provided:       Sense of Community and or Oriental orthodox Affiliation:  Spiritism   Values/Beliefs  Spiritual Requests During Hospitalization: I gave Elliott another blessing today!     Addressed Needs/Concerns and/or [place] Through:          Outcome:        Advance Directives:         Spiritual Care Annotation    Annotation:  I gave Elliott another blessng today with his sister present.  Gregory Aggarwal

## 2025-03-20 NOTE — PROGRESS NOTES
Vancomycin Dosing by Pharmacy- Cessation of Therapy    Consult to pharmacy for vancomycin dosing has been discontinued by the prescriber, pharmacy will sign off at this time.    Please call pharmacy if there are further questions or re-enter a consult if vancomycin is resumed.     CHANCE POWELL, PharmD

## 2025-03-20 NOTE — PROGRESS NOTES
"Cyril Hernandez \"Elliott\" is a 59 y.o. male on day 2 of admission presenting with Volvulus (Multi).    Subjective   Had multiple BMs overnight.  Villatoro catheter placement with some blood mixed with urine. Patients caregiver at bedside states it was difficult to place last night.   Objective     Physical Exam  Vitals and nursing note reviewed.   HENT:      Head: Normocephalic and atraumatic.      Nose: Nose normal.      Mouth/Throat:      Mouth: Mucous membranes are moist.   Eyes:      Extraocular Movements: Extraocular movements intact.   Cardiovascular:      Rate and Rhythm: Normal rate and regular rhythm.   Pulmonary:      Effort: Pulmonary effort is normal. No respiratory distress.   Abdominal:      General: There is distension.      Palpations: Abdomen is soft.      Tenderness: There is no abdominal tenderness.      Comments: Much decreased distention from yesterday   Musculoskeletal:         General: No swelling or tenderness.      Cervical back: Normal range of motion.   Skin:     General: Skin is warm and dry.   Neurological:      General: No focal deficit present.      Mental Status: He is alert. Mental status is at baseline.         Last Recorded Vitals  Blood pressure 89/62, pulse 60, temperature 36.4 °C (97.6 °F), temperature source Temporal, resp. rate 17, height 1.499 m (4' 11\"), weight 57.2 kg (126 lb 1.7 oz), SpO2 100%.  Intake/Output last 3 Shifts:  I/O last 3 completed shifts:  In: 2434.3 (42.6 mL/kg) [P.O.:920; I.V.:1514.3 (26.5 mL/kg)]  Out: 6400 (111.9 mL/kg) [Urine:6400 (3.1 mL/kg/hr)]  Weight: 57.2 kg     Relevant Results  Lab Results   Component Value Date    GLUCOSE 136 (H) 03/20/2025    CALCIUM 8.4 (L) 03/20/2025     03/20/2025    K 3.3 (L) 03/20/2025    CO2 22 03/20/2025     (H) 03/20/2025    BUN 8 03/20/2025    CREATININE 1.30 03/20/2025     Lab Results   Component Value Date    WBC 5.7 03/20/2025    HGB 15.0 03/20/2025    HCT 45.7 03/20/2025     03/20/2025     " 03/20/2025       Assessment/Plan   Assessment & Plan  Volvulus (Multi)      3/20: CT showed distended bladder. Patient straight catheterized yesterday with much output. Villatoro catheter placed with difficulty, likely prostate enlargement. Can possibly start flomax after BP becomes more stable. K+ replaced for value of 3.3 with half dose due to rising creatinine.  Repeat CT showed no evidence of volvulus or mesenteric swirling. Patient much improved today. Evidence of PNA on CT though likely residual from infection discovered 3/8 for which patient has already been treated. Family still questioning origin of patient passing out. Will order echocardiogram, last 4/2023. Patient seen and examined with Dr. Velasquez.     3/19: Patient completed bowel prep last night with multiple bowel movements.  Will check a repeat CT abdomen with oral contrast to further evaluate previously seen volvulus.  At this point patient does have some increased distention in his abdomen however no evidence of any tenderness or pain during assessment.  Recheck labs today.  Will follow-up on her CT results later today.  Pt seen and examined with Dr. Velasquez       I spent 20 minutes in the professional and overall care of this patient.      Eula Wells PA-C

## 2025-03-20 NOTE — PROGRESS NOTES
"Physical Therapy    Physical Therapy Evaluation    Patient Name: Cyril Hernandez \"Briana"  MRN: [id number]  Department: [address]  Room: 07/07-A  Today's Date: 3/20/2025   Time Calculation  Start Time: 1337  Stop Time: 1402  Time Calculation (min): 25 min    Assessment/Plan   PT Assessment  Assessment Comment: The pt was at or near his baseline according to his sister. There were no skilled PT needs indentified. Order completed and PT discontinued.  End of Session Patient Position: Bed, 3 rail up, Alarm off, not on at start of session (seated on top of bed; sister in room)  IP OR SWING BED PT PLAN  Inpatient or Swing Bed: Inpatient  PT Plan  PT Plan: PT Eval only  PT Eval Only Reason: At baseline function  PT Discharge Recommendations: No PT needed after discharge  PT - OK to Discharge: Yes    Subjective   General Visit Information:  General  Reason for Referral: Impaired functional mobility due to decreased muscular strength. This 65 year old was admitted for volvulus.  Past Medical History Relevant to Rehab: anxiety, CKD-3, Down syndrome, HTN, GERD, cardiac pacemaker, gout, psoriasis  Family/Caregiver Present: Yes  Caregiver Feedback: sister who attempted to facilitate increased mobility  Prior to Session Communication: Bedside nurse  General Comment: Cleared by RN for participation. Pt agreed to session and participated as able.    Home Living:  Home Living  Type of Home: Other (Comment) ([facility] home)  Lives With: Other (Comment) (2 house mates and 24/7 caregivers)  Home Adaptive Equipment: None  Home Layout: One level  Home Access: No concerns  Home Living Comments: Pt attends day care.    Prior Level of Function:  Prior Function Per Pt/Caregiver Report  Receives Help From: Other (Comment) (care staff)  ADL Assistance: Needs assistance (dependent for all BADLs, set up/supervision for feeding)  Homemaking Assistance: Needs assistance  Ambulatory Assistance: Independent (no other recent " falls)    Precautions:  Precautions  Medical Precautions: Fall precautions      Date/Time Vitals Session Patient Position Pulse Resp SpO2 BP MAP (mmHg)    03/20/25 1338 --  --  --  --  --  112/64  --         Objective   Pain:  Pain Assessment  Pain Assessment: FLACC (Face, Legs, Activity, Cry, Consolability)  0-10 (Numeric) Pain Score: 0 - No pain    Cognition:  Cognition  Overall Cognitive Status: Impaired at baseline  Orientation Level: Unable to assess (pt non-verbal)    General Assessments:  Activity Tolerance  Endurance: Endurance does not limit participation in activity    Sensation  Sensation Comment: Not tested as pt non-verbal    ROM  BLE AROM: grossly WFL    Strength  Strength Comments: BLE: grossly >/=3+/5    Postural Control  Postural Control: Within Functional Limits    Functional Assessments:  Bed Mobility  Bed Mobility: Yes  Bed Mobility 1  Bed Mobility 1: Supine to sitting  Level of Assistance 1: Moderate assistance (assist to pelvis via draw sheet and trunk. HOB elevated ~40°, toward L side)    Transfers  Transfer: No (Pt unable to follow commands to perform transfers. Pt demonstrated limited response to verbal stimuli despite multiple attempts.)  Extremity/Trunk Assessments:     Outcome Measures:  [facility] Basic Mobility  Turning from your back to your side while in a flat bed without using bedrails: A little  Moving from lying on your back to sitting on the side of a flat bed without using bedrails: A lot  Moving to and from bed to chair (including a wheelchair): A little  Standing up from a chair using your arms (e.g. wheelchair or bedside chair): A little  To walk in hospital room: A little  Climbing 3-5 steps with railing: A little  Basic Mobility - Total Score: 17    Encounter Problems       Encounter Problems (Active)       Pain - Adult              Education Documentation  No documentation found.  Education Comments  No comments found.

## 2025-03-20 NOTE — NURSING NOTE
messaged me regarding patient urine output. Gave update and ordered to do bladder scan and let her know- carried out.

## 2025-03-20 NOTE — NURSING NOTE
Patient blood pressures are in the high 80's physician at the bedside and she is aware dr sevilla ... Will do a work up.

## 2025-03-20 NOTE — CARE PLAN
Problem: Pain - Adult  Goal: Verbalizes/displays adequate comfort level or baseline comfort level  3/19/2025 2317 by Daniela Wetzel RN  Outcome: Progressing  3/19/2025 2316 by Daniela Wetzel RN  Outcome: Progressing     Problem: Safety - Adult  Goal: Free from fall injury  3/19/2025 2317 by Daniela Wetzel RN  Outcome: Progressing  3/19/2025 2316 by aDniela Wetzel RN  Outcome: Progressing     Problem: Discharge Planning  Goal: Discharge to home or other facility with appropriate resources  3/19/2025 2317 by Daniela Wetzel RN  Outcome: Progressing  3/19/2025 2316 by Daniela Wetzel RN  Outcome: Progressing     Problem: Chronic Conditions and Co-morbidities  Goal: Patient's chronic conditions and co-morbidity symptoms are monitored and maintained or improved  3/19/2025 2317 by Daniela Wetzel RN  Outcome: Progressing  3/19/2025 2316 by Daniela Wetzel RN  Outcome: Progressing     Problem: Nutrition  Goal: Nutrient intake appropriate for maintaining nutritional needs  3/19/2025 2317 by Daniela Wetzel RN  Outcome: Progressing  3/19/2025 2316 by Daniela Wetzel RN  Outcome: Progressing     Problem: Skin  Goal: Decreased wound size/increased tissue granulation at next dressing change  3/19/2025 2317 by Daniela Wetzel RN  Outcome: Progressing  Flowsheets (Taken 3/19/2025 1945)  Decreased wound size/increased tissue granulation at next dressing change: Protective dressings over bony prominences  3/19/2025 2316 by Daniela Wetzel RN  Outcome: Progressing  Flowsheets (Taken 3/19/2025 1945)  Decreased wound size/increased tissue granulation at next dressing change: Protective dressings over bony prominences  Goal: Participates in plan/prevention/treatment measures  3/19/2025 2317 by Daniela Wetzel RN  Outcome: Progressing  Flowsheets (Taken 3/19/2025 1945)  Participates in plan/prevention/treatment measures:    Discuss with provider PT/OT consult   Elevate heels  3/19/2025 2316 by Daniela Wetzel RN  Outcome: Progressing  Flowsheets (Taken 3/19/2025 1945)  Participates in plan/prevention/treatment measures:   Discuss with provider PT/OT consult   Elevate heels  Goal: Prevent/manage excess moisture  3/19/2025 2317 by Daniela Wetzel RN  Outcome: Progressing  Flowsheets (Taken 3/19/2025 1945)  Prevent/manage excess moisture:   Cleanse incontinence/protect with barrier cream   Moisturize dry skin  3/19/2025 2316 by Daniela Wetzel RN  Outcome: Progressing  Flowsheets (Taken 3/19/2025 1945)  Prevent/manage excess moisture:   Cleanse incontinence/protect with barrier cream   Moisturize dry skin  Goal: Prevent/minimize sheer/friction injuries  3/19/2025 2317 by Daniela Wetzel RN  Outcome: Progressing  Flowsheets (Taken 3/19/2025 1945)  Prevent/minimize sheer/friction injuries:   HOB 30 degrees or less   Turn/reposition every 2 hours/use positioning/transfer devices   Use pull sheet  3/19/2025 2316 by Daniela Wetzel RN  Outcome: Progressing  Flowsheets (Taken 3/19/2025 1945)  Prevent/minimize sheer/friction injuries:   HOB 30 degrees or less   Turn/reposition every 2 hours/use positioning/transfer devices   Use pull sheet  Goal: Promote/optimize nutrition  3/19/2025 2317 by Daniela Wetzel RN  Outcome: Progressing  Flowsheets (Taken 3/19/2025 1945)  Promote/optimize nutrition:   Assist with feeding   Monitor/record intake including meals  3/19/2025 2316 by Daniela Wetzel RN  Outcome: Progressing  Flowsheets (Taken 3/19/2025 1945)  Promote/optimize nutrition:   Assist with feeding   Monitor/record intake including meals  Goal: Promote skin healing  3/19/2025 2317 by Daniela Wetzel RN  Outcome: Progressing  Flowsheets (Taken 3/19/2025 1945)  Promote skin healing:   Assess skin/pad under line(s)/device(s)   Protective dressings over bony prominences    Turn/reposition every 2 hours/use positioning/transfer devices  3/19/2025 2316 by Daniela Wetzel RN  Outcome: Progressing  Flowsheets (Taken 3/19/2025 1945)  Promote skin healing:   Assess skin/pad under line(s)/device(s)   Protective dressings over bony prominences   Turn/reposition every 2 hours/use positioning/transfer devices   The patient's goals for the shift include      The clinical goals for the shift include maintain safety    Over the shift, the patient did not make progress toward the following goals. Barriers to progression include . Recommendations to address these barriers include .

## 2025-03-20 NOTE — PROGRESS NOTES
"Occupational Therapy    Evaluation    Patient Name: Cyril Hernandez \"Briana"  MRN: [id number]  Department: [facility]  Room: 07/07-A  Today's Date: 3/20/2025  Time Calculation  Start Time: 1338  Stop Time: 1402  Time Calculation (min): 24 min    Assessment  IP OT Assessment  OT Assessment: OT order received, chart reviewed, evaluation complete. Pt demonstrated inability to follow commands this date likely due to impaired cognition. Per sister report, pt is dependent for ADLs at baseline, and pt is able to perform functional mobility and transfers. Unable to observe this date as pt refused to get out of bed. Sister reports pt is at baseline function and is likely not following commands due to being fearful and unfamiliar with environement. No acute OT needs.  Prognosis: Fair  Barriers to Discharge Home: Physical needs, Cognition needs, Caregiver assistance  Caregiver Assistance: Caregiver assistance needed per identified barriers - however, level of patient's required assistance exceeds assistance available at home  Cognition Needs: 24hr supervision for safety awareness needed  Physical Needs: 24hr ADL assistance needed  Medical Staff Made Aware: Yes  End of Session Communication: Bedside nurse  End of Session Patient Position: Bed, 3 rail up, Alarm off, caregiver present (call light in reach, alarm off at start of session)  Plan:  No Skilled OT: At baseline function  OT Frequency: OT eval only  OT Discharge Recommendations: No further acute OT  OT - OK to Discharge: Yes    Subjective   Current Problem:  1. Volvulus (Multi)        2. Abdominal pain, generalized        3. Cardiac pacemaker  Cardiac Device Check - Remote    Cardiac Device Check - Remote    Transthoracic Echo (TTE) Complete      4. Complete heart block  Cardiac Device Check - Remote    Cardiac Device Check - Remote    Transthoracic Echo (TTE) Complete      5. Syncope, unspecified syncope type  Transthoracic Echo (TTE) Complete    "     General:  General  Reason for Referral: Activities of daily living  Past Medical History Relevant to Rehab: CKD, HLD, Down Syndrome, ANX, HTN  Family/Caregiver Present: Yes  Caregiver Feedback: sister  Co-Treatment: PT  Co-Treatment Reason: maximization of pt safety and functional mobility  Prior to Session Communication: Bedside nurse  Patient Position Received: Bed, 3 rail up, Alarm off, not on at start of session  General Comment: Pt is a 59 year old male admit with volvulus. Pt presented to ED following a syncopal episode at group home. Pt went up to stand and fell on his bottom. Pt is nonverbal at baseline.  Precautions:  Hearing/Visual Limitations: pt appears to have visual deficits as he demonstrated limited eye contact and inability to notice when items were placed in visual field. mild [place]  Medical Precautions: Fall precautions     Date/Time Vitals Session Patient Position Pulse Resp SpO2 BP MAP (mmHg)    03/20/25 1338 --  --  --  --  --  112/64  --                Pain:  Pain Assessment  Pain Assessment: FLACC (Face, Legs, Activity, Cry, Consolability)  0-10 (Numeric) Pain Score: 0 - No pain    Objective   Cognition:  Overall Cognitive Status: Impaired at baseline  Orientation Level: Unable to assess  Following Commands:  (pt does not follow commands)  Cognition Comments: pt is nonverbal at baseline and did not follow commands this date. pt did not appear to respond to  his name           Home Living:  Type of Home: Group home  Lives With:  (2 house mates and 24/7 care)  Home Adaptive Equipment: None  Home Layout: One level  Home Access: No concerns  Home Living Comments: pt attends day care during the day   Prior Function:  Level of [place]: Needs assistance with ADLs, Needs assistance with homemaking  Receives Help From: Other (Comment), Family (care staff and family)  ADL Assistance:  (needs assistance, per sister report, pt is dependent for all ADLs. able to feed himself wit set up assist but  requires supervision)  Homemaking Assistance:  (needs assistance)  Ambulatory Assistance: Independent  Prior Function Comments: per sister report, pt is able to perform transfers independently but needs assistance with all ADLs and IADLs     ADL:  Eating Assistance: Minimal  Eating Deficit: Supervision/safety, Setup (pt projected to require set up assist and opening of lids and containers.)  Grooming Assistance: Total  Grooming Deficit: Supervision/safety, Increased time to complete (pt unable to follow commands to  wash face after being provided with a wash cloth. Pt projected to require total assist with all grooming task)  Bathing Assistance: Total  Bathing Deficit: Supervision/safety, Increased time to complete , Chest, Right arm, Left arm, Abdomen, Buttocks, Right upper leg, Left upper leg, Right lower leg including foot, Left lower leg including foot (projected due to impaired congition)  UE Dressing Assistance: Total  UE Dressing Deficit: Verbal cueing, Supervision/safety, Increased time to complete, Thread RUE, Thread LUE, Pull over head, Pull around back, Pull down in back, Fasteners (Pt required total assist to vida gown as pt was unable to follow commands to thread UE through gown)  LE Dressing Assistance: Total  LE Dressing Deficit: Increased time to complete, Tie shoes, Don/doff R sock, Thread RLE into pants, Don/doff L sock, Thread LLE into pants, Thread RLE into underwear, Thread LLE into underwear, Pull up over hips, Fasteners, Don/doff R shoe, Don/doff L shoe (Pt was given a sock in hand and pt did appear to acknowledge that he was provided with a sock. Pt provided with total assist to vida socks and projected to require assist with all LE dressing)  Toileting Assistance with Device: Total  Toileting Deficit:  (pt incontinent of BM and provided with total assist for hygiene and clothing management)  Activity Tolerance:  Endurance: Tolerates 10 - 20 min exercise with multiple rests  Rate of Perceived  Exertion (RPE): 5/10  Bed Mobility/Transfers: Bed Mobility  Bed Mobility: Yes  Bed Mobility 1  Bed Mobility 1: Supine to sitting  Level of Assistance 1: Moderate assistance  Bed Mobility Comments 1: HOB elevate. Pt required mod assist to achieve sitting position. Pt demonstrated limited ability to follow commands and required assistance to initiate movements and scoot hips forward. Pt appeared fearful which may have resulted in being hesistant to perform movements.    Transfers  Transfer: No (Pt unable to follow commands to perform functional mobility and transfers. Pt demonstrated limited response to verbal stimuli despite multiple attempts.)    Functional Mobility:  Functional Mobility  Functional Mobility Performed: No       Sensation:  Light Touch: No apparent deficits       Coordination:  Movements are Fluid and Coordinated: No (pt demonstrated limited ability to perform UE movements due to not following commands)   Hand Function:  Hand Function  Gross Grasp: Functional  Coordination: Impaired  Extremities: RUE   RUE : Within Functional Limits and LUE   LUE: Within Functional Limits    Outcome Measures: [facility] Daily Activity  Putting on and taking off regular lower body clothing: Total  Bathing (including washing, rinsing, drying): Total  Putting on and taking off regular upper body clothing: Total  Toileting, which includes using toilet, bedpan or urinal: Total  Taking care of personal grooming such as brushing teeth: Total  Eating Meals: A lot  Daily Activity - Total Score: 7      Education Documentation  ADL Training, taught by JUAN J Reinoso at 3/20/2025  3:34 PM.  Learner: Patient  Readiness: Acceptance  Method: Explanation  Response: Verbalizes Understanding  Comment: Pt educated on call light use and plan for therapy.    Education Comments  No comments found.      Goals:

## 2025-03-20 NOTE — NURSING NOTE
Assumed care of care he asleep comfortable family at the bedside with concerns for the doctor. Pt is non verbal and on room air bed low and locked call light in reach .

## 2025-03-20 NOTE — CONSULTS
Vancomycin Dosing by Pharmacy- INITIAL    Cyril Hernandez is a 59 y.o. year old male who Pharmacy has been consulted for vancomycin dosing for pneumonia. Based on the patient's indication and renal status this patient will be dosed based on a goal AUC of 400-600.     Renal function is currently stable.    Visit Vitals  BP (!) 85/45 (BP Location: Left arm, Patient Position: Lying)   Pulse 60   Temp 36.4 °C (97.6 °F) (Temporal)   Resp 17        Lab Results   Component Value Date    CREATININE 1.17 2025    CREATININE 0.76 2025    CREATININE 1.39 (H) 2025    CREATININE 1.30 2025        Patient weight is as follows:   Vitals:    25 0349   Weight: 57.2 kg (126 lb 1.7 oz)       Cultures:  No results found for the encounter in last 14 days.        I/O last 3 completed shifts:  In: 2434.3 (42.6 mL/kg) [P.O.:920; I.V.:1514.3 (26.5 mL/kg)]  Out: 6400 (111.9 mL/kg) [Urine:6400 (3.1 mL/kg/hr)]  Weight: 57.2 kg   I/O during current shift:  No intake/output data recorded.    Temp (24hrs), Av.4 °C (97.5 °F), Min:36.1 °C (97 °F), Max:36.7 °C (98.1 °F)         Assessment/Plan     Patient will not be given a loading dose.  Will initiate vancomycin maintenance,  1500 mg every 24 hours.    This dosing regimen is predicted by [facility] to result in the following pharmacokinetic parameters:  Loading dose: N/A  Regimen: 1500 mg IV every 24 hours.  Start time: 09:53 on 2025  Exposure target: AUC24 (range)400-600 mg/L.hr   [id number]: 458 mg/L.hr  AUC24,ss: 581 mg/L.hr  Probability of AUC24 > 400: 88 %  Ctrough,ss: 16.4 mg/L  Probability of Ctrough,ss > 20: 31 %      Follow-up level will be ordered on  with am labs unless clinically indicated sooner.  Will continue to monitor renal function daily while on vancomycin and order serum creatinine at least every 48 hours if not already ordered.  Follow for continued vancomycin needs, clinical response, and signs/symptoms of toxicity.       CHANCE  EMI POWELL, PharmD

## 2025-03-21 ENCOUNTER — APPOINTMENT (OUTPATIENT)
Dept: RADIOLOGY | Facility: HOSPITAL | Age: 60
DRG: 392 | End: 2025-03-21
Payer: MEDICARE

## 2025-03-21 ENCOUNTER — APPOINTMENT (OUTPATIENT)
Dept: CARDIOLOGY | Facility: HOSPITAL | Age: 60
End: 2025-03-21
Payer: MEDICARE

## 2025-03-21 LAB
ANION GAP SERPL CALCULATED.3IONS-SCNC: 8 MMOL/L (ref 10–20)
AORTIC VALVE PEAK VELOCITY: 1.24 M/S
AV PEAK GRADIENT: 6 MMHG
AVA (PEAK VEL): 2.44 CM2
BUN SERPL-MCNC: 9 MG/DL (ref 6–23)
CALCIUM SERPL-MCNC: 8.1 MG/DL (ref 8.6–10.3)
CHLORIDE SERPL-SCNC: 110 MMOL/L (ref 98–107)
CHLORIDE UR-SCNC: 25 MMOL/L
CHLORIDE/CREATININE (MMOL/G) IN URINE: 68 MMOL/G CREAT (ref 23–275)
CO2 SERPL-SCNC: 26 MMOL/L (ref 21–32)
CORTIS AFTERNOON SERPL-MSCNC: 5.1 UG/DL (ref 2.5–10)
CREAT SERPL-MCNC: 1.43 MG/DL (ref 0.5–1.3)
CREAT UR-MCNC: 36.9 MG/DL (ref 20–370)
EGFRCR SERPLBLD CKD-EPI 2021: 56 ML/MIN/1.73M*2
EJECTION FRACTION: 63 %
ERYTHROCYTE [DISTWIDTH] IN BLOOD BY AUTOMATED COUNT: 15.2 % (ref 11.5–14.5)
GLUCOSE SERPL-MCNC: 86 MG/DL (ref 74–99)
HCT VFR BLD AUTO: 42.7 % (ref 41–52)
HGB BLD-MCNC: 14.4 G/DL (ref 13.5–17.5)
LEFT VENTRICLE INTERNAL DIMENSION DIASTOLE: 3 CM (ref 3.5–6)
LEFT VENTRICULAR OUTFLOW TRACT DIAMETER: 1.8 CM
MAGNESIUM SERPL-MCNC: 1.91 MG/DL (ref 1.6–2.4)
MCH RBC QN AUTO: 32.1 PG (ref 26–34)
MCHC RBC AUTO-ENTMCNC: 33.7 G/DL (ref 32–36)
MCV RBC AUTO: 95 FL (ref 80–100)
MITRAL VALVE E/A RATIO: 1.18
NRBC BLD-RTO: 0 /100 WBCS (ref 0–0)
PHOSPHATE SERPL-MCNC: 3.3 MG/DL (ref 2.5–4.9)
PLATELET # BLD AUTO: 191 X10*3/UL (ref 150–450)
POTASSIUM SERPL-SCNC: 3.8 MMOL/L (ref 3.5–5.3)
POTASSIUM UR-SCNC: 13 MMOL/L
POTASSIUM/CREAT UR-RTO: 35 MMOL/G CREAT
RBC # BLD AUTO: 4.48 X10*6/UL (ref 4.5–5.9)
RIGHT VENTRICLE PEAK SYSTOLIC PRESSURE: 22.2 MMHG
SODIUM SERPL-SCNC: 140 MMOL/L (ref 136–145)
SODIUM UR-SCNC: 16 MMOL/L
SODIUM/CREAT UR-RTO: 43 MMOL/G CREAT
T4 FREE SERPL-MCNC: 0.74 NG/DL (ref 0.61–1.12)
TSH SERPL-ACNC: 18.49 MIU/L (ref 0.44–3.98)
WBC # BLD AUTO: 5.1 X10*3/UL (ref 4.4–11.3)

## 2025-03-21 PROCEDURE — 99232 SBSQ HOSP IP/OBS MODERATE 35: CPT | Performed by: INTERNAL MEDICINE

## 2025-03-21 PROCEDURE — 83735 ASSAY OF MAGNESIUM: CPT | Performed by: PHYSICIAN ASSISTANT

## 2025-03-21 PROCEDURE — 84300 ASSAY OF URINE SODIUM: CPT | Performed by: SURGERY

## 2025-03-21 PROCEDURE — 2500000001 HC RX 250 WO HCPCS SELF ADMINISTERED DRUGS (ALT 637 FOR MEDICARE OP)

## 2025-03-21 PROCEDURE — 36415 COLL VENOUS BLD VENIPUNCTURE: CPT | Performed by: PHYSICIAN ASSISTANT

## 2025-03-21 PROCEDURE — 80048 BASIC METABOLIC PNL TOTAL CA: CPT | Performed by: PHYSICIAN ASSISTANT

## 2025-03-21 PROCEDURE — 2500000002 HC RX 250 W HCPCS SELF ADMINISTERED DRUGS (ALT 637 FOR MEDICARE OP, ALT 636 FOR OP/ED)

## 2025-03-21 PROCEDURE — 93306 TTE W/DOPPLER COMPLETE: CPT

## 2025-03-21 PROCEDURE — 76770 US EXAM ABDO BACK WALL COMP: CPT

## 2025-03-21 PROCEDURE — 82436 ASSAY OF URINE CHLORIDE: CPT | Performed by: SURGERY

## 2025-03-21 PROCEDURE — 99221 1ST HOSP IP/OBS SF/LOW 40: CPT | Performed by: INTERNAL MEDICINE

## 2025-03-21 PROCEDURE — 85027 COMPLETE CBC AUTOMATED: CPT | Performed by: PHYSICIAN ASSISTANT

## 2025-03-21 PROCEDURE — 84439 ASSAY OF FREE THYROXINE: CPT | Performed by: SURGERY

## 2025-03-21 PROCEDURE — 2500000004 HC RX 250 GENERAL PHARMACY W/ HCPCS (ALT 636 FOR OP/ED): Performed by: SURGERY

## 2025-03-21 PROCEDURE — 2500000004 HC RX 250 GENERAL PHARMACY W/ HCPCS (ALT 636 FOR OP/ED)

## 2025-03-21 PROCEDURE — 2060000001 HC INTERMEDIATE ICU ROOM DAILY

## 2025-03-21 PROCEDURE — 2500000004 HC RX 250 GENERAL PHARMACY W/ HCPCS (ALT 636 FOR OP/ED): Performed by: INTERNAL MEDICINE

## 2025-03-21 PROCEDURE — 84100 ASSAY OF PHOSPHORUS: CPT | Performed by: PHYSICIAN ASSISTANT

## 2025-03-21 PROCEDURE — 93306 TTE W/DOPPLER COMPLETE: CPT | Performed by: INTERNAL MEDICINE

## 2025-03-21 PROCEDURE — 76770 US EXAM ABDO BACK WALL COMP: CPT | Performed by: RADIOLOGY

## 2025-03-21 PROCEDURE — 84443 ASSAY THYROID STIM HORMONE: CPT | Performed by: SURGERY

## 2025-03-21 RX ORDER — SODIUM CHLORIDE 9 MG/ML
125 INJECTION, SOLUTION INTRAVENOUS CONTINUOUS
Status: DISCONTINUED | OUTPATIENT
Start: 2025-03-21 | End: 2025-03-23

## 2025-03-21 RX ORDER — SODIUM CHLORIDE, SODIUM LACTATE, POTASSIUM CHLORIDE, CALCIUM CHLORIDE 600; 310; 30; 20 MG/100ML; MG/100ML; MG/100ML; MG/100ML
50 INJECTION, SOLUTION INTRAVENOUS CONTINUOUS
Status: CANCELLED | OUTPATIENT
Start: 2025-03-21 | End: 2025-03-22

## 2025-03-21 RX ORDER — LEVOTHYROXINE SODIUM ANHYDROUS 200 UG/5ML
150 INJECTION, POWDER, LYOPHILIZED, FOR SOLUTION INTRAVENOUS
Status: DISCONTINUED | OUTPATIENT
Start: 2025-03-21 | End: 2025-03-21

## 2025-03-21 RX ORDER — LEVOTHYROXINE SODIUM ANHYDROUS 100 UG/5ML
75 INJECTION, POWDER, LYOPHILIZED, FOR SOLUTION INTRAVENOUS
Status: DISCONTINUED | OUTPATIENT
Start: 2025-03-21 | End: 2025-03-24

## 2025-03-21 RX ADMIN — SODIUM CHLORIDE 125 ML/HR: 900 INJECTION, SOLUTION INTRAVENOUS at 09:44

## 2025-03-21 RX ADMIN — POLYETHYLENE GLYCOL (3350) 17 G: 17 POWDER, FOR SOLUTION ORAL at 09:51

## 2025-03-21 RX ADMIN — LEVOTHYROXINE SODIUM 112 MCG: 0.11 TABLET ORAL at 05:04

## 2025-03-21 RX ADMIN — SODIUM CHLORIDE 125 ML/HR: 900 INJECTION, SOLUTION INTRAVENOUS at 17:56

## 2025-03-21 RX ADMIN — FAMOTIDINE 20 MG: 20 TABLET, FILM COATED ORAL at 09:50

## 2025-03-21 RX ADMIN — ATORVASTATIN CALCIUM 40 MG: 40 TABLET, FILM COATED ORAL at 20:45

## 2025-03-21 RX ADMIN — DOCUSATE SODIUM 200 MG: 100 CAPSULE, LIQUID FILLED ORAL at 09:50

## 2025-03-21 RX ADMIN — ACETAMINOPHEN 500 MG: 500 TABLET ORAL at 20:45

## 2025-03-21 RX ADMIN — CETIRIZINE HYDROCHLORIDE 10 MG: 10 TABLET, FILM COATED ORAL at 09:51

## 2025-03-21 RX ADMIN — ENOXAPARIN SODIUM 40 MG: 40 INJECTION SUBCUTANEOUS at 09:51

## 2025-03-21 RX ADMIN — LEVOTHYROXINE SODIUM ANHYDROUS 75 MCG: 100 INJECTION, POWDER, LYOPHILIZED, FOR SOLUTION INTRAVENOUS at 10:43

## 2025-03-21 RX ADMIN — FAMOTIDINE 20 MG: 20 TABLET, FILM COATED ORAL at 20:45

## 2025-03-21 ASSESSMENT — PAIN SCALES - GENERAL: PAINLEVEL_OUTOF10: 0 - NO PAIN

## 2025-03-21 ASSESSMENT — COGNITIVE AND FUNCTIONAL STATUS - GENERAL
STANDING UP FROM CHAIR USING ARMS: TOTAL
HELP NEEDED FOR BATHING: TOTAL
TOILETING: TOTAL
EATING MEALS: TOTAL
PERSONAL GROOMING: TOTAL
MOBILITY SCORE: 6
WALKING IN HOSPITAL ROOM: TOTAL
TURNING FROM BACK TO SIDE WHILE IN FLAT BAD: TOTAL
MOBILITY SCORE: 6
TOILETING: TOTAL
DRESSING REGULAR UPPER BODY CLOTHING: TOTAL
CLIMB 3 TO 5 STEPS WITH RAILING: TOTAL
PERSONAL GROOMING: TOTAL
HELP NEEDED FOR BATHING: TOTAL
MOVING TO AND FROM BED TO CHAIR: TOTAL
STANDING UP FROM CHAIR USING ARMS: TOTAL
DAILY ACTIVITIY SCORE: 6
DRESSING REGULAR LOWER BODY CLOTHING: TOTAL
TURNING FROM BACK TO SIDE WHILE IN FLAT BAD: TOTAL
MOVING FROM LYING ON BACK TO SITTING ON SIDE OF FLAT BED WITH BEDRAILS: TOTAL
CLIMB 3 TO 5 STEPS WITH RAILING: TOTAL
DRESSING REGULAR LOWER BODY CLOTHING: TOTAL
MOVING FROM LYING ON BACK TO SITTING ON SIDE OF FLAT BED WITH BEDRAILS: TOTAL
DAILY ACTIVITIY SCORE: 9
MOVING TO AND FROM BED TO CHAIR: TOTAL
WALKING IN HOSPITAL ROOM: TOTAL
DRESSING REGULAR UPPER BODY CLOTHING: TOTAL

## 2025-03-21 ASSESSMENT — PAIN SCALES - WONG BAKER
WONGBAKER_NUMERICALRESPONSE: HURTS LITTLE BIT
WONGBAKER_NUMERICALRESPONSE: NO HURT

## 2025-03-21 ASSESSMENT — PAIN - FUNCTIONAL ASSESSMENT: PAIN_FUNCTIONAL_ASSESSMENT: FLACC (FACE, LEGS, ACTIVITY, CRY, CONSOLABILITY)

## 2025-03-21 NOTE — PROGRESS NOTES
"Cryil Hernandez \"Briana" is a 59 y.o. male on day 3 of admission presenting with Volvulus (Multi).    Subjective   Continues to have bowel function and is tolerating a regular diet without issues.  He now has a Villatoro catheter in place.   Objective     Physical Exam  Vitals and nursing note reviewed.   HENT:      Head: Normocephalic and atraumatic.      Nose: Nose normal.      Mouth/Throat:      Mouth: Mucous membranes are moist.   Eyes:      Extraocular Movements: Extraocular movements intact.   Cardiovascular:      Rate and Rhythm: Normal rate and regular rhythm.   Pulmonary:      Effort: Pulmonary effort is normal. No respiratory distress.   Abdominal:      General: There is no distension.      Palpations: Abdomen is soft.      Tenderness: There is no abdominal tenderness.      Comments: Does not appear to be tender with palpation   Musculoskeletal:         General: No swelling or tenderness.      Cervical back: Normal range of motion.   Skin:     General: Skin is warm and dry.   Neurological:      General: No focal deficit present.      Mental Status: He is alert. Mental status is at baseline.         Last Recorded Vitals  Blood pressure 122/76, pulse 63, temperature 36.4 °C (97.5 °F), temperature source Tympanic, resp. rate 18, height 1.499 m (4' 11\"), weight 55.5 kg (122 lb 5.7 oz), SpO2 98%.  Intake/Output last 3 Shifts:  I/O last 3 completed shifts:  In: 1540 (27.7 mL/kg) [P.O.:1040; IV Piggyback:500]  Out: 3450 (62.2 mL/kg) [Urine:3450 (1.7 mL/kg/hr)]  Weight: 55.5 kg     Relevant Results  Lab Results   Component Value Date    GLUCOSE 86 03/21/2025    CALCIUM 8.1 (L) 03/21/2025     03/21/2025    K 3.8 03/21/2025    CO2 26 03/21/2025     (H) 03/21/2025    BUN 9 03/21/2025    CREATININE 1.43 (H) 03/21/2025     Lab Results   Component Value Date    WBC 5.1 03/21/2025    HGB 14.4 03/21/2025    HCT 42.7 03/21/2025    MCV 95 03/21/2025     03/21/2025       Assessment/Plan   Assessment & " Plan  Volvulus (Multi)  3/21: Internal medicine now to take over as primary as there are no surgical indications for patient at this time.  He is having good bowel function and tolerating a diet as normal.  Patient's sister is still concerned about his previous pacemaker interrogation as she has not been told about results.  I reached out to Dr. Spence's office and they will provide the results.  He does now also have cardiology following.  Internal medicine managing urinary retention.  I will follow to make sure patient's sister receives interrogation results.  Then general surgery will sign off.  Please call with any further questions or concerns.    Patient seen and discussed with Dr. Velasquez    3/20: CT showed distended bladder. Patient straight catheterized yesterday with much output. Villatoro catheter placed with difficulty, likely prostate enlargement. Can possibly start flomax after BP becomes more stable. K+ replaced for value of 3.3 with half dose due to rising creatinine.  Repeat CT showed no evidence of volvulus or mesenteric swirling. Patient much improved today. Evidence of PNA on CT though likely residual from infection discovered 3/8 for which patient has already been treated. Family still questioning origin of patient passing out. Will order echocardiogram, last 4/2023. Patient seen and examined with Dr. Velasquez.     3/19: Patient completed bowel prep last night with multiple bowel movements.  Will check a repeat CT abdomen with oral contrast to further evaluate previously seen volvulus.  At this point patient does have some increased distention in his abdomen however no evidence of any tenderness or pain during assessment.  Recheck labs today.  Will follow-up on her CT results later today.  Pt seen and examined with Dr. Velasquez       I spent 20 minutes in the professional and overall care of this patient.      Ramonita Vaz, APRN-CNP

## 2025-03-21 NOTE — PROGRESS NOTES
03/21/25 1051   Discharge Planning   Expected Discharge Disposition Home   Does the patient need discharge transport arranged? No     Patient will discharge home when medically cleared.  PT indicated no skilled needs.     Patient resides in residential group home, caregiver can be contacted Tiffanie you at [phone]-

## 2025-03-21 NOTE — CONSULTS
Inpatient consult to Cardiology  Consult performed by: Dave Chiang DO  Consult ordered by: Vishal Handley MD  Reason for consult: Syncope        History Of Present Illness:    Elliott Hernandez is a 59 y.o. male presenting with possible cecal volvulus and syncope.  This patient has Down syndrome and is unable to provide any verbal history.  Sister is at the bedside and able to provide history.  He does have a history of Down syndrome and did have a pacemaker inserted by Dr. Spence approximately 2 years ago.  She states she feels it is a Medtronic pacemaker and it has been functioning properly with routine pacemaker clinic monitoring.  The patient was taken to [facility] with a possible aspiration pneumonia first week of March.  The patient did have a syncopal episode in the emergency department.  Sister was there with him and she states he suddenly had a funny look on his face and then passed out for short period of time.  He recovered uneventfully was placed on antibiotic therapy and then discharged back to his group home.  On March 17 while at his  he had another syncopal episode.  Was taken to [facility] where scanning revealed a possible cecal volvulus.  He was thus transferred here to [facility] and is undergoing investigation.  Patient currently is awake appears to be in no distress.  Cardiac rhythm reveals a paced rhythm.     Last Recorded Vitals:  Vitals:    03/20/25 2254 03/21/25 0043 03/21/25 0436 03/21/25 0900   BP: 100/62 91/54 85/60 122/76   BP Location: Left arm Left arm Right arm Right arm   Patient Position:  Lying Lying Lying   Pulse:  71 60 63   Resp:  18 17 18   Temp:  36.8 °C (98.3 °F) 36.3 °C (97.4 °F) 36.4 °C (97.5 °F)   TempSrc:  Temporal Temporal Tympanic   SpO2:  98% 100% 98%   Weight:   55.5 kg (122 lb 5.7 oz)    Height:           Last Labs:  CBC - 3/21/2025:  5:18 AM  5.1 14.4 191    42.7      CMP - 3/21/2025:  5:18 AM  8.1 6.2 24 --- 0.7   3.3  3.1 23 51      PTT - No results in last year.  _   _ _     Troponin I, High Sensitivity   Date/Time Value Ref Range Status   03/17/2025 06:01 PM 3 0 - 20 ng/L Final   03/17/2025 02:58 PM 3 0 - 20 ng/L Final   10/20/2024 01:55 AM 5 0 - 20 ng/L Final     BNP   Date/Time Value Ref Range Status   10/20/2024 01:03 AM 73 0 - 99 pg/mL Final     Hemoglobin A1C   Date/Time Value Ref Range Status   10/01/2020 08:41 AM 5.4 4.0 - 6.0 % Final     Comment:     Hemoglobin A1C levels are related to mean blood glucose during the   preceding 2-3 months. The relationship table below may be used as a   general guide. Each 1% increase in HGB A1C is a reflection of an   increase in mean glucose of approximately 30 mg/dl.   Reference: Diabetes Care, volume 29, supplement 1 Jan. 2006                        HGB A1C ................. Approx. Mean Glucose   _______________________________________________   6%   ...............................  120 mg/dl   7%   ...............................  150 mg/dl   8%   ...............................  180 mg/dl   9%   ...............................  210 mg/dl   10%  ...............................  240 mg/dl  Performed at [address] [address]     03/16/2018 10:13 AM 5.1 4.0 - 6.0 % Final     Comment:     Hemoglobin A1C levels are related to mean blood glucose during the   preceding 2-3 months. The relationship table below may be used as a   general guide. Each 1% increase in HGB A1C is a reflection of an   increase in mean glucose of approximately 30 mg/dl.   Reference: Diabetes Care, volume 29, supplement 1 Jan. 2006                        HGB A1C ................. Approx. Mean Glucose   _______________________________________________   6%   ...............................  120 mg/dl   7%   ...............................  150 mg/dl   8%   ...............................  180 mg/dl   9%   ...............................  210 mg/dl   10%  ...............................  240  "mg/dl  Performed at [facility] [id number] [place] [address] [facility] [address]       LDL Calculated   Date/Time Value Ref Range Status   08/27/2024 08:36 AM 88 65 - 130 mg/dL Final   08/28/2023 09:27  (H) 65 - 130 MG/DL Final   10/11/2021 11:10  (H) 65 - 130 MG/DL Final   02/15/2021 11:12  65 - 130 MG/DL Final      Last I/O:  I/O last 3 completed shifts:  In: 1540 (27.7 mL/kg) [P.O.:1040; IV Piggyback:500]  Out: 3450 (62.2 mL/kg) [Urine:3450 (1.7 mL/kg/hr)]  Weight: 55.5 kg     Past Cardiology Tests (Last 3 Years):  EKG:  ECG 12 lead 03/17/2025      ECG 12 lead 03/08/2025 (Preliminary)      ECG 12 Lead 10/20/2024    Echo:  No results found for this or any previous visit from the past 1095 days.    Ejection Fractions:  No results found for: \"EF\"  Cath:  No results found for this or any previous visit from the past 1095 days.    Stress Test:  No results found for this or any previous visit from the past 1095 days.    Cardiac Imaging:  No results found for this or any previous visit from the past 1095 days.      Past Medical History:  He has a past medical history of Hypoxemia (04/16/2015).  Down syndrome  Hypothyroidism    Past Surgical History:  Medtronic pacemaker insertion      Social History:  He reports that he has never smoked. He has never used smokeless tobacco. He reports that he does not drink alcohol and does not use drugs.    Family History:  Family History   Problem Relation Name Age of Onset    No Known Problems Mother      No Known Problems Father          Allergies:  Inhaled anesthetics (halogen based), Peas, Sulfa (sulfonamide antibiotics), and Chocolate hazelnut flavor    Inpatient Medications:  Scheduled medications   Medication Dose Route Frequency    atorvastatin  40 mg oral Nightly    cetirizine  10 mg oral Daily    docusate sodium  200 mg oral Daily    enoxaparin  40 mg subcutaneous Daily    famotidine  20 mg oral BID    levothyroxine  75 mcg intravenous q24h CHACHA    [Held by provider] " levothyroxine  112 mcg oral Daily    polyethylene glycol  17 g oral Daily     PRN medications   Medication    acetaminophen    albuterol    alum-mag hydroxide-simeth    hydrOXYzine HCL     Continuous Medications   Medication Dose Last Rate    sodium chloride 0.9%  125 mL/hr 125 mL/hr (03/21/25 0944)     Outpatient Medications:  Current Outpatient Medications   Medication Instructions    acetaminophen (TYLENOL) 500 mg, oral, Every 6 hours PRN, headache, limping and fever of 100 degrees or higher    albuterol (Ventolin HFA) 90 mcg/actuation inhaler 2 puffs, inhalation, Every 4 hours PRN    albuterol 2.5 mg /3 mL (0.083 %) nebulizer solution INHALE CONTENTS OF 1 VIAL ORALLY VIA NEBULIZER THREE TIMES DAILY DX: PNEUMONIA *12/9 # 10 VIALS IN HOME. SEE CUST SERV*    alum-mag hydroxide-simeth (Mylanta) 200-200-20 mg/5 mL oral suspension Every 6 hours PRN    atorvastatin (Lipitor) 20 mg tablet GIVE 1 TABLET BY MOUTH ONCE DAILY DX: HIGH CHOLESTEROL    azithromycin (ZITHROMAX) 500 mg, oral, Daily    azithromycin (ZITHROMAX) 500 mg, oral, Daily    cefuroxime (CEFTIN) 500 mg, oral, 2 times daily    chlorhexidine (Peridex) 0.12 % solution SWAB ONTO TEETH AND GUMS TWICE DAILY FOR PREVENTION OF INFECTION *CALL PHARMACY FOR REFILLS*    dimethicone-colloidal oatmeaL (Aveeno Daily Moisturizing) 1.2 % lotion 1 Application, Topical, Daily, To hands, feet and elbows for dryness    docusate sodium 250 mg capsule GIVE 1 CAPSULE BY MOUTH ONCE DAILY DX: CONSTIPATION    ergocalciferol (Vitamin D-2) 1.25 MG (40083 UT) capsule GIVE 1 CAPSULE BY MOUTH ONCE A WEEK SUNDAY DX: LOW VITAMIN D *SEND 4 EVERY WK 4*    famotidine (PEPCID) 20 mg, oral, 2 times daily    hydrocortisone 1 % cream 2 times daily    hydrOXYzine HCL (ATARAX) 100 mg, oral, Every 6 hours PRN    levothyroxine (Synthroid, Levoxyl) 112 mcg tablet GIVE 1 TABLET BY MOUTH EVERY MORNING ON AN EMPTY STOMACH DX: HYPOTYHROIDISM    loratadine (Claritin) 10 mg tablet GIVE 1 TABLET BY MOUTH  ONCE DAILY FOR ALLERGIES/SNEEZING/RUNNY NOSE/ITCHY EYES    polyethylene glycol (GLYCOLAX, MIRALAX) 17 g, oral, Daily       Physical Exam:  General: Awake alert no acute distress  Head: Normocephalic  Eyes: No scleral icterus  Neck: No carotid bruits  Heart: Regular rate and rhythm  Lungs: Clear without wheezing or crackles  Abdomen: Soft bowel sounds positive  Extremities: No significant edema  Neuro: Down syndrome, nonverbal     Assessment/Plan     Syncope  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  The patient has undergone regular pacemaker interrogation with no significant abnormalities.  But will need to have a repeat interrogation of pacemaker today.  Will order echocardiogram to assess left ventricular size and function as well.  Also to evaluate whether the any evidence of a structural heart abnormality.  Will continue to monitor his rhythm on telemetry as well as vital signs which appear to be stable at this time.  Surgery team is evaluating the abnormality seen on the CT scan of his abdomen.  Will follow with you.    Peripheral IV 03/17/25 22 G Left Wrist (Active)   Site Assessment Clean;Dry;Intact 03/20/25 2017   Dressing Status Clean;Dry 03/20/25 2017   Number of days: 4       Peripheral IV 03/17/25 20 G Right;Ventral Forearm (Active)   Site Assessment Clean;Dry;Intact 03/20/25 2017   Dressing Status Clean;Dry 03/20/25 2017   Number of days: 4       Urethral Catheter Coude (Active)   Site Assessment Clean;Skin intact 03/21/25 0528   Number of days: 2       Code Status:  Full Code    I spent 45 minutes in the professional and overall care of this patient.        Dave Chiang DO

## 2025-03-21 NOTE — PROGRESS NOTES
"Cyril Hernandez \"Elliott\" is a 59 y.o. male on day 3 of admission presenting loss of consciousness and suspected Volvulus.    Subjective   He was admitted to the general surgery service on 3/18.  The general surgeon y has requested transfer to the medicine service.  He has a history of Down syndrome and is nonverbal at baseline.  He presented to the [facility] ED because of loss of consciousness that occurred just after he stood up.  He recovered immediately.  There was some fecal incontinence.  He has a history of sinus node dysfunction and intermittent heart block status post dual-chamber pacemaker implantation.  During the workup, a CT scan of the chest, abdomen and pelvis showed findings concerning for cecal volvulus and he was transferred to [facility] and admitted to the general surgery service.  Ultimately was determined that he was very constipated but he did not have a volvulus.  Constipation was treated with GoLytely and he had multiple bowel movements.  Repeat CT showed no volvulus, it showed a hugely distended bladder.  A straight cath was done and 2.4 L of urine was obtained.  Several hours later, it was found that he had more than 1 L of urine in the bladder and a Villatoro catheter was placed.  TSH was elevated at 15, and his levothyroxine dose was changed from 112 mcg oral to 75 mcg IV.  He has a good appetite.  He was transferred to the medicine service today.  His sister was at the bedside at time of this visit.  The patient was sleeping.  Issues of concern and syncope, acute kidney injury with rising creatinine.    Objective     Physical Exam  General: sleeping, arousable, not in acute distress  Cardiovascular: regular, normal S1 and S2  Lungs: good air entry bilaterally, clear to auscultation  Abdomen: soft, nontender, bowel sounds present, normoactive  Extremities: no peripheral cyanosis, no pedal edema  Neuro: sleeping, arousable      Last Recorded Vitals  Blood pressure 85/60, pulse 60, temperature " "36.3 °C (97.4 °F), temperature source Temporal, resp. rate 17, height 1.499 m (4' 11\"), weight 55.5 kg (122 lb 5.7 oz), SpO2 100%.  Intake/Output last 3 Shifts:  I/O last 3 completed shifts:  In: 1540 (27.7 mL/kg) [P.O.:1040; IV Piggyback:500]  Out: 3450 (62.2 mL/kg) [Urine:3450 (1.7 mL/kg/hr)]  Weight: 55.5 kg     Relevant Results  Lab Results   Component Value Date    WBC 5.1 03/21/2025    HGB 14.4 03/21/2025    HCT 42.7 03/21/2025    MCV 95 03/21/2025     03/21/2025     Lab Results   Component Value Date    GLUCOSE 86 03/21/2025    CALCIUM 8.1 (L) 03/21/2025     03/21/2025    K 3.8 03/21/2025    CO2 26 03/21/2025     (H) 03/21/2025    BUN 9 03/21/2025    CREATININE 1.43 (H) 03/21/2025     Scheduled medications  atorvastatin, 40 mg, oral, Nightly  cetirizine, 10 mg, oral, Daily  docusate sodium, 200 mg, oral, Daily  enoxaparin, 40 mg, subcutaneous, Daily  famotidine, 20 mg, oral, BID  levothyroxine, 75 mcg, intravenous, q24h CHACHA  [Held by provider] levothyroxine, 112 mcg, oral, Daily  polyethylene glycol, 17 g, oral, Daily      Continuous medications  sodium chloride 0.9%, 125 mL/hr      PRN medications  PRN medications: acetaminophen, albuterol, alum-mag hydroxide-simeth, hydrOXYzine HCL        Assessment/Plan   Assessment & Plan    Syncope  2D echo today  Cardiac monitoring  Consulted urology    Acute kidney injury  Possibly secondary to hypotension  IV fluids  Avoid nephrotoxic medication    Constipation  General surgery team concluded concluded that he did not have a cecal volvulus  Proved with laxatives    Hypotension  IV fluids    Hypothyroidism  On levothyroxine.  TSH was elevated at 15.8 on 3/18.  Levothyroxine was changed (increased) from 112 mcg p.o. to 75 mcg IV    Urinary retention  Villatoro catheter placed on 8/19    Down's syndrome  Supportive care    Presence of cardiac pacemaker  Stable    Plan  IV fluids  2D echo  Levothyroxine for hypothyroidism  Urology consult regarding urinary " retention  Renal ultrasound regarding acute kidney injury  Flomax when BP is more stable    Vishal Handley MD

## 2025-03-21 NOTE — CARE PLAN
Problem: Pain - Adult  Goal: Verbalizes/displays adequate comfort level or baseline comfort level  Outcome: Progressing     Problem: Safety - Adult  Goal: Free from fall injury  Outcome: Progressing     Problem: Discharge Planning  Goal: Discharge to home or other facility with appropriate resources  Outcome: Progressing     Problem: Chronic Conditions and Co-morbidities  Goal: Patient's chronic conditions and co-morbidity symptoms are monitored and maintained or improved  Outcome: Progressing     Problem: Nutrition  Goal: Nutrient intake appropriate for maintaining nutritional needs  Outcome: Progressing     Problem: Skin  Goal: Decreased wound size/increased tissue granulation at next dressing change  Outcome: Progressing  Goal: Participates in plan/prevention/treatment measures  Outcome: Progressing  Goal: Prevent/manage excess moisture  Outcome: Progressing  Goal: Prevent/minimize sheer/friction injuries  Outcome: Progressing  Goal: Promote/optimize nutrition  Outcome: Progressing  Goal: Promote skin healing  Outcome: Progressing   The patient's goals for the shift include      The clinical goals for the shift include safety    Over the shift, the patient did not make progress toward the following goals. Barriers to progression include . Recommendations to address these barriers include .

## 2025-03-21 NOTE — ASSESSMENT & PLAN NOTE
3/21: Internal medicine now to take over as primary as there are no surgical indications for patient at this time.  He is having good bowel function and tolerating a diet as normal.  Patient's sister is still concerned about his previous pacemaker interrogation as she has not been told about results.  I reached out to Dr. Spence's office and they will provide the results.  He does now also have cardiology following.  Internal medicine managing urinary retention.  I will follow to make sure patient's sister receives interrogation results.  Then general surgery will sign off.  Please call with any further questions or concerns.    Patient seen and discussed with Dr. Velasquez

## 2025-03-21 NOTE — CARE PLAN
Problem: Pain - Adult  Goal: Verbalizes/displays adequate comfort level or baseline comfort level  3/21/2025 0031 by Tiarra Rg RN  Outcome: Progressing  3/21/2025 0028 by Tiarra Rg RN  Outcome: Progressing     Problem: Safety - Adult  Goal: Free from fall injury  3/21/2025 0031 by Tiarra Rg RN  Outcome: Progressing  3/21/2025 0028 by Tiarra Rg RN  Outcome: Progressing     Problem: Discharge Planning  Goal: Discharge to home or other facility with appropriate resources  3/21/2025 0031 by Tiarra Rg RN  Outcome: Progressing  3/21/2025 0028 by Tiarra Rg RN  Outcome: Progressing     Problem: Chronic Conditions and Co-morbidities  Goal: Patient's chronic conditions and co-morbidity symptoms are monitored and maintained or improved  3/21/2025 0031 by Tiarra Rg RN  Outcome: Progressing  3/21/2025 0028 by Tiarra Rg RN  Outcome: Progressing     Problem: Nutrition  Goal: Nutrient intake appropriate for maintaining nutritional needs  3/21/2025 0031 by Tiarra Rg RN  Outcome: Progressing  3/21/2025 0028 by Tiarra Rg RN  Outcome: Progressing     Problem: Skin  Goal: Decreased wound size/increased tissue granulation at next dressing change  3/21/2025 0031 by Tiarra Rg RN  Outcome: Progressing  3/21/2025 0028 by Tiarra Rg RN  Outcome: Progressing  Goal: Participates in plan/prevention/treatment measures  3/21/2025 0031 by Tiarra Rg RN  Outcome: Progressing  3/21/2025 0028 by Tiarra Rg RN  Outcome: Progressing  Goal: Prevent/manage excess moisture  3/21/2025 0031 by Tiarra Rg RN  Outcome: Progressing  3/21/2025 0028 by Tiarra Rg RN  Outcome: Progressing  Goal: Prevent/minimize sheer/friction injuries  3/21/2025 0031 by Tiarra Rg RN  Outcome: Progressing  3/21/2025 0028 by Tiarra Rg RN  Outcome: Progressing  Goal: Promote/optimize  nutrition  3/21/2025 0031 by Tiarra Rg RN  Outcome: Progressing  3/21/2025 0028 by Tiarra Rg RN  Outcome: Progressing  Goal: Promote skin healing  3/21/2025 0031 by Tiarra Rg RN  Outcome: Progressing  3/21/2025 0028 by Tiarra Rg RN  Outcome: Progressing   The patient's goals for the shift include      The clinical goals for the shift include patient will remain safe, monitor VS    Over the shift, the patient did not make progress toward the following goals. Barriers to progression include . Recommendations to address these barriers include .

## 2025-03-21 NOTE — NURSING NOTE
Assumed care of patient non verbal sister at the bedside no distress noted . Patient eating breakfast, islas intact. Bed low and locked call light in reach.

## 2025-03-22 LAB
ANION GAP SERPL CALCULATED.3IONS-SCNC: 8 MMOL/L (ref 10–20)
BACTERIA BLD CULT: NORMAL
BACTERIA BLD CULT: NORMAL
BUN SERPL-MCNC: 9 MG/DL (ref 6–23)
CALCIUM SERPL-MCNC: 7.4 MG/DL (ref 8.6–10.3)
CHLORIDE SERPL-SCNC: 115 MMOL/L (ref 98–107)
CO2 SERPL-SCNC: 24 MMOL/L (ref 21–32)
CREAT SERPL-MCNC: 1.34 MG/DL (ref 0.5–1.3)
EGFRCR SERPLBLD CKD-EPI 2021: 61 ML/MIN/1.73M*2
ERYTHROCYTE [DISTWIDTH] IN BLOOD BY AUTOMATED COUNT: 15.5 % (ref 11.5–14.5)
GLUCOSE SERPL-MCNC: 88 MG/DL (ref 74–99)
HCT VFR BLD AUTO: 36.8 % (ref 41–52)
HGB BLD-MCNC: 12.3 G/DL (ref 13.5–17.5)
MAGNESIUM SERPL-MCNC: 1.66 MG/DL (ref 1.6–2.4)
MCH RBC QN AUTO: 32.7 PG (ref 26–34)
MCHC RBC AUTO-ENTMCNC: 33.4 G/DL (ref 32–36)
MCV RBC AUTO: 98 FL (ref 80–100)
NRBC BLD-RTO: 0 /100 WBCS (ref 0–0)
PHOSPHATE SERPL-MCNC: 3 MG/DL (ref 2.5–4.9)
PLATELET # BLD AUTO: 152 X10*3/UL (ref 150–450)
POTASSIUM SERPL-SCNC: 3.5 MMOL/L (ref 3.5–5.3)
RBC # BLD AUTO: 3.76 X10*6/UL (ref 4.5–5.9)
SODIUM SERPL-SCNC: 143 MMOL/L (ref 136–145)
WBC # BLD AUTO: 4.8 X10*3/UL (ref 4.4–11.3)

## 2025-03-22 PROCEDURE — 2500000001 HC RX 250 WO HCPCS SELF ADMINISTERED DRUGS (ALT 637 FOR MEDICARE OP): Performed by: INTERNAL MEDICINE

## 2025-03-22 PROCEDURE — 83735 ASSAY OF MAGNESIUM: CPT | Performed by: PHYSICIAN ASSISTANT

## 2025-03-22 PROCEDURE — 2060000001 HC INTERMEDIATE ICU ROOM DAILY

## 2025-03-22 PROCEDURE — 36415 COLL VENOUS BLD VENIPUNCTURE: CPT | Performed by: PHYSICIAN ASSISTANT

## 2025-03-22 PROCEDURE — 99223 1ST HOSP IP/OBS HIGH 75: CPT | Performed by: UROLOGY

## 2025-03-22 PROCEDURE — 84100 ASSAY OF PHOSPHORUS: CPT | Performed by: PHYSICIAN ASSISTANT

## 2025-03-22 PROCEDURE — 2500000004 HC RX 250 GENERAL PHARMACY W/ HCPCS (ALT 636 FOR OP/ED)

## 2025-03-22 PROCEDURE — 99232 SBSQ HOSP IP/OBS MODERATE 35: CPT | Performed by: INTERNAL MEDICINE

## 2025-03-22 PROCEDURE — 2500000001 HC RX 250 WO HCPCS SELF ADMINISTERED DRUGS (ALT 637 FOR MEDICARE OP)

## 2025-03-22 PROCEDURE — 2500000004 HC RX 250 GENERAL PHARMACY W/ HCPCS (ALT 636 FOR OP/ED): Performed by: SURGERY

## 2025-03-22 PROCEDURE — 2500000004 HC RX 250 GENERAL PHARMACY W/ HCPCS (ALT 636 FOR OP/ED): Performed by: INTERNAL MEDICINE

## 2025-03-22 PROCEDURE — 85027 COMPLETE CBC AUTOMATED: CPT | Performed by: PHYSICIAN ASSISTANT

## 2025-03-22 PROCEDURE — 80048 BASIC METABOLIC PNL TOTAL CA: CPT | Performed by: PHYSICIAN ASSISTANT

## 2025-03-22 RX ORDER — FLUDROCORTISONE ACETATE 0.1 MG/1
0.1 TABLET ORAL DAILY
Status: DISCONTINUED | OUTPATIENT
Start: 2025-03-22 | End: 2025-03-25 | Stop reason: HOSPADM

## 2025-03-22 RX ADMIN — CETIRIZINE HYDROCHLORIDE 10 MG: 10 TABLET, FILM COATED ORAL at 10:03

## 2025-03-22 RX ADMIN — ATORVASTATIN CALCIUM 40 MG: 40 TABLET, FILM COATED ORAL at 20:28

## 2025-03-22 RX ADMIN — LEVOTHYROXINE SODIUM ANHYDROUS 75 MCG: 100 INJECTION, POWDER, LYOPHILIZED, FOR SOLUTION INTRAVENOUS at 10:00

## 2025-03-22 RX ADMIN — POLYETHYLENE GLYCOL (3350) 17 G: 17 POWDER, FOR SOLUTION ORAL at 10:00

## 2025-03-22 RX ADMIN — FLUDROCORTISONE ACETATE 0.1 MG: 0.1 TABLET ORAL at 09:59

## 2025-03-22 RX ADMIN — SODIUM CHLORIDE 125 ML/HR: 900 INJECTION, SOLUTION INTRAVENOUS at 01:37

## 2025-03-22 RX ADMIN — FAMOTIDINE 20 MG: 20 TABLET, FILM COATED ORAL at 20:28

## 2025-03-22 RX ADMIN — ENOXAPARIN SODIUM 40 MG: 40 INJECTION SUBCUTANEOUS at 10:00

## 2025-03-22 RX ADMIN — SODIUM CHLORIDE 125 ML/HR: 900 INJECTION, SOLUTION INTRAVENOUS at 10:02

## 2025-03-22 RX ADMIN — FAMOTIDINE 20 MG: 20 TABLET, FILM COATED ORAL at 10:01

## 2025-03-22 RX ADMIN — DOCUSATE SODIUM 200 MG: 100 CAPSULE, LIQUID FILLED ORAL at 09:59

## 2025-03-22 ASSESSMENT — COGNITIVE AND FUNCTIONAL STATUS - GENERAL
MOVING FROM LYING ON BACK TO SITTING ON SIDE OF FLAT BED WITH BEDRAILS: A LITTLE
MOVING FROM LYING ON BACK TO SITTING ON SIDE OF FLAT BED WITH BEDRAILS: TOTAL
DRESSING REGULAR LOWER BODY CLOTHING: TOTAL
HELP NEEDED FOR BATHING: TOTAL
PERSONAL GROOMING: TOTAL
WALKING IN HOSPITAL ROOM: A LOT
TURNING FROM BACK TO SIDE WHILE IN FLAT BAD: TOTAL
STANDING UP FROM CHAIR USING ARMS: TOTAL
DRESSING REGULAR UPPER BODY CLOTHING: TOTAL
TOILETING: TOTAL
DAILY ACTIVITIY SCORE: 6
STANDING UP FROM CHAIR USING ARMS: A LITTLE
TOILETING: TOTAL
EATING MEALS: TOTAL
DAILY ACTIVITIY SCORE: 6
EATING MEALS: TOTAL
WALKING IN HOSPITAL ROOM: TOTAL
DRESSING REGULAR UPPER BODY CLOTHING: TOTAL
MOBILITY SCORE: 6
CLIMB 3 TO 5 STEPS WITH RAILING: A LOT
MOVING TO AND FROM BED TO CHAIR: TOTAL
DRESSING REGULAR LOWER BODY CLOTHING: TOTAL
MOVING TO AND FROM BED TO CHAIR: A LITTLE
PERSONAL GROOMING: TOTAL
MOBILITY SCORE: 17
CLIMB 3 TO 5 STEPS WITH RAILING: TOTAL
HELP NEEDED FOR BATHING: TOTAL

## 2025-03-22 ASSESSMENT — PAIN SCALES - GENERAL
PAINLEVEL_OUTOF10: 0 - NO PAIN
PAINLEVEL_OUTOF10: 0 - NO PAIN

## 2025-03-22 ASSESSMENT — PAIN - FUNCTIONAL ASSESSMENT: PAIN_FUNCTIONAL_ASSESSMENT: CPOT (CRITICAL CARE PAIN OBSERVATION TOOL)

## 2025-03-22 NOTE — PROGRESS NOTES
"Cyril Hernandez \"Elliott\" is a 59 y.o. male on day 4 of admission presenting with Volvulus (Multi).    Subjective   Patient awake and alert, nonverbal.       Objective     Physical Exam  General: Awake in no distress  Neck: Normal jugular venous pressures  Heart: Regular rate and rhythm  Lungs: Clear anteriorly  Abdomen: Soft bowel sounds positive  Extremities no significant edema  Neuro: Nonverbal    Last Recorded Vitals  Blood pressure (!) 109/92, pulse 62, temperature 37.2 °C (99 °F), temperature source Temporal, resp. rate 17, height 1.499 m (4' 11\"), weight 55.5 kg (122 lb 5.7 oz), SpO2 97%.  Intake/Output last 3 Shifts:  I/O last 3 completed shifts:  In: 2600.8 (46.9 mL/kg) [P.O.:480; I.V.:1620.8 (29.2 mL/kg); IV Piggyback:500]  Out: 3400 (61.3 mL/kg) [Urine:3400 (1.7 mL/kg/hr)]  Weight: 55.5 kg     Relevant Results                 This patient has a urinary catheter   Reason for the urinary catheter remaining today?     Results for orders placed or performed during the hospital encounter of 03/18/25 (from the past 24 hours)   Transthoracic Echo (TTE) Complete   Result Value Ref Range    AV pk grzegorz 1.24 m/s    LVOT diam 1.80 cm    MV E/A ratio 1.18     LV EF 63 %    RVSP 22.2 mmHg    LVIDd 3.00 cm    AV pk grad 6 mmHg    Aortic Valve Area by Continuity of Peak Velocity 2.44 cm2   Urine electrolytes   Result Value Ref Range    Sodium, Urine Random 16 mmol/L    Sodium/Creatinine Ratio 43 Not established. mmol/g Creat    Potassium, Urine Random 13 mmol/L    Potassium/Creatinine Ratio 35 Not established mmol/g Creat    Chloride, Urine Random 25 mmol/L    Chloride/Creatinine Ratio 68 23 - 275 mmol/g creat    Creatinine, Urine Random 36.9 20.0 - 370.0 mg/dL   CBC   Result Value Ref Range    WBC 4.8 4.4 - 11.3 x10*3/uL    nRBC 0.0 0.0 - 0.0 /100 WBCs    RBC 3.76 (L) 4.50 - 5.90 x10*6/uL    Hemoglobin 12.3 (L) 13.5 - 17.5 g/dL    Hematocrit 36.8 (L) 41.0 - 52.0 %    MCV 98 80 - 100 fL    MCH 32.7 26.0 - 34.0 pg    MCHC " 33.4 32.0 - 36.0 g/dL    RDW 15.5 (H) 11.5 - 14.5 %    Platelets 152 150 - 450 x10*3/uL   Basic metabolic panel   Result Value Ref Range    Glucose 88 74 - 99 mg/dL    Sodium 143 136 - 145 mmol/L    Potassium 3.5 3.5 - 5.3 mmol/L    Chloride 115 (H) 98 - 107 mmol/L    Bicarbonate 24 21 - 32 mmol/L    Anion Gap 8 (L) 10 - 20 mmol/L    Urea Nitrogen 9 6 - 23 mg/dL    Creatinine 1.34 (H) 0.50 - 1.30 mg/dL    eGFR 61 >60 mL/min/1.73m*2    Calcium 7.4 (L) 8.6 - 10.3 mg/dL   Magnesium   Result Value Ref Range    Magnesium 1.66 1.60 - 2.40 mg/dL   Phosphorus   Result Value Ref Range    Phosphorus 3.0 2.5 - 4.9 mg/dL               Assessment/Plan   Assessment & Plan  Volvulus (Multi)    Syncope  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  The patient has undergone regular pacemaker interrogation with no significant abnormalities.  But will need to have a repeat interrogation of pacemaker today.  Will order echocardiogram to assess left ventricular size and function as well.  Also to evaluate whether the any evidence of a structural heart abnormality.  Will continue to monitor his rhythm on telemetry as well as vital signs which appear to be stable at this time.  Surgery team is evaluating the abnormality seen on the CT scan of his abdomen.  Will follow with you.    3/22: No further syncopal episodes.  Pacemaker interrogated yesterday and no tachyarrhythmic events.  No episodes of atrial fibrillation no episodes of ventricular arrhythmias.  Pacemaker functioning normally.  Thus the syncopal episodes are likely not on arrhythmogenic.  The patient does have a low blood pressure and may have experienced reflex vasodepressor type events.  I think empirically adding a medication such as fludrocortisone to elevated blood pressure may be beneficial in preventing these events  in the future.  Surgery service note reviewed and they do not feel the patient will likely require surgical intervention.  Would thus begin discharge planning.          I spent 15 minutes in the professional and overall care of this patient.      Dave Chiang, DO

## 2025-03-22 NOTE — CONSULTS
"Reason For Consult  Urinary retention    History Of Present Illness  Elliott Hernandez is a 59 y.o. male presenting with syncope.  He is nonverbal and has Down syndrome however is seen with his sister.  He was found to have urinary retention of 2700 mL and the catheter was placed yesterday.  His sister reports that he does tend to hold his urine for extended periods of time.  He has no known urologic history.  His PSA was undetectable 6 months ago.     Past Medical History  He has a past medical history of Hypoxemia (04/16/2015).    Surgical History  He has no past surgical history on file.     Social History  He reports that he has never smoked. He has never used smokeless tobacco. He reports that he does not drink alcohol and does not use drugs.    Family History  Family History   Problem Relation Name Age of Onset    No Known Problems Mother      No Known Problems Father          Allergies  Inhaled anesthetics (halogen based), Peas, Sulfa (sulfonamide antibiotics), and Chocolate hazelnut flavor    Review of Systems  Urinary retention, syncope     Physical Exam  No distress, nonverbal  Normal respiratory effort  Abdomen soft nontender nondistended  Villatoro catheter in place draining ora urine  No penile lesions     Last Recorded Vitals  Blood pressure (!) 109/92, pulse 62, temperature 37.2 °C (99 °F), temperature source Temporal, resp. rate 17, height 1.499 m (4' 11\"), weight 55.5 kg (122 lb 5.7 oz), SpO2 97%.    Relevant Results      CT scan from 3/19/2025 was viewed and identified a significantly distended bladder and right-sided hydronephrosis.    Renal ultrasound from yesterday did not identify hydronephrosis.  Villatoro catheter was in place.    Creatinine today was 1.34, decreased from 1.43 yesterday.  He has had intermittent elevations of his creatinine over the last year.    Urinalysis 5 days ago identified 6-10 RBCs and 1-5 WBCs per high-power field           Assessment/Plan     59-year-old has severe urinary " retention with 2700 mL in his bladder upon catheter placement.    He does have some CKD and DELL which complicates the urinary retention..  His PSA is undetectable and prostate did not appear significantly enlarged on CT.  He had right-sided hydronephrosis was resolved with Villatoro catheter placement, and is likely due to his severe retention.    Options were reviewed with his sister.  She would like to avoid interventions which would impair his quality of life, including Villatoro catheter placement suprapubic tube or intermittent catheterization.  He was resistant to undergo Villatoro catheter placement this admission.    We also discussed further evaluation with cystoscopy as an option.    He will undergo a voiding trial tomorrow.  Catheter replacement is not planned if at all possible.          Castro Garcia MD

## 2025-03-22 NOTE — DOCUMENTATION CLARIFICATION NOTE
"    PATIENT:               KANG KHAN  ACCT #:                  [id number]  MRN:                       [id number]  :                       1965  ADMIT DATE:       3/18/2025 3:41 AM  DISCH DATE:  RESPONDING PROVIDER #:        [id number]          PROVIDER RESPONSE TEXT:    Pneumonia was ruled out after careful study    CDI QUERY TEXT:    Clarification        Instruction:    Based on your assessment of the patient and the clinical information, please provide the requested documentation by clicking on the appropriate radio button and enter any additional information if prompted.    Question: Please further specify the type of pneumonia being treated    When answering this query, please exercise your independent professional judgment. The fact that a question is being asked, does not imply that any particular answer is desired or expected.    The patient's clinical indicators include:  Clinical Information:  89 year old male presented with abdominal pain.    Clinical Indicators:  3/17- CXR- \"Hazy consolidation is seen at the right lung base, and may represent atelectasis and/or pneumonia\".    3/18- H/P- Down syndrome presenting with syncopal episode. CT demonstrating cecal volvulus, early SBO\".    3/19- CT Scan- \"Bibasilar infiltrates are identified along with small bilateral effusions.The infiltrates are more marked at the right base findings could be secondary to pneumonia\".    3/20- General Surgery- \" Patient much improved today. Evidence of PNA on CT though likely residual from infection discovered 3/8 for which patient has already been treated. He recovered uneventfully was placed on antibiotic therapy and then discharged back to his group [facility]\".    3/20- [facility]_ \"Pharmacy has been consulted for vancomycin dosing for pneumonia\".    3/21- Cardiology- \"The patient was taken to [facility] with a possible aspiration pneumonia first week of March      Treatment:  3/20- Zosyn 3.375 GM IV q6  3/20- " Vancomycin 1.5 GM IV q 24    Risk Factors:  Recent Pneumonia- possible aspiration  Options provided:  -- Pneumonia was ruled out after careful study  -- Aspiration PNA was Present on Admission  -- Gram Negative PNA was Present on Admission  -- Gram Positive PNA was Present on Admission  -- Other - I will add my own diagnosis  -- Refer to Clinical Documentation Reviewer    Query created by: Deborah Arellano on 3/21/2025 2:50 PM      Electronically signed by:  HERNÁN ODW MD 3/22/2025 4:23 PM

## 2025-03-22 NOTE — CARE PLAN
Problem: Pain - Adult  Goal: Verbalizes/displays adequate comfort level or baseline comfort level  Outcome: Progressing     Problem: Safety - Adult  Goal: Free from fall injury  Outcome: Progressing     Problem: Discharge Planning  Goal: Discharge to home or other facility with appropriate resources  Outcome: Progressing     Problem: Chronic Conditions and Co-morbidities  Goal: Patient's chronic conditions and co-morbidity symptoms are monitored and maintained or improved  Outcome: Progressing     Problem: Nutrition  Goal: Nutrient intake appropriate for maintaining nutritional needs  Outcome: Progressing     Problem: Skin  Goal: Decreased wound size/increased tissue granulation at next dressing change  Outcome: Progressing  Flowsheets (Taken 3/20/2025 1753 by Holley Smith RN)  Decreased wound size/increased tissue granulation at next dressing change:   Promote sleep for wound healing   Protective dressings over bony prominences   Utilize specialty bed per algorithm  Goal: Participates in plan/prevention/treatment measures  Outcome: Progressing  Flowsheets (Taken 3/20/2025 1753 by Holley Smith RN)  Participates in plan/prevention/treatment measures:   Discuss with provider PT/OT consult   Elevate heels   Increase activity/out of bed for meals  Goal: Prevent/manage excess moisture  Outcome: Progressing  Flowsheets (Taken 3/20/2025 1753 by Holley Smith, RN)  Prevent/manage excess moisture:   Cleanse incontinence/protect with barrier cream   Follow provider orders for dressing changes   Moisturize dry skin   Monitor for/manage infection if present   Use wicking fabric (obtain order)  Goal: Prevent/minimize sheer/friction injuries  Outcome: Progressing  Flowsheets (Taken 3/20/2025 1753 by Holley Smith RN)  Prevent/minimize sheer/friction injuries:   Complete micro-shifts as needed if patient unable. Adjust patient position to relieve pressure points, not a full turn   HOB 30 degrees or less    Turn/reposition every 2 hours/use positioning/transfer devices   Increase activity/out of bed for meals   Use pull sheet   Utilize specialty bed per algorithm  Goal: Promote/optimize nutrition  Outcome: Progressing  Goal: Promote skin healing  Outcome: Progressing   The patient's goals for the shift include      The clinical goals for the shift include remain safe and maintain vitals    Over the shift, the patient did not make progress toward the following goals. Barriers to progression include . Recommendations to address these barriers include .

## 2025-03-22 NOTE — CARE PLAN
The patient's goals for the shift include      The clinical goals for the shift include pt safety

## 2025-03-22 NOTE — PROGRESS NOTES
"Cyril Hernandez \"Elliott\" is a 59 y.o. male on day 4 of admission presenting loss of consciousness and suspected Volvulus.    Subjective   He was admitted to the general surgery service on 3/18.  The general surgeon y has requested transfer to the medicine service.  He has a history of Down syndrome and is nonverbal at baseline.  He presented to the [facility] ED because of loss of consciousness that occurred just after he stood up.  He recovered immediately.  There was some fecal incontinence.  He has a history of sinus node dysfunction and intermittent heart block status post dual-chamber pacemaker implantation.  During the workup, a CT scan of the chest, abdomen and pelvis showed findings concerning for cecal volvulus and he was transferred to [facility] and admitted to the general surgery service.  Ultimately was determined that he was very constipated but he did not have a volvulus.  Constipation was treated with GoLytely and he had multiple bowel movements.  Repeat CT showed no volvulus, it showed a hugely distended bladder.  A straight cath was done and 2.4 L of urine was obtained.  Several hours later, it was found that he had more than 1 L of urine in the bladder and a Villatoro catheter was placed.  TSH was elevated at 15, and his levothyroxine dose was changed from 112 mcg oral to 75 mcg IV.  He has a good appetite.  He was transferred to the medicine service on 3/21. His sister was at the bedside.     Objective     Physical Exam  General: sleeping, arousable, not in acute distress  Cardiovascular: regular, normal S1 and S2  Lungs: good air entry bilaterally, clear to auscultation  Abdomen: soft, nontender, bowel sounds present, normoactive  Extremities: no peripheral cyanosis, no pedal edema  Neuro: sleeping, arousable      Last Recorded Vitals  Blood pressure (!) 109/92, pulse 62, temperature 37.2 °C (99 °F), temperature source Temporal, resp. rate 17, height 1.499 m (4' 11\"), weight 55.5 kg (122 lb 5.7 oz), " SpO2 97%.  Intake/Output last 3 Shifts:  I/O last 3 completed shifts:  In: 2600.8 (46.9 mL/kg) [P.O.:480; I.V.:1620.8 (29.2 mL/kg); IV Piggyback:500]  Out: 3400 (61.3 mL/kg) [Urine:3400 (1.7 mL/kg/hr)]  Weight: 55.5 kg     Relevant Results  Lab Results   Component Value Date    WBC 4.8 03/22/2025    HGB 12.3 (L) 03/22/2025    HCT 36.8 (L) 03/22/2025    MCV 98 03/22/2025     03/22/2025     Lab Results   Component Value Date    GLUCOSE 88 03/22/2025    CALCIUM 7.4 (L) 03/22/2025     03/22/2025    K 3.5 03/22/2025    CO2 24 03/22/2025     (H) 03/22/2025    BUN 9 03/22/2025    CREATININE 1.34 (H) 03/22/2025     Scheduled medications  atorvastatin, 40 mg, oral, Nightly  cetirizine, 10 mg, oral, Daily  docusate sodium, 200 mg, oral, Daily  enoxaparin, 40 mg, subcutaneous, Daily  famotidine, 20 mg, oral, BID  fludrocortisone, 0.1 mg, oral, Daily  levothyroxine, 75 mcg, intravenous, q24h CHACHA  [Held by provider] levothyroxine, 112 mcg, oral, Daily  polyethylene glycol, 17 g, oral, Daily      Continuous medications  sodium chloride 0.9%, 125 mL/hr, Last Rate: 125 mL/hr (03/22/25 0137)      PRN medications  PRN medications: acetaminophen, albuterol, alum-mag hydroxide-simeth, hydrOXYzine HCL        Assessment/Plan   Assessment & Plan    Syncope  2D echo showed a normal EF  Pacemaker was interrogated and showed no tachyarrhythmic events. .Syncope possibly secondary to hypotension  Started on florinef by cardiology    Acute kidney injury  Possibly secondary to hypotension  Improving  IV fluids  Avoid nephrotoxic medication    Constipation  General surgery team concluded concluded that he did not have a cecal volvulus  Improved with laxatives    Hypotension  IV fluids    Hypothyroidism  On levothyroxine.  TSH was elevated at 15.8 on 3/18.  Levothyroxine was changed (increased) from 112 mcg p.o. to 75 mcg IV    Urinary retention  Villatoro catheter placed on 8/19    Down's syndrome  Supportive care    Presence of  cardiac pacemaker  Stable    Plan  IV fluids  Levothyroxine for hypothyroidism  Urology consult regarding urinary retention  Flomax when BP is more stable  Discharge planning    Vishal Handley MD

## 2025-03-23 ENCOUNTER — APPOINTMENT (OUTPATIENT)
Dept: RADIOLOGY | Facility: HOSPITAL | Age: 60
DRG: 392 | End: 2025-03-23
Payer: MEDICARE

## 2025-03-23 VITALS
OXYGEN SATURATION: 95 % | DIASTOLIC BLOOD PRESSURE: 70 MMHG | RESPIRATION RATE: 18 BRPM | TEMPERATURE: 98.3 F | HEART RATE: 58 BPM | SYSTOLIC BLOOD PRESSURE: 102 MMHG | BODY MASS INDEX: 25.45 KG/M2 | WEIGHT: 129.63 LBS | HEIGHT: 60 IN

## 2025-03-23 LAB
ANION GAP SERPL CALCULATED.3IONS-SCNC: 8 MMOL/L (ref 10–20)
BUN SERPL-MCNC: 13 MG/DL (ref 6–23)
CALCIUM SERPL-MCNC: 7.8 MG/DL (ref 8.6–10.3)
CHLORIDE SERPL-SCNC: 114 MMOL/L (ref 98–107)
CO2 SERPL-SCNC: 24 MMOL/L (ref 21–32)
CREAT SERPL-MCNC: 1.1 MG/DL (ref 0.5–1.3)
EGFRCR SERPLBLD CKD-EPI 2021: 77 ML/MIN/1.73M*2
ERYTHROCYTE [DISTWIDTH] IN BLOOD BY AUTOMATED COUNT: 15.3 % (ref 11.5–14.5)
GLUCOSE SERPL-MCNC: 85 MG/DL (ref 74–99)
HCT VFR BLD AUTO: 39 % (ref 41–52)
HGB BLD-MCNC: 13 G/DL (ref 13.5–17.5)
MAGNESIUM SERPL-MCNC: 1.71 MG/DL (ref 1.6–2.4)
MCH RBC QN AUTO: 32.5 PG (ref 26–34)
MCHC RBC AUTO-ENTMCNC: 33.3 G/DL (ref 32–36)
MCV RBC AUTO: 98 FL (ref 80–100)
NRBC BLD-RTO: 0 /100 WBCS (ref 0–0)
PHOSPHATE SERPL-MCNC: 3.1 MG/DL (ref 2.5–4.9)
PLATELET # BLD AUTO: 177 X10*3/UL (ref 150–450)
POTASSIUM SERPL-SCNC: 3.9 MMOL/L (ref 3.5–5.3)
RBC # BLD AUTO: 4 X10*6/UL (ref 4.5–5.9)
SODIUM SERPL-SCNC: 142 MMOL/L (ref 136–145)
WBC # BLD AUTO: 4.3 X10*3/UL (ref 4.4–11.3)

## 2025-03-23 PROCEDURE — 2500000001 HC RX 250 WO HCPCS SELF ADMINISTERED DRUGS (ALT 637 FOR MEDICARE OP)

## 2025-03-23 PROCEDURE — 2500000004 HC RX 250 GENERAL PHARMACY W/ HCPCS (ALT 636 FOR OP/ED)

## 2025-03-23 PROCEDURE — 85027 COMPLETE CBC AUTOMATED: CPT | Performed by: INTERNAL MEDICINE

## 2025-03-23 PROCEDURE — 84100 ASSAY OF PHOSPHORUS: CPT | Performed by: PHYSICIAN ASSISTANT

## 2025-03-23 PROCEDURE — 83735 ASSAY OF MAGNESIUM: CPT | Performed by: PHYSICIAN ASSISTANT

## 2025-03-23 PROCEDURE — 2500000004 HC RX 250 GENERAL PHARMACY W/ HCPCS (ALT 636 FOR OP/ED): Performed by: SURGERY

## 2025-03-23 PROCEDURE — 71045 X-RAY EXAM CHEST 1 VIEW: CPT

## 2025-03-23 PROCEDURE — 2500000004 HC RX 250 GENERAL PHARMACY W/ HCPCS (ALT 636 FOR OP/ED): Performed by: INTERNAL MEDICINE

## 2025-03-23 PROCEDURE — 99232 SBSQ HOSP IP/OBS MODERATE 35: CPT | Performed by: INTERNAL MEDICINE

## 2025-03-23 PROCEDURE — 80048 BASIC METABOLIC PNL TOTAL CA: CPT | Performed by: INTERNAL MEDICINE

## 2025-03-23 PROCEDURE — 2060000001 HC INTERMEDIATE ICU ROOM DAILY

## 2025-03-23 PROCEDURE — 71045 X-RAY EXAM CHEST 1 VIEW: CPT | Performed by: RADIOLOGY

## 2025-03-23 PROCEDURE — 36415 COLL VENOUS BLD VENIPUNCTURE: CPT | Performed by: INTERNAL MEDICINE

## 2025-03-23 PROCEDURE — 2500000001 HC RX 250 WO HCPCS SELF ADMINISTERED DRUGS (ALT 637 FOR MEDICARE OP): Performed by: INTERNAL MEDICINE

## 2025-03-23 PROCEDURE — 99232 SBSQ HOSP IP/OBS MODERATE 35: CPT | Performed by: UROLOGY

## 2025-03-23 RX ORDER — LANOLIN ALCOHOL/MO/W.PET/CERES
400 CREAM (GRAM) TOPICAL 3 TIMES DAILY
Status: COMPLETED | OUTPATIENT
Start: 2025-03-23 | End: 2025-03-23

## 2025-03-23 RX ADMIN — CETIRIZINE HYDROCHLORIDE 10 MG: 10 TABLET, FILM COATED ORAL at 11:25

## 2025-03-23 RX ADMIN — MAGNESIUM OXIDE TAB 400 MG (241.3 MG ELEMENTAL MG) 400 MG: 400 (241.3 MG) TAB at 17:34

## 2025-03-23 RX ADMIN — LEVOTHYROXINE SODIUM ANHYDROUS 75 MCG: 100 INJECTION, POWDER, LYOPHILIZED, FOR SOLUTION INTRAVENOUS at 11:27

## 2025-03-23 RX ADMIN — ENOXAPARIN SODIUM 40 MG: 40 INJECTION SUBCUTANEOUS at 11:29

## 2025-03-23 RX ADMIN — SODIUM CHLORIDE 125 ML/HR: 900 INJECTION, SOLUTION INTRAVENOUS at 02:47

## 2025-03-23 RX ADMIN — POLYETHYLENE GLYCOL (3350) 17 G: 17 POWDER, FOR SOLUTION ORAL at 11:22

## 2025-03-23 RX ADMIN — FAMOTIDINE 20 MG: 20 TABLET, FILM COATED ORAL at 11:26

## 2025-03-23 RX ADMIN — MAGNESIUM OXIDE TAB 400 MG (241.3 MG ELEMENTAL MG) 400 MG: 400 (241.3 MG) TAB at 11:26

## 2025-03-23 RX ADMIN — ATORVASTATIN CALCIUM 40 MG: 40 TABLET, FILM COATED ORAL at 20:40

## 2025-03-23 RX ADMIN — MAGNESIUM OXIDE TAB 400 MG (241.3 MG ELEMENTAL MG) 400 MG: 400 (241.3 MG) TAB at 20:40

## 2025-03-23 RX ADMIN — PIPERACILLIN SODIUM AND TAZOBACTAM SODIUM 3.38 G: 3; .375 INJECTION, SOLUTION INTRAVENOUS at 17:39

## 2025-03-23 RX ADMIN — FLUDROCORTISONE ACETATE 0.1 MG: 0.1 TABLET ORAL at 11:26

## 2025-03-23 RX ADMIN — FAMOTIDINE 20 MG: 20 TABLET, FILM COATED ORAL at 20:40

## 2025-03-23 RX ADMIN — DOCUSATE SODIUM 200 MG: 100 CAPSULE, LIQUID FILLED ORAL at 11:25

## 2025-03-23 ASSESSMENT — COGNITIVE AND FUNCTIONAL STATUS - GENERAL
STANDING UP FROM CHAIR USING ARMS: A LOT
DRESSING REGULAR UPPER BODY CLOTHING: TOTAL
DAILY ACTIVITIY SCORE: 6
WALKING IN HOSPITAL ROOM: A LOT
STANDING UP FROM CHAIR USING ARMS: A LOT
CLIMB 3 TO 5 STEPS WITH RAILING: A LOT
TURNING FROM BACK TO SIDE WHILE IN FLAT BAD: A LOT
MOVING TO AND FROM BED TO CHAIR: A LOT
EATING MEALS: TOTAL
MOVING FROM LYING ON BACK TO SITTING ON SIDE OF FLAT BED WITH BEDRAILS: A LOT
MOVING TO AND FROM BED TO CHAIR: A LOT
PERSONAL GROOMING: TOTAL
TOILETING: TOTAL
EATING MEALS: TOTAL
MOBILITY SCORE: 14
DRESSING REGULAR UPPER BODY CLOTHING: TOTAL
DAILY ACTIVITIY SCORE: 6
WALKING IN HOSPITAL ROOM: A LITTLE
TURNING FROM BACK TO SIDE WHILE IN FLAT BAD: A LOT
DRESSING REGULAR LOWER BODY CLOTHING: TOTAL
HELP NEEDED FOR BATHING: TOTAL
MOBILITY SCORE: 12
HELP NEEDED FOR BATHING: TOTAL
MOVING FROM LYING ON BACK TO SITTING ON SIDE OF FLAT BED WITH BEDRAILS: A LOT
TOILETING: TOTAL
DRESSING REGULAR LOWER BODY CLOTHING: TOTAL
PERSONAL GROOMING: TOTAL
CLIMB 3 TO 5 STEPS WITH RAILING: A LITTLE

## 2025-03-23 ASSESSMENT — PAIN SCALES - GENERAL
PAINLEVEL_OUTOF10: 0 - NO PAIN
PAINLEVEL_OUTOF10: 0 - NO PAIN

## 2025-03-23 ASSESSMENT — PAIN - FUNCTIONAL ASSESSMENT: PAIN_FUNCTIONAL_ASSESSMENT: FLACC (FACE, LEGS, ACTIVITY, CRY, CONSOLABILITY)

## 2025-03-23 NOTE — PROGRESS NOTES
"Cyril Hernandez \"Elliott\" is a 59 y.o. male on day 5 of admission presenting with Volvulus (Multi).    Subjective   Patient awake and alert no distress.  Sister at the bedside.       Objective     Physical Exam  General: Awake in no distress  Neck: Normal jugular venous pressures  Heart: Regular rate and rhythm  Lungs: Clear anteriorly  Neuro: Nonverbal    Last Recorded Vitals  Blood pressure 105/61, pulse 59, temperature 36.3 °C (97.3 °F), temperature source Temporal, resp. rate 18, height 1.499 m (4' 11\"), weight 58.8 kg (129 lb 10.1 oz), SpO2 97%.  Intake/Output last 3 Shifts:  I/O last 3 completed shifts:  In: 5740 (97.6 mL/kg) [P.O.:240; I.V.:5500 (93.5 mL/kg)]  Out: 2000 (34 mL/kg) [Urine:2000 (0.9 mL/kg/hr)]  Weight: 58.8 kg     Relevant Results                 This patient has a urinary catheter   Reason for the urinary catheter remaining today?       Results for orders placed or performed during the hospital encounter of 03/18/25 (from the past 24 hours)   Magnesium   Result Value Ref Range    Magnesium 1.71 1.60 - 2.40 mg/dL   Phosphorus   Result Value Ref Range    Phosphorus 3.1 2.5 - 4.9 mg/dL   Basic Metabolic Panel   Result Value Ref Range    Glucose 85 74 - 99 mg/dL    Sodium 142 136 - 145 mmol/L    Potassium 3.9 3.5 - 5.3 mmol/L    Chloride 114 (H) 98 - 107 mmol/L    Bicarbonate 24 21 - 32 mmol/L    Anion Gap 8 (L) 10 - 20 mmol/L    Urea Nitrogen 13 6 - 23 mg/dL    Creatinine 1.10 0.50 - 1.30 mg/dL    eGFR 77 >60 mL/min/1.73m*2    Calcium 7.8 (L) 8.6 - 10.3 mg/dL   CBC   Result Value Ref Range    WBC 4.3 (L) 4.4 - 11.3 x10*3/uL    nRBC 0.0 0.0 - 0.0 /100 WBCs    RBC 4.00 (L) 4.50 - 5.90 x10*6/uL    Hemoglobin 13.0 (L) 13.5 - 17.5 g/dL    Hematocrit 39.0 (L) 41.0 - 52.0 %    MCV 98 80 - 100 fL    MCH 32.5 26.0 - 34.0 pg    MCHC 33.3 32.0 - 36.0 g/dL    RDW 15.3 (H) 11.5 - 14.5 %    Platelets 177 150 - 450 x10*3/uL             Assessment/Plan   Assessment & Plan  Volvulus (Multi)    Syncope  Cecal " volvulus versus malrotation  Down's syndrome  Medtronic pacemaker  Hypothyroidism  Urinary retention     3/21: The patient reportedly had 2 episodes of syncope 1 witnessed by his sister who is at the bedside today.  The patient has undergone regular pacemaker interrogation with no significant abnormalities.  But will need to have a repeat interrogation of pacemaker today.  Will order echocardiogram to assess left ventricular size and function as well.  Also to evaluate whether the any evidence of a structural heart abnormality.  Will continue to monitor his rhythm on telemetry as well as vital signs which appear to be stable at this time.  Surgery team is evaluating the abnormality seen on the CT scan of his abdomen.  Will follow with you.     3/22: No further syncopal episodes.  Pacemaker interrogated yesterday and no tachyarrhythmic events.  No episodes of atrial fibrillation no episodes of ventricular arrhythmias.  Pacemaker functioning normally.  Thus the syncopal episodes are likely not on arrhythmogenic.  The patient does have a low blood pressure and may have experienced reflex vasodepressor type events.  I think empirically adding a medication such as fludrocortisone to elevated blood pressure may be beneficial in preventing these events in the future.  Surgery service note reviewed and they do not feel the patient will likely require surgical intervention.  Would thus begin discharge planning.    3/23: No further syncopal episodes.  Empirically added fludrocortisone therapy yesterday and blood pressure is elevated mildly.  As noted previously no tachycardic or bradycardic events noted on pacemaker interrogation.  Thus the vasa depressor reflex likely the cause of the syncopal episodes.  Empiric treatment with the fludrocortisone to be very reasonable.  Urology evaluated the patient yesterday and will be evaluating a voiding trial today.  At this point no active cardiac issues and we will sign off and follow  peripherally.  The patient will follow-up with Dr. Spence for pacemaker evaluations in the future.  Discussed this with the sister at the bedside this morning.       I spent 15 minutes in the professional and overall care of this patient.      Dave Chiang, DO

## 2025-03-23 NOTE — PROGRESS NOTES
"Cyril Hernandez \"Elliott\" is a 59 y.o. male on day 5 of admission presenting with Volvulus (Multi).    Subjective   Catheter removed this morning.  He had a bowel movement however is unclear if he voided at that point.  He has no complaints.  Sister reports he is pleased the catheters been removed.       Objective     Physical Exam  Alert, no distress  Normal respiratory effort  Abdomen soft nontender nondistended  Brief in place.    Last Recorded Vitals  Blood pressure 105/61, pulse 59, temperature 36.3 °C (97.3 °F), temperature source Temporal, resp. rate 18, height 1.499 m (4' 11\"), weight 58.8 kg (129 lb 10.1 oz), SpO2 97%.  Intake/Output last 3 Shifts:  I/O last 3 completed shifts:  In: 5740 (97.6 mL/kg) [P.O.:240; I.V.:5500 (93.5 mL/kg)]  Out: 2000 (34 mL/kg) [Urine:2000 (0.9 mL/kg/hr)]  Weight: 58.8 kg     Relevant Results                 This patient has a urinary catheter   Reason for the urinary catheter remaining today? Urine catheter unnecessary, will be removed today               Assessment/Plan   Assessment & Plan  Volvulus (Multi)    59-year-old has severe urinary retention with 2700 mL in his bladder upon catheter placement.      He is undergoing a voiding trial currently.  Will hold on tamsulosin with his low blood pressure.    He does have some CKD and DELL which complicates the urinary retention..  His PSA is undetectable and prostate did not appear significantly enlarged on CT.  He had right-sided hydronephrosis was resolved with Villatoro catheter placement, and is likely due to his severe retention.     Options have been reviewed with his sister.  She would like to avoid interventions which would impair his quality of life, including Villatoro catheter placement suprapubic tube or intermittent catheterization.  He was resistant to undergo Villatoro catheter placement this admission.     We also discussed further evaluation with cystoscopy as an option.              Castro Garcia MD      "

## 2025-03-23 NOTE — PROGRESS NOTES
"Cyril Hernandez \"Briana" is a 59 y.o. male on day 5 of admission presenting loss of consciousness and suspected Volvulus.    Subjective   He was admitted to the general surgery service on 3/18.  The general surgeon y has requested transfer to the medicine service.  He has a history of Down syndrome and is nonverbal at baseline.  He presented to the [facility] ED because of loss of consciousness that occurred just after he stood up.  He recovered immediately.  There was some fecal incontinence.  He has a history of sinus node dysfunction and intermittent heart block status post dual-chamber pacemaker implantation.  During the workup, a CT scan of the chest, abdomen and pelvis showed findings concerning for cecal volvulus and he was transferred to [facility] and admitted to the general surgery service.  Ultimately was determined that he was very constipated but he did not have a volvulus.  Constipation was treated with GoLytely and he had multiple bowel movements.  Repeat CT showed no volvulus, it showed a hugely distended bladder.  A straight cath was done and 2.4 L of urine was obtained.  Several hours later, it was found that he had more than 1 L of urine in the bladder and a Islas catheter was placed.  TSH was elevated at 15, and his levothyroxine dose was changed from 112 mcg oral to 75 mcg IV.  He has a good appetite.  He was transferred to the medicine service on 3/21. His sister was at the bedside.   His sister was at the bedside. He choked on food last night. He has no respiratory distress. He was seen by urology yesterday, islas catheter was removed this morning.     Objective     Physical Exam  General: sleeping, arousable, not in acute distress  Cardiovascular: regular, normal S1 and S2  Lungs: good air entry bilaterally with minimal basilar crackles on the right side.   Abdomen: soft, nontender, bowel sounds present, normoactive  Extremities: no peripheral cyanosis, no pedal edema  Neuro: sleeping, " "arousable      Last Recorded Vitals  Blood pressure 105/61, pulse 59, temperature 36.3 °C (97.3 °F), temperature source Temporal, resp. rate 18, height 1.499 m (4' 11\"), weight 58.8 kg (129 lb 10.1 oz), SpO2 97%.  Intake/Output last 3 Shifts:  I/O last 3 completed shifts:  In: 5740 (97.6 mL/kg) [P.O.:240; I.V.:5500 (93.5 mL/kg)]  Out: 2000 (34 mL/kg) [Urine:2000 (0.9 mL/kg/hr)]  Weight: 58.8 kg     Relevant Results  Lab Results   Component Value Date    WBC 4.3 (L) 03/23/2025    HGB 13.0 (L) 03/23/2025    HCT 39.0 (L) 03/23/2025    MCV 98 03/23/2025     03/23/2025     Lab Results   Component Value Date    GLUCOSE 85 03/23/2025    CALCIUM 7.8 (L) 03/23/2025     03/23/2025    K 3.9 03/23/2025    CO2 24 03/23/2025     (H) 03/23/2025    BUN 13 03/23/2025    CREATININE 1.10 03/23/2025     Scheduled medications  atorvastatin, 40 mg, oral, Nightly  cetirizine, 10 mg, oral, Daily  docusate sodium, 200 mg, oral, Daily  enoxaparin, 40 mg, subcutaneous, Daily  famotidine, 20 mg, oral, BID  fludrocortisone, 0.1 mg, oral, Daily  levothyroxine, 75 mcg, intravenous, q24h CHACHA  [Held by provider] levothyroxine, 112 mcg, oral, Daily  polyethylene glycol, 17 g, oral, Daily      Continuous medications  sodium chloride 0.9%, 125 mL/hr, Last Rate: 125 mL/hr (03/23/25 0247)      PRN medications  PRN medications: acetaminophen, albuterol, alum-mag hydroxide-simeth, hydrOXYzine HCL        Assessment/Plan   Assessment & Plan    Syncope  2D echo showed a normal EF  Pacemaker was interrogated and showed no tachyarrhythmic events. .Syncope possibly secondary to hypotension  Started on florinef by cardiology    Acute kidney injury  Possibly secondary to hypotension  Resolved  IV fluids stopped  Avoid nephrotoxic medication    Constipation  General surgery team concluded that he did not have a cecal volvulus  Improved with laxatives    Hypotension  Improving  Florinef started by cardiology on 3/22    Hypothyroidism  On " levothyroxine.  TSH was elevated at 15.8 on 3/18.  Levothyroxine was changed (increased) from 112 mcg p.o. to 75 mcg IV    Urinary retention  Islas catheter placed on 3/19, removed 3/23    Down's syndrome  Supportive care    Presence of cardiac pacemaker  Stable    Plan  Stop IV fluids  Levothyroxine for hypothyroidism  Trial of voiding today per urology. His islas catheter was removed this morning  Chest x-ray today  Discharge planning      Vishal Handley MD

## 2025-03-23 NOTE — CARE PLAN
The patient is unable to participate in goal setting.    The clinical goals for the shift include remain safe and free from falls

## 2025-03-23 NOTE — NURSING NOTE
Notified via secure chat bladder scan 1121  per urology bladder scan tonight and tomorrow post void

## 2025-03-24 ENCOUNTER — APPOINTMENT (OUTPATIENT)
Dept: PRIMARY CARE | Facility: CLINIC | Age: 60
End: 2025-03-24
Payer: MEDICARE

## 2025-03-24 LAB
ANION GAP SERPL CALCULATED.3IONS-SCNC: 8 MMOL/L (ref 10–20)
BUN SERPL-MCNC: 12 MG/DL (ref 6–23)
CALCIUM SERPL-MCNC: 7.9 MG/DL (ref 8.6–10.3)
CHLORIDE SERPL-SCNC: 111 MMOL/L (ref 98–107)
CO2 SERPL-SCNC: 26 MMOL/L (ref 21–32)
CREAT SERPL-MCNC: 1.27 MG/DL (ref 0.5–1.3)
EGFRCR SERPLBLD CKD-EPI 2021: 65 ML/MIN/1.73M*2
ERYTHROCYTE [DISTWIDTH] IN BLOOD BY AUTOMATED COUNT: 15.2 % (ref 11.5–14.5)
GLUCOSE SERPL-MCNC: 85 MG/DL (ref 74–99)
HCT VFR BLD AUTO: 38.4 % (ref 41–52)
HGB BLD-MCNC: 12.8 G/DL (ref 13.5–17.5)
MAGNESIUM SERPL-MCNC: 1.91 MG/DL (ref 1.6–2.4)
MCH RBC QN AUTO: 32.2 PG (ref 26–34)
MCHC RBC AUTO-ENTMCNC: 33.3 G/DL (ref 32–36)
MCV RBC AUTO: 97 FL (ref 80–100)
NRBC BLD-RTO: 0 /100 WBCS (ref 0–0)
PLATELET # BLD AUTO: 198 X10*3/UL (ref 150–450)
POTASSIUM SERPL-SCNC: 3.6 MMOL/L (ref 3.5–5.3)
RBC # BLD AUTO: 3.97 X10*6/UL (ref 4.5–5.9)
SODIUM SERPL-SCNC: 141 MMOL/L (ref 136–145)
WBC # BLD AUTO: 3.8 X10*3/UL (ref 4.4–11.3)

## 2025-03-24 PROCEDURE — 2500000001 HC RX 250 WO HCPCS SELF ADMINISTERED DRUGS (ALT 637 FOR MEDICARE OP)

## 2025-03-24 PROCEDURE — 2060000001 HC INTERMEDIATE ICU ROOM DAILY

## 2025-03-24 PROCEDURE — 80048 BASIC METABOLIC PNL TOTAL CA: CPT | Performed by: INTERNAL MEDICINE

## 2025-03-24 PROCEDURE — 99232 SBSQ HOSP IP/OBS MODERATE 35: CPT | Performed by: UROLOGY

## 2025-03-24 PROCEDURE — 99232 SBSQ HOSP IP/OBS MODERATE 35: CPT | Performed by: INTERNAL MEDICINE

## 2025-03-24 PROCEDURE — 36415 COLL VENOUS BLD VENIPUNCTURE: CPT | Performed by: INTERNAL MEDICINE

## 2025-03-24 PROCEDURE — 2500000004 HC RX 250 GENERAL PHARMACY W/ HCPCS (ALT 636 FOR OP/ED)

## 2025-03-24 PROCEDURE — 2500000004 HC RX 250 GENERAL PHARMACY W/ HCPCS (ALT 636 FOR OP/ED): Performed by: INTERNAL MEDICINE

## 2025-03-24 PROCEDURE — 2500000001 HC RX 250 WO HCPCS SELF ADMINISTERED DRUGS (ALT 637 FOR MEDICARE OP): Performed by: INTERNAL MEDICINE

## 2025-03-24 PROCEDURE — 83735 ASSAY OF MAGNESIUM: CPT | Performed by: PHYSICIAN ASSISTANT

## 2025-03-24 PROCEDURE — 85027 COMPLETE CBC AUTOMATED: CPT | Performed by: INTERNAL MEDICINE

## 2025-03-24 PROCEDURE — 2500000004 HC RX 250 GENERAL PHARMACY W/ HCPCS (ALT 636 FOR OP/ED): Performed by: SURGERY

## 2025-03-24 RX ORDER — LEVOTHYROXINE SODIUM 137 UG/1
137 TABLET ORAL DAILY
Status: DISCONTINUED | OUTPATIENT
Start: 2025-03-25 | End: 2025-03-25 | Stop reason: HOSPADM

## 2025-03-24 RX ADMIN — FAMOTIDINE 20 MG: 20 TABLET, FILM COATED ORAL at 20:29

## 2025-03-24 RX ADMIN — CETIRIZINE HYDROCHLORIDE 10 MG: 10 TABLET, FILM COATED ORAL at 10:43

## 2025-03-24 RX ADMIN — SODIUM CHLORIDE 500 ML: 900 INJECTION, SOLUTION INTRAVENOUS at 16:26

## 2025-03-24 RX ADMIN — PIPERACILLIN SODIUM AND TAZOBACTAM SODIUM 3.38 G: 3; .375 INJECTION, SOLUTION INTRAVENOUS at 05:14

## 2025-03-24 RX ADMIN — HYDROXYZINE HYDROCHLORIDE 100 MG: 25 TABLET, FILM COATED ORAL at 23:14

## 2025-03-24 RX ADMIN — ENOXAPARIN SODIUM 40 MG: 40 INJECTION SUBCUTANEOUS at 08:34

## 2025-03-24 RX ADMIN — PIPERACILLIN SODIUM AND TAZOBACTAM SODIUM 3.38 G: 3; .375 INJECTION, SOLUTION INTRAVENOUS at 12:42

## 2025-03-24 RX ADMIN — PIPERACILLIN SODIUM AND TAZOBACTAM SODIUM 3.38 G: 3; .375 INJECTION, SOLUTION INTRAVENOUS at 23:20

## 2025-03-24 RX ADMIN — PIPERACILLIN SODIUM AND TAZOBACTAM SODIUM 3.38 G: 3; .375 INJECTION, SOLUTION INTRAVENOUS at 00:04

## 2025-03-24 RX ADMIN — ATORVASTATIN CALCIUM 40 MG: 40 TABLET, FILM COATED ORAL at 20:28

## 2025-03-24 RX ADMIN — FLUDROCORTISONE ACETATE 0.1 MG: 0.1 TABLET ORAL at 10:43

## 2025-03-24 RX ADMIN — FAMOTIDINE 20 MG: 20 TABLET, FILM COATED ORAL at 10:43

## 2025-03-24 RX ADMIN — PIPERACILLIN SODIUM AND TAZOBACTAM SODIUM 3.38 G: 3; .375 INJECTION, SOLUTION INTRAVENOUS at 17:12

## 2025-03-24 RX ADMIN — LEVOTHYROXINE SODIUM ANHYDROUS 75 MCG: 100 INJECTION, POWDER, LYOPHILIZED, FOR SOLUTION INTRAVENOUS at 08:34

## 2025-03-24 ASSESSMENT — COGNITIVE AND FUNCTIONAL STATUS - GENERAL
TURNING FROM BACK TO SIDE WHILE IN FLAT BAD: A LOT
TOILETING: TOTAL
DRESSING REGULAR LOWER BODY CLOTHING: TOTAL
DRESSING REGULAR UPPER BODY CLOTHING: TOTAL
EATING MEALS: TOTAL
CLIMB 3 TO 5 STEPS WITH RAILING: TOTAL
PERSONAL GROOMING: TOTAL
CLIMB 3 TO 5 STEPS WITH RAILING: TOTAL
DRESSING REGULAR UPPER BODY CLOTHING: TOTAL
MOVING FROM LYING ON BACK TO SITTING ON SIDE OF FLAT BED WITH BEDRAILS: A LOT
TOILETING: TOTAL
MOVING FROM LYING ON BACK TO SITTING ON SIDE OF FLAT BED WITH BEDRAILS: A LOT
STANDING UP FROM CHAIR USING ARMS: A LOT
MOBILITY SCORE: 12
HELP NEEDED FOR BATHING: TOTAL
MOVING TO AND FROM BED TO CHAIR: A LOT
WALKING IN HOSPITAL ROOM: A LOT
HELP NEEDED FOR BATHING: TOTAL
TURNING FROM BACK TO SIDE WHILE IN FLAT BAD: A LITTLE
MOVING TO AND FROM BED TO CHAIR: A LOT
MOBILITY SCORE: 11
PERSONAL GROOMING: TOTAL
EATING MEALS: A LOT
WALKING IN HOSPITAL ROOM: A LOT
DAILY ACTIVITIY SCORE: 6
STANDING UP FROM CHAIR USING ARMS: A LOT
DAILY ACTIVITIY SCORE: 7
DRESSING REGULAR LOWER BODY CLOTHING: TOTAL

## 2025-03-24 ASSESSMENT — PAIN - FUNCTIONAL ASSESSMENT
PAIN_FUNCTIONAL_ASSESSMENT: FLACC (FACE, LEGS, ACTIVITY, CRY, CONSOLABILITY)
PAIN_FUNCTIONAL_ASSESSMENT: FLACC (FACE, LEGS, ACTIVITY, CRY, CONSOLABILITY)

## 2025-03-24 ASSESSMENT — PAIN SCALES - WONG BAKER: WONGBAKER_NUMERICALRESPONSE: NO HURT

## 2025-03-24 ASSESSMENT — PAIN SCALES - GENERAL
PAINLEVEL_OUTOF10: 0 - NO PAIN
PAINLEVEL_OUTOF10: 0 - NO PAIN

## 2025-03-24 NOTE — PROGRESS NOTES
"Cyril Hernandez \"Briana" is a 59 y.o. male on day 6 of admission presenting loss of consciousness and suspected Volvulus.    Subjective   He was admitted to the general surgery service on 3/18.  The general surgeon y has requested transfer to the medicine service.  He has a history of Down syndrome and is nonverbal at baseline.  He presented to the [facility] ED because of loss of consciousness that occurred just after he stood up.  He recovered immediately.  There was some fecal incontinence.  He has a history of sinus node dysfunction and intermittent heart block status post dual-chamber pacemaker implantation.  During the workup, a CT scan of the chest, abdomen and pelvis showed findings concerning for cecal volvulus and he was transferred to [facility] and admitted to the general surgery service.  Ultimately was determined that he was very constipated but he did not have a volvulus.  Constipation was treated with GoLytely and he had multiple bowel movements.  Repeat CT showed no volvulus, it showed a hugely distended bladder.  A straight cath was done and 2.4 L of urine was obtained.  Several hours later, it was found that he had more than 1 L of urine in the bladder and a Islas catheter was placed.  TSH was elevated at 15, and his levothyroxine dose was changed from 112 mcg oral to 75 mcg IV.  He has a good appetite.  He was transferred to the medicine service on 3/21. His sister was at the bedside.   A chest x-ray done yesterday showed a new left basilar infiltrate and he was started on Zosyn  His islas catheter was removed yesterday morning.     Objective     Physical Exam  General: sleeping, arousable, not in acute distress  Cardiovascular: regular, normal S1 and S2  Lungs: good air entry bilaterally with minimal basilar crackles on the right side.   Abdomen: soft, nontender, bowel sounds present, normoactive  Extremities: no peripheral cyanosis, no pedal edema  Neuro: sleeping, arousable      Last Recorded " "Vitals  Blood pressure 108/53, pulse 59, temperature 36.3 °C (97.3 °F), temperature source Temporal, resp. rate 18, height 1.499 m (4' 11\"), weight 61.9 kg (136 lb 7.4 oz), SpO2 97%.  Intake/Output last 3 Shifts:  I/O last 3 completed shifts:  In: 4389.2 (70.9 mL/kg) [P.O.:360; I.V.:3879.2 (62.7 mL/kg); IV Piggyback:150]  Out: 450 (7.3 mL/kg) [Urine:450 (0.2 mL/kg/hr)]  Weight: 61.9 kg     Relevant Results  Lab Results   Component Value Date    WBC 3.8 (L) 03/24/2025    HGB 12.8 (L) 03/24/2025    HCT 38.4 (L) 03/24/2025    MCV 97 03/24/2025     03/24/2025     Lab Results   Component Value Date    GLUCOSE 85 03/24/2025    CALCIUM 7.9 (L) 03/24/2025     03/24/2025    K 3.6 03/24/2025    CO2 26 03/24/2025     (H) 03/24/2025    BUN 12 03/24/2025    CREATININE 1.27 03/24/2025     Scheduled medications  atorvastatin, 40 mg, oral, Nightly  cetirizine, 10 mg, oral, Daily  docusate sodium, 200 mg, oral, Daily  enoxaparin, 40 mg, subcutaneous, Daily  famotidine, 20 mg, oral, BID  fludrocortisone, 0.1 mg, oral, Daily  levothyroxine, 75 mcg, intravenous, q24h CHACHA  [Held by provider] levothyroxine, 112 mcg, oral, Daily  piperacillin-tazobactam, 3.375 g, intravenous, q6h  polyethylene glycol, 17 g, oral, Daily      Continuous medications       PRN medications  PRN medications: acetaminophen, albuterol, alum-mag hydroxide-simeth, hydrOXYzine HCL        Assessment/Plan   Assessment & Plan    Syncope  2D echo showed a normal EF  Pacemaker was interrogated and showed no tachyarrhythmic events. .Syncope possibly secondary to hypotension  Started on florinef by cardiology    Acute kidney injury  Possibly secondary to hypotension  Resolved  IV fluids stopped  Avoid nephrotoxic medication    Constipation  General surgery team concluded that he did not have a cecal volvulus  Improved with laxatives    Abnormal chest x-ray  New left basilar infiltrate on 3/23, treating as bacterial pneumonia. "     Hypotension  Improving  Florinef started by cardiology on 3/22    Hypothyroidism  On levothyroxine.  TSH was elevated at 15.8 on 3/18.  Levothyroxine was changed (increased) from 112 mcg p.o. to 75 mcg IV by general surgeon  Switched back to oral at a dose of 137 mcg daily    Urinary retention  Villatoro catheter placed on 3/19, removed 3/23    Down's syndrome  Supportive care    Presence of cardiac pacemaker  Stable    Plan  Levothyroxine for hypothyroidism  Villatoro catheter was removed yesterday by urology for a voiding trial  Discharge planning.    At discharge, he will return to the group home on oral antibiotic: augmentin.   Await outcome of (urinary) voiding attempts. Urology on consult. Plan to discharge after conclusion of this and any recommendation by urology.       Vishal Handley MD

## 2025-03-24 NOTE — NURSING NOTE
After lab draw I noticed a small pink skin tear on the left antecubital area. Transparent dsg applied.

## 2025-03-24 NOTE — PROGRESS NOTES
03/24/25 1336   Discharge Planning   Type of Residence Group home   Who is requesting discharge planning? Provider   Home or Post Acute Services None   Expected Discharge Disposition Home   Does the patient need discharge transport arranged? No     Patient will discharge home when medically cleared.  PT indicated no skilled needs.      Patient resides in residential group home, caregiver can be contacted Tiffanie you at [phone]-

## 2025-03-24 NOTE — CARE PLAN
Problem: Skin  Goal: Participates in plan/prevention/treatment measures  3/24/2025 0146 by Alta Garcia RN  Outcome: Not Progressing   The patient's goals for the shift include      The clinical goals for the shift include Remains free of injury, skin remains intact.    Over the shift, the patient did not make progress toward the following goals. Barriers to progression include pt cannot participate in his care. Recommendations to address these barriers include Always include his sister in his care and decisions about his care.    Problem: Pain - Adult  Goal: Verbalizes/displays adequate comfort level or baseline comfort level  3/24/2025 0146 by Alta Garcia RN  Outcome: Not Progressing  3/24/2025 0143 by Alta Garcia RN  Outcome: Progressing  Flowsheets (Taken 3/24/2025 0143)  Verbalizes/displays adequate comfort level or baseline comfort level:   Assess pain using appropriate pain scale   Implement non-pharmacological measures as appropriate and evaluate response  3/24/2025 0137 by Alta Garcia RN  Outcome: Progressing     Problem: Skin  Goal: Participates in plan/prevention/treatment measures  3/24/2025 0146 by Alta Garcia RN  Outcome: Not Progressing  3/24/2025 0143 by Alta Garcia RN  Outcome: Progressing  3/24/2025 0137 by Alta Garcia RN  Outcome: Progressing  3/24/2025 0134 by Alta Garcia RN  Outcome: Progressing

## 2025-03-24 NOTE — CARE PLAN
The patient's goals for the shift include      The clinical goals for the shift include Remains free of injury, skin remains intact.      Problem: Pain - Adult  Goal: Verbalizes/displays adequate comfort level or baseline comfort level  Outcome: Progressing     Problem: Safety - Adult  Goal: Free from fall injury  Outcome: Progressing     Problem: Discharge Planning  Goal: Discharge to home or other facility with appropriate resources  Outcome: Progressing     Problem: Chronic Conditions and Co-morbidities  Goal: Patient's chronic conditions and co-morbidity symptoms are monitored and maintained or improved  Outcome: Progressing     Problem: Nutrition  Goal: Nutrient intake appropriate for maintaining nutritional needs  Outcome: Progressing     Problem: Skin  Goal: Decreased wound size/increased tissue granulation at next dressing change  Outcome: Progressing  Flowsheets (Taken 3/24/2025 0205 by Sussy Garcia RN)  Decreased wound size/increased tissue granulation at next dressing change: Promote sleep for wound healing  Goal: Participates in plan/prevention/treatment measures  Outcome: Progressing  Flowsheets (Taken 3/24/2025 0150 by Alta Garcia RN)  Participates in plan/prevention/treatment measures:   Increase activity/out of bed for meals   Elevate heels  Goal: Prevent/manage excess moisture  Outcome: Progressing  Flowsheets (Taken 3/24/2025 0150 by Alta Garcia RN)  Prevent/manage excess moisture:   Cleanse incontinence/protect with barrier cream   Moisturize dry skin  Goal: Prevent/minimize sheer/friction injuries  Outcome: Progressing  Flowsheets (Taken 3/24/2025 0150 by Alta Garcia RN)  Prevent/minimize sheer/friction injuries:   Increase activity/out of bed for meals   Use pull sheet   HOB 30 degrees or less  Goal: Promote/optimize nutrition  Outcome: Progressing  Flowsheets (Taken 3/24/2025 0150 by Alta Garcia RN)  Promote/optimize nutrition:   Assist with feeding    Monitor/record intake including meals   Consume > 50% meals/supplements   Offer water/supplements/favorite foods  Goal: Promote skin healing  Outcome: Progressing  Flowsheets (Taken 3/24/2025 0150 by Alta Garcia RN)  Promote skin healing:   Assess skin/pad under line(s)/device(s)   Turn/reposition every 2 hours/use positioning/transfer devices   Rotate device position/do not position patient on device     Problem: Fall/Injury  Goal: Not fall by end of shift  Outcome: Progressing  Goal: Be free from injury by end of the shift  Outcome: Progressing  Goal: Pace activities to prevent fatigue by end of the shift  Outcome: Progressing

## 2025-03-24 NOTE — CARE PLAN
The patient's goals for the shift include      The clinical goals for the shift include Remains free of injury, skin remains intact.    Over the shift, the patient did not make progress toward the following goals. Barriers to progression include pt is nonverbal, cannot participate in his care. Recommendations to address these barriers include Include his sister, who is his guardian, in all parts of and decisions in his care.

## 2025-03-24 NOTE — NURSING NOTE
While assessing pt, removed the coban from his rt hand. When removed, it revealed a small wound. Pink edges with a small yellow central area.

## 2025-03-24 NOTE — CARE PLAN
The patient's goals for the shift include      The clinical goals for the shift include Remains free of injury, skin remains intact.    Over the shift, the patient did not make progress toward the following goals. Barriers to progression include Pt nonverbal, unable to participate in care. Recommendations to address these barriers include Continue to get sister's input on caring for her brother.

## 2025-03-24 NOTE — PROGRESS NOTES
Spiritual Care Visit  Spiritual Care Request    Reason for Visit:  Routine Visit: Follow-up     Request Received From:       Focus of Care:  Visited With: Patient and family together         Refer to :          Spiritual Care Assessment    Spiritual Assessment:                      Care Provided:       Sense of Community and or Faith Affiliation:  Moravian   Values/Beliefs  Spiritual Requests During Hospitalization: I gave Elliott a blessing while he was walking today.     Addressed Needs/Concerns and/or [place] Through:          Outcome:        Advance Directives:         Spiritual Care Annotation    Annotation:  I gave Elliott a blessing today while he was walking with his sister today.  Gregory Aggarwal

## 2025-03-24 NOTE — CARE PLAN
The patient is unable to participate in goal setting    The clinical goals for the shift include Remains free of injury, skin remains intact.

## 2025-03-24 NOTE — CARE PLAN
The patient's goals for the shift include      The clinical goals for the shift include Remains free of injury, skin remains intact.    Over the shift, the patient did not make progress toward the following goals. Barriers to progression include Pt unable to take part in care as he is nonverbal. Recommendations to address these barriers include include sister in all care plan activities and decisions.    Problem: Pain - Adult  Goal: Verbalizes/displays adequate comfort level or baseline comfort level  3/24/2025 0200 by Alta Garcia RN  Outcome: Progressing  3/24/2025 0156 by Alta Garcia RN  Outcome: Progressing  3/24/2025 0150 by Alta Garcia RN  Outcome: Progressing  Flowsheets (Taken 3/24/2025 0150)  Verbalizes/displays adequate comfort level or baseline comfort level:   Assess pain using appropriate pain scale   Administer analgesics based on type and severity of pain and evaluate response   Implement non-pharmacological measures as appropriate and evaluate response  3/24/2025 0146 by Alta Garcia RN  Outcome: Not Progressing  3/24/2025 0143 by Alta Garcia RN  Outcome: Progressing  Flowsheets (Taken 3/24/2025 0143)  Verbalizes/displays adequate comfort level or baseline comfort level:   Assess pain using appropriate pain scale   Implement non-pharmacological measures as appropriate and evaluate response  3/24/2025 0137 by Alta Garcia RN  Outcome: Progressing     Problem: Skin  Goal: Participates in plan/prevention/treatment measures  3/24/2025 0200 by Alta Garcia RN  Outcome: Progressing  3/24/2025 0156 by Alta Garcia RN  Outcome: Progressing  3/24/2025 0150 by Alta Garcia RN  Outcome: Progressing  Flowsheets (Taken 3/24/2025 0150)  Participates in plan/prevention/treatment measures:   Increase activity/out of bed for meals   Elevate heels  3/24/2025 0146 by Alta Garcia RN  Outcome: Not Progressing  3/24/2025 0143 by  Alta Garcia RN  Outcome: Progressing  3/24/2025 0137 by Alta Garcia RN  Outcome: Progressing  3/24/2025 0134 by Alta Garcia RN  Outcome: Progressing

## 2025-03-25 ENCOUNTER — PHARMACY VISIT (OUTPATIENT)
Dept: PHARMACY | Facility: CLINIC | Age: 60
End: 2025-03-25
Payer: COMMERCIAL

## 2025-03-25 VITALS
RESPIRATION RATE: 18 BRPM | TEMPERATURE: 98.2 F | HEIGHT: 60 IN | SYSTOLIC BLOOD PRESSURE: 82 MMHG | OXYGEN SATURATION: 96 % | DIASTOLIC BLOOD PRESSURE: 47 MMHG | BODY MASS INDEX: 26.36 KG/M2 | WEIGHT: 134.26 LBS | HEART RATE: 60 BPM

## 2025-03-25 LAB
ANION GAP SERPL CALCULATED.3IONS-SCNC: 9 MMOL/L (ref 10–20)
BUN SERPL-MCNC: 12 MG/DL (ref 6–23)
CALCIUM SERPL-MCNC: 8.1 MG/DL (ref 8.6–10.3)
CHLORIDE SERPL-SCNC: 109 MMOL/L (ref 98–107)
CO2 SERPL-SCNC: 26 MMOL/L (ref 21–32)
CREAT SERPL-MCNC: 1.28 MG/DL (ref 0.5–1.3)
EGFRCR SERPLBLD CKD-EPI 2021: 64 ML/MIN/1.73M*2
ERYTHROCYTE [DISTWIDTH] IN BLOOD BY AUTOMATED COUNT: 15 % (ref 11.5–14.5)
GLUCOSE SERPL-MCNC: 95 MG/DL (ref 74–99)
HCT VFR BLD AUTO: 38 % (ref 41–52)
HGB BLD-MCNC: 12.9 G/DL (ref 13.5–17.5)
MAGNESIUM SERPL-MCNC: 1.7 MG/DL (ref 1.6–2.4)
MCH RBC QN AUTO: 32.7 PG (ref 26–34)
MCHC RBC AUTO-ENTMCNC: 33.9 G/DL (ref 32–36)
MCV RBC AUTO: 96 FL (ref 80–100)
NRBC BLD-RTO: 0 /100 WBCS (ref 0–0)
PLATELET # BLD AUTO: 199 X10*3/UL (ref 150–450)
POTASSIUM SERPL-SCNC: 3.9 MMOL/L (ref 3.5–5.3)
RBC # BLD AUTO: 3.95 X10*6/UL (ref 4.5–5.9)
SODIUM SERPL-SCNC: 140 MMOL/L (ref 136–145)
WBC # BLD AUTO: 10.2 X10*3/UL (ref 4.4–11.3)

## 2025-03-25 PROCEDURE — 2500000001 HC RX 250 WO HCPCS SELF ADMINISTERED DRUGS (ALT 637 FOR MEDICARE OP): Performed by: INTERNAL MEDICINE

## 2025-03-25 PROCEDURE — 2500000004 HC RX 250 GENERAL PHARMACY W/ HCPCS (ALT 636 FOR OP/ED): Performed by: INTERNAL MEDICINE

## 2025-03-25 PROCEDURE — 36415 COLL VENOUS BLD VENIPUNCTURE: CPT | Performed by: INTERNAL MEDICINE

## 2025-03-25 PROCEDURE — 2500000002 HC RX 250 W HCPCS SELF ADMINISTERED DRUGS (ALT 637 FOR MEDICARE OP, ALT 636 FOR OP/ED): Performed by: INTERNAL MEDICINE

## 2025-03-25 PROCEDURE — 2500000001 HC RX 250 WO HCPCS SELF ADMINISTERED DRUGS (ALT 637 FOR MEDICARE OP)

## 2025-03-25 PROCEDURE — 83735 ASSAY OF MAGNESIUM: CPT | Performed by: PHYSICIAN ASSISTANT

## 2025-03-25 PROCEDURE — 2500000004 HC RX 250 GENERAL PHARMACY W/ HCPCS (ALT 636 FOR OP/ED)

## 2025-03-25 PROCEDURE — 80048 BASIC METABOLIC PNL TOTAL CA: CPT | Performed by: INTERNAL MEDICINE

## 2025-03-25 PROCEDURE — 85027 COMPLETE CBC AUTOMATED: CPT | Performed by: INTERNAL MEDICINE

## 2025-03-25 PROCEDURE — RXMED WILLOW AMBULATORY MEDICATION CHARGE

## 2025-03-25 PROCEDURE — 99233 SBSQ HOSP IP/OBS HIGH 50: CPT | Performed by: FAMILY MEDICINE

## 2025-03-25 RX ORDER — FLUDROCORTISONE ACETATE 0.1 MG/1
0.1 TABLET ORAL DAILY
Qty: 30 TABLET | Refills: 0 | Status: SHIPPED | OUTPATIENT
Start: 2025-03-26 | End: 2025-04-25

## 2025-03-25 RX ORDER — AMOXICILLIN AND CLAVULANATE POTASSIUM 875; 125 MG/1; MG/1
1 TABLET, FILM COATED ORAL 2 TIMES DAILY
Qty: 10 TABLET | Refills: 0 | Status: SHIPPED | OUTPATIENT
Start: 2025-03-25 | End: 2025-03-30

## 2025-03-25 RX ORDER — LEVOTHYROXINE SODIUM 137 UG/1
137 TABLET ORAL DAILY
Qty: 30 TABLET | Refills: 1 | Status: SHIPPED | OUTPATIENT
Start: 2025-03-26 | End: 2025-05-25

## 2025-03-25 RX ADMIN — LEVOTHYROXINE SODIUM 137 MCG: 0.14 TABLET ORAL at 05:41

## 2025-03-25 RX ADMIN — PIPERACILLIN SODIUM AND TAZOBACTAM SODIUM 3.38 G: 3; .375 INJECTION, SOLUTION INTRAVENOUS at 12:55

## 2025-03-25 RX ADMIN — CETIRIZINE HYDROCHLORIDE 10 MG: 10 TABLET, FILM COATED ORAL at 10:25

## 2025-03-25 RX ADMIN — FLUDROCORTISONE ACETATE 0.1 MG: 0.1 TABLET ORAL at 10:20

## 2025-03-25 RX ADMIN — PIPERACILLIN SODIUM AND TAZOBACTAM SODIUM 3.38 G: 3; .375 INJECTION, SOLUTION INTRAVENOUS at 05:41

## 2025-03-25 RX ADMIN — ENOXAPARIN SODIUM 40 MG: 40 INJECTION SUBCUTANEOUS at 10:20

## 2025-03-25 RX ADMIN — FAMOTIDINE 20 MG: 20 TABLET, FILM COATED ORAL at 10:20

## 2025-03-25 ASSESSMENT — COGNITIVE AND FUNCTIONAL STATUS - GENERAL
TURNING FROM BACK TO SIDE WHILE IN FLAT BAD: A LOT
DAILY ACTIVITIY SCORE: 8
STANDING UP FROM CHAIR USING ARMS: A LOT
CLIMB 3 TO 5 STEPS WITH RAILING: TOTAL
DRESSING REGULAR LOWER BODY CLOTHING: TOTAL
EATING MEALS: A LOT
PERSONAL GROOMING: TOTAL
MOBILITY SCORE: 11
HELP NEEDED FOR BATHING: TOTAL
WALKING IN HOSPITAL ROOM: A LOT
DRESSING REGULAR UPPER BODY CLOTHING: TOTAL
TOILETING: A LOT
MOVING TO AND FROM BED TO CHAIR: A LOT
MOVING FROM LYING ON BACK TO SITTING ON SIDE OF FLAT BED WITH BEDRAILS: A LOT

## 2025-03-25 ASSESSMENT — PAIN SCALES - GENERAL: PAINLEVEL_OUTOF10: 0 - NO PAIN

## 2025-03-25 ASSESSMENT — PAIN SCALES - WONG BAKER: WONGBAKER_NUMERICALRESPONSE: NO HURT

## 2025-03-25 NOTE — PROGRESS NOTES
Music Therapy Note    Therapy Session  Referral Type: New referral this admission  Visit Type: New visit  Session Start Time: 0958  Session End Time: 1015  Intervention Delivery: In-person  Conflict of Service: None  Number of family members present: 1  Family Present for Session: Sibling(s)  Family Participation: Interactive  Number of staff members present: 1 (MT)     Pre-assessment  Unable to Assess Reason: Cognitive limitation  Mood/Affect: Appropriate, Calm, Cooperative         Treatment/Interventions  Areas of Focus: Socialization, Normalization, Coping  Music Therapy Interventions: Live music listening, Active music engagement, Assessment  Co-Treatment: Music therapy  Interruption: No  Patient Fell Asleep at End of Session: No    Post-assessment  Unable to Assess Reason: Cognitive limitation  Mood/Affect: Cooperative, Appropriate, Calm, Participative  Continue Visiting: No (Discharged)  Total Session Time (min): 17 minutes    Narrative  Assessment Detail: Patient was found sitting upright in chair with family member beside him. Pt was listening to music as the MTI walked in. MTI introduced services and his family member shared that he enjoys soft rock and country.  Plan: MTI engaged pt in live music listening to address goals  Intervention: Pt participated by listening to music quietly and his sister played the maracas while the MTI and MT provided preferred music via guitar, piano, and voice.  Evaluation: Pt responded by swaying back and forth to the music. Pt displayed brightening affect during music as evidence by smiling. Pt's sister expressed gratitude for music therapy services.  Follow-up: Pt planned to discharge    Education Documentation  No documentation found.

## 2025-03-25 NOTE — PROGRESS NOTES
"Cyril Hernandez \"Briana" is a 59 y.o. male on day 7 of admission presenting loss of consciousness and suspected Volvulus.    Subjective   He was admitted to the general surgery service on 3/18.  The general surgeon y has requested transfer to the medicine service.  He has a history of Down syndrome and is nonverbal at baseline.  He presented to the [facility] ED because of loss of consciousness that occurred just after he stood up.  He recovered immediately.  There was some fecal incontinence.  He has a history of sinus node dysfunction and intermittent heart block status post dual-chamber pacemaker implantation.  During the workup, a CT scan of the chest, abdomen and pelvis showed findings concerning for cecal volvulus and he was transferred to [facility] and admitted to the general surgery service.  Ultimately was determined that he was very constipated but he did not have a volvulus.  Constipation was treated with GoLytely and he had multiple bowel movements.  Repeat CT showed no volvulus, it showed a hugely distended bladder.  A straight cath was done and 2.4 L of urine was obtained.  Several hours later, it was found that he had more than 1 L of urine in the bladder and a Islas catheter was placed.  TSH was elevated at 15, and his levothyroxine dose was changed from 112 mcg oral to 75 mcg IV.  He has a good appetite.  He was transferred to the medicine service on 3/21. His sister was at the bedside.   A chest x-ray showed a new left basilar infiltrate and he was started on Zosyn  His islas catheter was removed Sunday morning.     Objective     Physical Exam  General: sleeping, arousable, not in acute distress  Cardiovascular: regular, normal S1 and S2  Lungs: good air entry bilaterally with minimal basilar crackles on the right side.   Abdomen: soft, nontender, bowel sounds present, normoactive  Extremities: no peripheral cyanosis, no pedal edema  Neuro: sleeping, arousable      Last Recorded Vitals  Blood " "pressure 94/54, pulse 60, temperature 36.7 °C (98.1 °F), temperature source Axillary, resp. rate 18, height 1.499 m (4' 11\"), weight 60.9 kg (134 lb 4.2 oz), SpO2 95%.  Intake/Output last 3 Shifts:  I/O last 3 completed shifts:  In: 1463.3 (24 mL/kg) [P.O.:580; IV Piggyback:883.3]  Out: - (0 mL/kg)   Weight: 60.9 kg     Relevant Results  Lab Results   Component Value Date    WBC 10.2 03/25/2025    HGB 12.9 (L) 03/25/2025    HCT 38.0 (L) 03/25/2025    MCV 96 03/25/2025     03/25/2025     Lab Results   Component Value Date    GLUCOSE 95 03/25/2025    CALCIUM 8.1 (L) 03/25/2025     03/25/2025    K 3.9 03/25/2025    CO2 26 03/25/2025     (H) 03/25/2025    BUN 12 03/25/2025    CREATININE 1.28 03/25/2025     Scheduled medications  atorvastatin, 40 mg, oral, Nightly  cetirizine, 10 mg, oral, Daily  docusate sodium, 200 mg, oral, Daily  enoxaparin, 40 mg, subcutaneous, Daily  famotidine, 20 mg, oral, BID  fludrocortisone, 0.1 mg, oral, Daily  levothyroxine, 137 mcg, oral, Daily  piperacillin-tazobactam, 3.375 g, intravenous, q6h  polyethylene glycol, 17 g, oral, Daily      Continuous medications       PRN medications  PRN medications: acetaminophen, albuterol, alum-mag hydroxide-simeth, hydrOXYzine HCL        Assessment/Plan   Assessment & Plan    Syncope  2D echo showed a normal EF  Pacemaker was interrogated and showed no tachyarrhythmic events. .Syncope possibly secondary to hypotension  Started on florinef by cardiology    Acute kidney injury  Possibly secondary to hypotension  Resolved  IV fluids stopped  Avoid nephrotoxic medication    Constipation  General surgery team concluded that he did not have a cecal volvulus  Improved with laxatives    Abnormal chest x-ray  New left basilar infiltrate on 3/23, treating as bacterial pneumonia.     Hypotension  Improving  Florinef started by cardiology on 3/22    Hypothyroidism  On levothyroxine.  TSH was elevated at 15.8 on 3/18.  Levothyroxine was changed " (increased) from 112 mcg p.o. to 75 mcg IV by general surgeon  Switched back to oral at a dose of 137 mcg daily    Urinary retention  Villatoro catheter placed on 3/19, removed 3/23    Down's syndrome  Supportive care    Presence of cardiac pacemaker  Stable    Plan  Levothyroxine for hypothyroidism  Villatoro catheter was removed by urology for a voiding trial  Discharge planning.    At discharge, he will return to the group home on oral antibiotic: augmentin.   Await outcome of (urinary) voiding attempts. Urology on consult. Plan to discharge after conclusion of this and any recommendation by urology.       Iron Ramos MD

## 2025-03-25 NOTE — DISCHARGE SUMMARY
Discharge Diagnosis  Aspiration pneumonia   Orthostatic hypotension   Hypothyroidism  Urinary retention     Discharge Meds     Medication List      START taking these medications     amoxicillin-pot clavulanate 875-125 mg tablet; Commonly known as:   Augmentin; Take 1 tablet by mouth 2 times a day for 5 days.   fludrocortisone 0.1 mg tablet; Commonly known as: Florinef; Take 1   tablet (0.1 mg) by mouth once daily.; Start taking on: March 26, 2025     CHANGE how you take these medications     levothyroxine 137 mcg tablet; Commonly known as: Synthroid, Levoxyl;   Take 1 tablet (137 mcg) by mouth early in the morning..; Start taking on:   March 26, 2025; What changed: medication strength, See the new   instructions.     CONTINUE taking these medications     acetaminophen 500 mg tablet; Commonly known as: Tylenol; Take 1 tablet   (500 mg) by mouth every 6 hours if needed for mild pain (1 - 3). headache,   limping and fever of 100 degrees or higher   * albuterol 90 mcg/actuation inhaler; Commonly known as: Ventolin HFA;   Inhale 2 puffs every 4 hours if needed for wheezing.   * albuterol 2.5 mg /3 mL (0.083 %) nebulizer solution; INHALE CONTENTS   OF 1 VIAL ORALLY VIA NEBULIZER THREE TIMES DAILY DX: PNEUMONIA *12/9 # 10   VIALS IN HOME. SEE CUST SERV*   alum-mag hydroxide-simeth 200-200-20 mg/5 mL oral suspension; Commonly   known as: Mylanta   atorvastatin 20 mg tablet; Commonly known as: Lipitor; GIVE 1 TABLET BY   MOUTH ONCE DAILY DX: HIGH CHOLESTEROL   Aveeno Daily Moisturizing 1.2 % lotion; Generic drug:   dimethicone-colloidal oatmeaL; Apply 1 Application topically once daily.   To hands, feet and elbows for dryness   chlorhexidine 0.12 % solution; Commonly known as: Peridex; SWAB ONTO   TEETH AND GUMS TWICE DAILY FOR PREVENTION OF INFECTION *CALL PHARMACY FOR   REFILLS*   docusate sodium 250 mg capsule; GIVE 1 CAPSULE BY MOUTH ONCE DAILY DX:   CONSTIPATION   ergocalciferol 1250 mcg (50,000 units) capsule;  "Commonly known as:   Vitamin D-2; GIVE 1 CAPSULE BY MOUTH ONCE A WEEK SUNDAY DX: LOW VITAMIN D   *SEND 4 EVERY WK 4*   famotidine 20 mg tablet; Commonly known as: Pepcid; Take 1 tablet (20   mg) by mouth 2 times a day.   hydrocortisone 1 % cream   hydrOXYzine HCL 50 mg tablet; Commonly known as: Atarax; Take 2 tablets   (100 mg) by mouth every 6 hours if needed for anxiety.   loratadine 10 mg tablet; Commonly known as: Claritin; GIVE 1 TABLET BY   MOUTH ONCE DAILY FOR ALLERGIES/SNEEZING/RUNNY NOSE/ITCHY EYES   polyethylene glycol 17 gram packet; Commonly known as: Glycolax,   Miralax; Take 17 g by mouth once daily.  * This list has 2 medication(s) that are the same as other medications   prescribed for you. Read the directions carefully, and ask your doctor or   other care provider to review them with you.     STOP taking these medications     azithromycin 500 mg tablet; Commonly known as: Zithromax   cefuroxime 500 mg tablet; Commonly known as: Ceftin   levoFLOXacin 500 mg tablet; Commonly known as: Levaquin       Test Results Pending At Discharge  Pending Labs       No current pending labs.            Hospital Course   Cyril Hernandez \"Briana" is a 59 y.o. male with medical history of Down syndrome, nonverbal at baseline, hypertension, pacemaker presenting with a syncopal episode.  Patient lives at a group home and apparently was sitting down and went to stand up.  At that time patient fell down and landed on his bottom.  He did not hit his head.  At that time, he was hypotensive therefore he was sent to the emergency department. CT A/P demonstrated swirling of the right lower quadrant mesentery consistent with a cecal volvulus.  There is also mild dilatation of the ileal loops possibly indicating an early small bowel obstruction.  There is no free air.  Patient's sister is present at the bedside.    The patient was treated with IV zosyn for aspiration pneumonia. He was also started on florinef for orthostatic " hypotension. His synthroid dose was adjusted. He was noted to have urinary retention, urology was consulted. Initial islas was removed, he continues to have large residual but without renal dysfunction, cleared for discharge by urology with fu in 2-3 weeks. Flomax was not recommended due to orthostatic hypotension. He was discharged back to group home in a stable condition     Pertinent Physical Exam At Time of Discharge  Physical Exam  General: sleeping, arousable, not in acute distress  Cardiovascular: regular, normal S1 and S2  Lungs: good air entry bilaterally with minimal basilar crackles on the right side.   Abdomen: soft, nontender, bowel sounds present, normoactive  Extremities: no peripheral cyanosis, no pedal edema  Neuro: sleeping, arousable     Outpatient Follow-Up  Future Appointments   Date Time Provider Department Center   4/14/2025  3:30 PM Castro Garcia MD [id number]   9/9/2025 10:30 AM [id number] [place] Ramos MD

## 2025-03-25 NOTE — CARE PLAN
The patient is unable  to participate in goal setting     The clinical goals for the shift include remain free from falls

## 2025-03-25 NOTE — PROGRESS NOTES
"Cyril Hernandez \"Elliott\" is a 59 y.o. male on day 6 of admission presenting with Volvulus (Multi).    Subjective   He voided today.  PVR 1800  ml.  He has no complaints.    Objective     Physical Exam  Alert, no distress  Normal respiratory effort  Abdomen soft nontender   Brief in place.    Last Recorded Vitals  Blood pressure 86/50, pulse 62, temperature 36.4 °C (97.6 °F), temperature source Temporal, resp. rate 18, height 1.499 m (4' 11\"), weight 61.9 kg (136 lb 7.4 oz), SpO2 96%.  Intake/Output last 3 Shifts:  I/O last 3 completed shifts:  In: 1243.3 (20.1 mL/kg) [P.O.:360; IV Piggyback:883.3]  Out: - (0 mL/kg)   Weight: 61.9 kg     Relevant Results                 This patient has a urinary catheter   Reason for the urinary catheter remaining today? Urine catheter unnecessary, will be removed today               Assessment/Plan   Assessment & Plan  Volvulus (Multi)    59-year-old has severe urinary retention with 2700 mL in his bladder upon catheter placement.      He continues with a voiding trial currently.  Will hold on tamsulosin with his low blood pressure.  He is seen with his sister tonight.  We will continue to hold on catheter placement, pending his renal function tomorrow.  Anticipate discharge soon.                Castro Garcia MD      "

## 2025-03-25 NOTE — PROGRESS NOTES
03/25/25 1101   Discharge Planning   Type of Residence Group home   Who is requesting discharge planning? Provider   Home or Post Acute Services None   Expected Discharge Disposition Home   Does the patient need discharge transport arranged? No     Patient not cleared by urology. Voiding trial. When medically clear pt will return to group home. Family to transport. Call Tiffanie (caregiver) to notify [phone].    DISCHARGE PLAN TO RETURN TO GROUP HOME WHEN MED CLEAR.

## 2025-03-26 ENCOUNTER — PATIENT OUTREACH (OUTPATIENT)
Dept: PRIMARY CARE | Facility: CLINIC | Age: 60
End: 2025-03-26
Payer: MEDICARE

## 2025-03-26 DIAGNOSIS — K21.9 CHRONIC GERD: ICD-10-CM

## 2025-03-26 RX ORDER — FAMOTIDINE 20 MG/1
20 TABLET, FILM COATED ORAL 2 TIMES DAILY
Qty: 180 TABLET | Refills: 1 | Status: SHIPPED | OUTPATIENT
Start: 2025-03-26 | End: 2025-09-22

## 2025-03-26 NOTE — PROGRESS NOTES
Discharge Facility: [facility]  Discharge Diagnosis: Aspiration pneumonia , Orthostatic hypotension , Hypothyroidism, Urinary retention  Admission Date: 03/18/2025  Discharge Date: 03/25/2025    PCP Appointment Date: 04/02/2025, scheduled by office  Specialist Appointment Date: Urology 04/14/2025, Cardio 09/09/2025  Hospital Encounter and Summary Linked: Yes  ED to Hosp-Admission (Discharged) with Iron Ramos MD; Main Jeffrey DO (03/18/2025)     Two attempts were made to reach patient within two business days after discharge. Voicemail left with contact information for patient to call back with any non-emergent questions or concerns.

## 2025-04-01 DIAGNOSIS — J42 CHRONIC BRONCHITIS, UNSPECIFIED CHRONIC BRONCHITIS TYPE (MULTI): ICD-10-CM

## 2025-04-02 ENCOUNTER — HOSPITAL ENCOUNTER (OUTPATIENT)
Dept: RADIOLOGY | Facility: CLINIC | Age: 60
Discharge: HOME | End: 2025-04-02
Payer: MEDICARE

## 2025-04-02 ENCOUNTER — OFFICE VISIT (OUTPATIENT)
Dept: PRIMARY CARE | Facility: CLINIC | Age: 60
End: 2025-04-02
Payer: MEDICARE

## 2025-04-02 VITALS
HEIGHT: 60 IN | OXYGEN SATURATION: 90 % | RESPIRATION RATE: 16 BRPM | WEIGHT: 135 LBS | DIASTOLIC BLOOD PRESSURE: 60 MMHG | HEART RATE: 65 BPM | BODY MASS INDEX: 26.5 KG/M2 | SYSTOLIC BLOOD PRESSURE: 102 MMHG | TEMPERATURE: 97.3 F

## 2025-04-02 DIAGNOSIS — J18.9 PNEUMONIA OF RIGHT LOWER LOBE DUE TO INFECTIOUS ORGANISM: Primary | ICD-10-CM

## 2025-04-02 DIAGNOSIS — J18.9 PNEUMONIA OF RIGHT LOWER LOBE DUE TO INFECTIOUS ORGANISM: ICD-10-CM

## 2025-04-02 DIAGNOSIS — I95.1 ORTHOSTATIC HYPOTENSION: ICD-10-CM

## 2025-04-02 PROCEDURE — 99495 TRANSJ CARE MGMT MOD F2F 14D: CPT | Performed by: FAMILY MEDICINE

## 2025-04-02 PROCEDURE — 71046 X-RAY EXAM CHEST 2 VIEWS: CPT

## 2025-04-02 PROCEDURE — 3008F BODY MASS INDEX DOCD: CPT | Performed by: FAMILY MEDICINE

## 2025-04-02 PROCEDURE — G8433 SCR FOR DEP NOT CPT DOC RSN: HCPCS | Performed by: FAMILY MEDICINE

## 2025-04-02 PROCEDURE — 3074F SYST BP LT 130 MM HG: CPT | Performed by: FAMILY MEDICINE

## 2025-04-02 PROCEDURE — 3078F DIAST BP <80 MM HG: CPT | Performed by: FAMILY MEDICINE

## 2025-04-02 RX ORDER — ALBUTEROL SULFATE 0.83 MG/ML
2.5 SOLUTION RESPIRATORY (INHALATION) 3 TIMES DAILY
Qty: 270 ML | Refills: 2 | Status: SHIPPED | OUTPATIENT
Start: 2025-04-02

## 2025-04-02 ASSESSMENT — PAIN SCALES - GENERAL: PAINLEVEL_OUTOF10: 0-NO PAIN

## 2025-04-02 ASSESSMENT — ENCOUNTER SYMPTOMS
CARDIOVASCULAR NEGATIVE: 1
CONSTITUTIONAL NEGATIVE: 1
RESPIRATORY NEGATIVE: 1
MUSCULOSKELETAL NEGATIVE: 1
DEPRESSION: 0
OCCASIONAL FEELINGS OF UNSTEADINESS: 0
LOSS OF SENSATION IN FEET: 0
NEUROLOGICAL NEGATIVE: 1
GASTROINTESTINAL NEGATIVE: 1

## 2025-04-02 NOTE — PROGRESS NOTES
"Subjective   Patient ID: Elliott Hernandez is a 59 y.o. male who presents for Hospital Follow-up (Pt is here for a hospital follow up of syncope and bowel obstruction from 3/18/-3/25.).    HPI  pt admitted 3/18 to 3/25 with hospital course as follows:     Cyril Hernandez \"Briana" is a 59 y.o. male with medical history of Down syndrome, nonverbal at baseline, hypertension, pacemaker presenting with a syncopal episode.  Patient lives at a group home and apparently was sitting down and went to stand up.  At that time patient fell down and landed on his bottom.  He did not hit his head.  At that time, he was hypotensive therefore he was sent to the emergency department. CT A/P demonstrated swirling of the right lower quadrant mesentery consistent with a cecal volvulus.  There is also mild dilatation of the ileal loops possibly indicating an early small bowel obstruction.  There is no free air.  Patient's sister is present at the bedside.    The patient was treated with IV zosyn for aspiration pneumonia. He was also started on florinef for orthostatic hypotension. His synthroid dose was adjusted. He was noted to have urinary retention, urology was consulted. Initial islas was removed, he continues to have large residual but without renal dysfunction, cleared for discharge by urology with fu in 2-3 weeks. Flomax was not recommended due to orthostatic hypotension. He was discharged back to group home in a stable condition   Review of Systems   Constitutional: Negative.    HENT: Negative.     Respiratory: Negative.     Cardiovascular: Negative.    Gastrointestinal: Negative.    Genitourinary: Negative.    Musculoskeletal: Negative.    Neurological: Negative.        Objective   /60   Pulse 65   Temp 36.3 °C (97.3 °F) (Temporal)   Resp 16   Ht 1.499 m (4' 11\")   Wt 61.2 kg (135 lb)   SpO2 90%   BMI 27.27 kg/m²     Physical Exam  Constitutional:       General: He is not in acute distress.     Appearance: Normal " appearance.   Cardiovascular:      Rate and Rhythm: Normal rate and regular rhythm.      Heart sounds: No murmur heard.  Pulmonary:      Breath sounds: Normal breath sounds. No wheezing.   Neurological:      Mental Status: He is alert.         Assessment/Plan   Problem List Items Addressed This Visit    None  Visit Diagnoses         Codes    Pneumonia of right lower lobe due to infectious organism    -  Primary J18.9    Relevant Orders    XR chest 2 views (Completed)    Orthostatic hypotension     I95.1          Continue current meds and follow up with cardiology as scheduled.  Recheck xr today.  Will call with results.

## 2025-04-04 ENCOUNTER — PATIENT OUTREACH (OUTPATIENT)
Dept: PRIMARY CARE | Facility: CLINIC | Age: 60
End: 2025-04-04
Payer: MEDICARE

## 2025-04-04 ENCOUNTER — TELEPHONE (OUTPATIENT)
Dept: PRIMARY CARE | Facility: CLINIC | Age: 60
End: 2025-04-04
Payer: MEDICARE

## 2025-04-04 DIAGNOSIS — R05.8 OTHER COUGH: ICD-10-CM

## 2025-04-04 NOTE — PROGRESS NOTES
Unable to reach Zoila for call back after recent hospitalization.   Number for Zoila is out of service

## 2025-04-14 ENCOUNTER — APPOINTMENT (OUTPATIENT)
Dept: UROLOGY | Facility: CLINIC | Age: 60
End: 2025-04-14
Payer: MEDICARE

## 2025-04-14 DIAGNOSIS — R33.9 URINARY RETENTION: ICD-10-CM

## 2025-04-14 DIAGNOSIS — N13.39 OTHER HYDRONEPHROSIS: Primary | ICD-10-CM

## 2025-04-14 DIAGNOSIS — R39.9 URINARY SYMPTOM OR SIGN: ICD-10-CM

## 2025-04-14 DIAGNOSIS — N19 RENAL FAILURE, UNSPECIFIED CHRONICITY: ICD-10-CM

## 2025-04-14 PROBLEM — N13.30 HYDRONEPHROSIS: Status: ACTIVE | Noted: 2025-04-14

## 2025-04-14 PROCEDURE — G2211 COMPLEX E/M VISIT ADD ON: HCPCS | Performed by: UROLOGY

## 2025-04-14 PROCEDURE — 99214 OFFICE O/P EST MOD 30 MIN: CPT | Performed by: UROLOGY

## 2025-04-14 PROCEDURE — 1036F TOBACCO NON-USER: CPT | Performed by: UROLOGY

## 2025-04-14 NOTE — PROGRESS NOTES
Patient is a 59 y.o. male presenting as a new patient with urinary retention.    SUBJECTIVE:  HPI   He is seen following hospitalization after a syncopal event 3/18/25.  He had urinary retention of 2700 mL and a catheter was placed.  His sister is in attendance.   She reports he has a decent stream with large void volumes.   Urine is light without odor.    He has history of Down syndrome, nonverbal at baseline.  SH: never smoker.    Allergies   Allergen Reactions    Bumetanide Diarrhea    Inhaled Anesthetics (Halogen Based) Unknown    Peas Unknown    Sulfa (Sulfonamide Antibiotics) Hives    Chocolate Hazelnut Flavor Diarrhea     Past Medical History:   Diagnosis Date    H/O barium enema 03/2025    Hypoxemia 04/16/2015    Hypoxia     No past surgical history on file.     Review of Systems   Constitutional: denies any unintentional weight loss or change in strength.  Integumentary: denies any rashes or pruritus.  Eyes: denies any double vision or eye pain.  Ear/Nose/Mouth/Throat: denies any nosebleeds or gum bleeds.  Cardiovascular: denies any chest pain or syncope.  Respiratory: denies hemoptysis.  Gastrointestinal: denies nausea or vomiting.  Musculoskeletal: denies muscle cramping or weakness.  Neurologic: denies convulsions or seizures.  Hematologic/Lymphatic: denies bleeding tendencies.  Endocrine: denies heat/cold intolerance.  All other systems have been reviewed and are negative unless otherwise noted in the HPI.    OBJECTIVE:  There were no vitals taken for this visit.  Physical Exam   Constitutional: No obvious distress.  Eyes: Non-injected conjunctiva, sclera clear, EOMI.  Ears/Nose/Mouth/Throat: No obvious drainage per ears or nose.  Cardiovascular: Extremities are warm and well perfused. No edema, cyanosis or pallor.  Respiratory: No audible wheezing/stridor; respirations do not appear labored.  Gastrointestinal: Abdomen soft, not distended.  Musculoskeletal: Normal ROM of extremities.  Skin: No obvious  rashes or open sores.  Neurologic: Alert and oriented, CN 2-12 grossly intact.  Psychiatric: Answers questions appropriately with normal affect.  Hematologic/Lymphatic/Immunologic: No obvious bruises or sites of spontaneous bleeding.  Genitourinary: No CVA tenderness, bladder not palpable.     LABS:  Lab Results   Component Value Date    WBC 10.2 03/25/2025    HGB 12.9 (L) 03/25/2025    HCT 38.0 (L) 03/25/2025    MCV 96 03/25/2025     03/25/2025    GLUCOSE 95 03/25/2025    CALCIUM 8.1 (L) 03/25/2025     03/25/2025    K 3.9 03/25/2025    CO2 26 03/25/2025     (H) 03/25/2025    BUN 12 03/25/2025    CREATININE 1.28 03/25/2025    EGFR 64 03/25/2025    PSA <0.10 08/27/2024    URINECULTURE No significant growth 08/27/2024     IMAGING:      PROCEDURES:  PVR 1302  mL  4/14/25    ASSESSMENT/PLAN:  Problem List Items Addressed This Visit    None  Visit Diagnoses       Urinary symptom or sign        Relevant Orders    Measure post void residual (Completed)    Urinary retention        Relevant Orders    Basic metabolic panel           He has significant urinary retention. A catheter was placed on 3/21/25 draining 2700 mL.  We have reviewed options with his POA and the catheter will remain out if possible to maintain a good quality of life.    CT scan 3/19/25 identified a significantly distended bladder and right-sided hydronephrosis. Renal ultrasound 3/21/25 did not identify hydronephrosis.    His kidney function will be followed. He will repeat BMP.  Creatinine   3/25/25 1.28  03/24/25 1.27  03/23/25 1.10    Urinalysis 3/17/25  identified 6-10 RBCs and 1-5 WBCs per high-power field     He will follow up in 6 months.    All questions were answered to the patient’s satisfaction.  Patient agrees with the plan and wishes to proceed.  Follow-up will be scheduled appropriately.     Scribe Attestation  By signing my name below, IAissatou Scribe,   attest that this documentation has been prepared under the  direction and in the presence of Castro Garcia MD.

## 2025-04-14 NOTE — PATIENT INSTRUCTIONS
Your provider has ordered blood work to be drawn.   has partnered with [employer], your orders can be sent electronically.  If you choose to use an outside lab, please take a copy of your lab requisite with you to your lab of choice and have them fax results to Dr. Garcia at [phone].    Follow up in office in 1 month

## 2025-04-15 DIAGNOSIS — R55 SYNCOPE, UNSPECIFIED SYNCOPE TYPE: ICD-10-CM

## 2025-04-16 RX ORDER — FLUDROCORTISONE ACETATE 0.1 MG/1
0.1 TABLET ORAL DAILY
Qty: 30 TABLET | Refills: 1 | OUTPATIENT
Start: 2025-04-16 | End: 2025-06-15

## 2025-04-24 DIAGNOSIS — R55 SYNCOPE, UNSPECIFIED SYNCOPE TYPE: ICD-10-CM

## 2025-04-25 DIAGNOSIS — R55 SYNCOPE, UNSPECIFIED SYNCOPE TYPE: ICD-10-CM

## 2025-04-25 RX ORDER — FLUDROCORTISONE ACETATE 0.1 MG/1
0.1 TABLET ORAL DAILY
Qty: 30 TABLET | Refills: 11 | Status: SHIPPED | OUTPATIENT
Start: 2025-04-25 | End: 2026-04-20

## 2025-04-25 RX ORDER — FLUDROCORTISONE ACETATE 0.1 MG/1
0.1 TABLET ORAL DAILY
Qty: 30 TABLET | Refills: 11 | Status: SHIPPED | OUTPATIENT
Start: 2025-04-25 | End: 2025-04-25 | Stop reason: SDUPTHER

## 2025-04-29 DIAGNOSIS — E03.9 ACQUIRED HYPOTHYROIDISM: ICD-10-CM

## 2025-04-29 RX ORDER — LEVOTHYROXINE SODIUM 137 UG/1
137 TABLET ORAL DAILY
Qty: 90 TABLET | Refills: 3 | Status: SHIPPED | OUTPATIENT
Start: 2025-04-29 | End: 2026-04-24

## 2025-05-15 ENCOUNTER — OFFICE VISIT (OUTPATIENT)
Facility: CLINIC | Age: 60
End: 2025-05-15
Payer: MEDICARE

## 2025-05-15 VITALS — SYSTOLIC BLOOD PRESSURE: 90 MMHG | HEART RATE: 65 BPM | DIASTOLIC BLOOD PRESSURE: 56 MMHG

## 2025-05-15 DIAGNOSIS — R55 VASOVAGAL SYNCOPE: Primary | ICD-10-CM

## 2025-05-15 PROCEDURE — 99213 OFFICE O/P EST LOW 20 MIN: CPT | Performed by: INTERNAL MEDICINE

## 2025-05-15 PROCEDURE — 3078F DIAST BP <80 MM HG: CPT | Performed by: INTERNAL MEDICINE

## 2025-05-15 PROCEDURE — 3074F SYST BP LT 130 MM HG: CPT | Performed by: INTERNAL MEDICINE

## 2025-05-15 PROCEDURE — G8433 SCR FOR DEP NOT CPT DOC RSN: HCPCS | Performed by: INTERNAL MEDICINE

## 2025-05-15 NOTE — ASSESSMENT & PLAN NOTE
History as above.  Follow-up in device clinic.  I would consider this problem with hypotension persistent in the future.

## 2025-05-15 NOTE — PROGRESS NOTES
Chief Complaint   Patient presents with    Hypotension        The patient is well-known to me.  His 59-year-old male with a history of transient complete AV block and sinus bradycardia post dual-chamber pacemaker implanted in April 2023 he has also had hypotension despite no antihypertensive medications on board.  This is all in the setting of significant MRDS and the patient is nonverbal.  All the history is obtained from his caregiver and the electronic medical record.    He was recently admitted to the hospital with hypotension in the setting of pneumonia.  He has completely recovered.  He was transiently treated with fludrocortisone but had lower extremity edema and as such that was discontinued however he continues to be hypotensive.  However based on his caregiver's assessment he is back to his normal self.  His device interrogation recently was completely normal           Active Ambulatory Problems     Diagnosis Date Noted    Vitamin D deficiency 09/01/2023    Vasovagal syncope 09/01/2023    Syncope 09/01/2023    Stage 3 chronic kidney disease (Multi) 09/01/2023    Renal failure 09/01/2023    Psoriasis, unspecified 09/12/2018    Severe intellectual disability 09/12/2018    Severe intellectual disability 01/07/2020    Atypical pneumonia 09/01/2023    Paronychia, finger 01/07/2020    Paronychia of thumb, left 09/01/2023    Obesity 09/01/2023    Monocytosis 02/19/2021    Leukopenia 09/01/2023    Injury of head 09/01/2023    Infiltrate of lung present on imaging study 12/04/2018    Impaired fasting glucose 09/01/2023    Hypoxia 09/01/2023    Hypoxemia 09/01/2023    Hyperlipidemia 09/01/2023    Heart murmur 09/01/2023    Fall 09/01/2023    Essential (primary) hypertension 09/01/2023    Enlarged heart 09/01/2023    Elevated serum protein level 10/05/2020    Dyspnea 09/01/2023    Down syndrome, unspecified (HHS-HCC) 06/06/2018    Complete trisomy 21 syndrome (HHS-HCC) 09/01/2023    Complete heart block 09/01/2023     Clouded consciousness 09/01/2023    Gastroesophageal reflux disease 09/01/2023    Chronic GERD 09/01/2023    Cellulitis 09/01/2023    Cardiac pacemaker 09/01/2023    Bradycardia 09/01/2023    Blunt injury 09/01/2023    Anxiety 09/01/2023    Acute gout 09/01/2023    Acquired hypothyroidism 06/11/2019    Abscess of thumbnail of left hand 09/01/2023    Abscess of finger 09/01/2023    Epistaxis 09/01/2023    Multiple food allergies 09/01/2023    Profound intellectual disability 09/01/2023    Aspiration pneumonia (Multi) 10/20/2024    Cecal volvulus (Multi) 03/17/2025    Partial intestinal obstruction (Multi) 03/17/2025    Proctitis 03/17/2025    Volvulus (Multi) 03/18/2025    Urinary retention 04/14/2025    Hydronephrosis 04/14/2025     Resolved Ambulatory Problems     Diagnosis Date Noted    No Resolved Ambulatory Problems     Past Medical History:   Diagnosis Date    H/O barium enema 03/2025        Review of Systems   All other systems reviewed and are negative.         Current Outpatient Medications   Medication Instructions    acetaminophen (TYLENOL) 500 mg, oral, Every 6 hours PRN, headache, limping and fever of 100 degrees or higher    albuterol 2.5 mg, nebulization, 3 times daily    atorvastatin (Lipitor) 20 mg tablet GIVE 1 TABLET BY MOUTH ONCE DAILY DX: HIGH CHOLESTEROL    chlorhexidine (Peridex) 0.12 % solution SWAB ONTO TEETH AND GUMS TWICE DAILY FOR PREVENTION OF INFECTION *CALL PHARMACY FOR REFILLS*    dimethicone-colloidal oatmeaL (Aveeno Daily Moisturizing) 1.2 % lotion 1 Application, Topical, Daily, To hands, feet and elbows for dryness    docusate sodium 250 mg capsule GIVE 1 CAPSULE BY MOUTH ONCE DAILY DX: CONSTIPATION    ergocalciferol (Vitamin D-2) 1.25 MG (48898 UT) capsule GIVE 1 CAPSULE BY MOUTH ONCE A WEEK SUNDAY DX: LOW VITAMIN D *SEND 4 EVERY WK 4*    famotidine (PEPCID) 20 mg, oral, 2 times daily    fludrocortisone (FLORINEF) 0.1 mg, oral, Daily    levothyroxine (SYNTHROID, LEVOXYL) 137 mcg,  oral, Daily    loratadine (Claritin) 10 mg tablet GIVE 1 TABLET BY MOUTH ONCE DAILY FOR ALLERGIES/SNEEZING/RUNNY NOSE/ITCHY EYES    polyethylene glycol (GLYCOLAX, MIRALAX) 17 g, oral, Daily       Objective     Vitals:    05/15/25 0839   BP: 90/56   Pulse: 65        Vitals and nursing note reviewed.   Constitutional:       Appearance: Healthy appearance.   HENT:    Mouth/Throat:      Pharynx: Oropharynx is clear.   Pulmonary:      Effort: Pulmonary effort is normal.      Breath sounds: Normal breath sounds.   Cardiovascular:      PMI at left midclavicular line. Normal rate. Regular rhythm. Normal S1. Normal S2.       Murmurs: There is a grade 1/6 holosystolic murmur.      No gallop.  No click. No rub.   Pulses:     Intact distal pulses.   Edema:     Peripheral edema absent.   Abdominal:      General: Bowel sounds are normal.   Musculoskeletal:      Cervical back: Normal range of motion. Skin:     General: Skin is warm and dry.   Neurological:      General: No focal deficit present.      Mental Status: Alert and oriented to person, place and time.            Lab Review:   No results displayed because visit has over 200 results.      Admission on 03/17/2025, Discharged on 03/18/2025   Component Date Value    Ventricular Rate 03/17/2025 63     Atrial Rate 03/17/2025 63     HI Interval 03/17/2025 178     QRS Duration 03/17/2025 114     QT Interval 03/17/2025 424     QTC Calculation(Bazett) 03/17/2025 433     P Axis 03/17/2025 42     R Axis 03/17/2025 7     T Axis 03/17/2025 22     QRS Count 03/17/2025 10     Q Onset 03/17/2025 216     P Onset 03/17/2025 147     P Offset 03/17/2025 167     T Offset 03/17/2025 428     QTC Fredericia 03/17/2025 431     WBC 03/17/2025 3.6 (L)     nRBC 03/17/2025 0.0     RBC 03/17/2025 4.70     Hemoglobin 03/17/2025 15.1     Hematocrit 03/17/2025 45.5     MCV 03/17/2025 97     MCH 03/17/2025 32.1     MCHC 03/17/2025 33.2     RDW 03/17/2025 15.0 (H)     Platelets 03/17/2025 185     Neutrophils  % 03/17/2025 59.1     Immature Granulocytes %,* 03/17/2025 2.8 (H)     Lymphocytes % 03/17/2025 18.2     Monocytes % 03/17/2025 17.1     Eosinophils % 03/17/2025 1.1     Basophils % 03/17/2025 1.7     Neutrophils Absolute 03/17/2025 2.14     Immature Granulocytes Ab* 03/17/2025 0.10     Lymphocytes Absolute 03/17/2025 0.66 (L)     Monocytes Absolute 03/17/2025 0.62     Eosinophils Absolute 03/17/2025 0.04     Basophils Absolute 03/17/2025 0.06     Glucose 03/17/2025 91     Sodium 03/17/2025 142     Potassium 03/17/2025 4.4     Chloride 03/17/2025 105     Bicarbonate 03/17/2025 29     Anion Gap 03/17/2025 12     Urea Nitrogen 03/17/2025 21     Creatinine 03/17/2025 1.39 (H)     eGFR 03/17/2025 58 (L)     Calcium 03/17/2025 9.1     Albumin 03/17/2025 3.6     Alkaline Phosphatase 03/17/2025 64     Total Protein 03/17/2025 6.8     AST 03/17/2025 21     Bilirubin, Total 03/17/2025 0.5     ALT 03/17/2025 29     Flu A Result 03/17/2025 Not Detected     Flu B Result 03/17/2025 Not Detected     Coronavirus 2019, PCR 03/17/2025 Not Detected     Troponin I, High Sensiti* 03/17/2025 3     Troponin I, High Sensiti* 03/17/2025 3     Color, Urine 03/17/2025 Light-Yellow     Appearance, Urine 03/17/2025 Clear     Specific Gravity, Urine 03/17/2025 1.010     pH, Urine 03/17/2025 6.5     Protein, Urine 03/17/2025 NEGATIVE     Glucose, Urine 03/17/2025 Normal     Blood, Urine 03/17/2025 0.1 (1+) (A)     Ketones, Urine 03/17/2025 NEGATIVE     Bilirubin, Urine 03/17/2025 NEGATIVE     Urobilinogen, Urine 03/17/2025 Normal     Nitrite, Urine 03/17/2025 NEGATIVE     Leukocyte Esterase, Urine 03/17/2025 NEGATIVE     WBC, Urine 03/17/2025 1-5     RBC, Urine 03/17/2025 6-10 (A)     Mucus, Urine 03/17/2025 FEW     Lactate 03/17/2025 0.7     Blood Culture 03/17/2025 No growth at 4 days -  FINAL REPORT     Blood Culture 03/17/2025 No growth at 4 days -  FINAL REPORT                Assessment/plan:  History as above.  Follow-up in device clinic.   I would consider this problem with hypotension persistent in the future.    Problem List Items Addressed This Visit    None     Adrián Spence MD

## 2025-05-16 LAB
ANION GAP SERPL CALCULATED.4IONS-SCNC: 9 MMOL/L (CALC) (ref 7–17)
BUN SERPL-MCNC: 18 MG/DL (ref 7–25)
BUN/CREAT SERPL: NORMAL (CALC) (ref 6–22)
CALCIUM SERPL-MCNC: 9.2 MG/DL (ref 8.6–10.3)
CHLORIDE SERPL-SCNC: 104 MMOL/L (ref 98–110)
CO2 SERPL-SCNC: 28 MMOL/L (ref 20–32)
CREAT SERPL-MCNC: 1.24 MG/DL (ref 0.7–1.35)
EGFRCR SERPLBLD CKD-EPI 2021: 67 ML/MIN/1.73M2
GLUCOSE SERPL-MCNC: 84 MG/DL (ref 65–139)
POTASSIUM SERPL-SCNC: 4.2 MMOL/L (ref 3.5–5.3)
SODIUM SERPL-SCNC: 141 MMOL/L (ref 135–146)

## 2025-05-21 ENCOUNTER — OFFICE VISIT (OUTPATIENT)
Dept: PRIMARY CARE | Facility: CLINIC | Age: 60
End: 2025-05-21
Payer: MEDICARE

## 2025-05-21 VITALS — WEIGHT: 135 LBS | RESPIRATION RATE: 16 BRPM | BODY MASS INDEX: 26.5 KG/M2 | HEIGHT: 60 IN | TEMPERATURE: 97 F

## 2025-05-21 DIAGNOSIS — K62.5 RECTAL BLEEDING: Primary | ICD-10-CM

## 2025-05-21 PROCEDURE — G8433 SCR FOR DEP NOT CPT DOC RSN: HCPCS | Performed by: FAMILY MEDICINE

## 2025-05-21 PROCEDURE — 99213 OFFICE O/P EST LOW 20 MIN: CPT | Performed by: FAMILY MEDICINE

## 2025-05-21 PROCEDURE — 3008F BODY MASS INDEX DOCD: CPT | Performed by: FAMILY MEDICINE

## 2025-05-21 PROCEDURE — G2211 COMPLEX E/M VISIT ADD ON: HCPCS | Performed by: FAMILY MEDICINE

## 2025-05-21 ASSESSMENT — COLUMBIA-SUICIDE SEVERITY RATING SCALE - C-SSRS
6. HAVE YOU EVER DONE ANYTHING, STARTED TO DO ANYTHING, OR PREPARED TO DO ANYTHING TO END YOUR LIFE?: NO
2. HAVE YOU ACTUALLY HAD ANY THOUGHTS OF KILLING YOURSELF?: NO
1. IN THE PAST MONTH, HAVE YOU WISHED YOU WERE DEAD OR WISHED YOU COULD GO TO SLEEP AND NOT WAKE UP?: NO

## 2025-05-21 ASSESSMENT — PAIN SCALES - GENERAL: PAINLEVEL_OUTOF10: 0-NO PAIN

## 2025-05-21 ASSESSMENT — ENCOUNTER SYMPTOMS
OCCASIONAL FEELINGS OF UNSTEADINESS: 1
DEPRESSION: 0
LOSS OF SENSATION IN FEET: 0

## 2025-05-21 NOTE — PROGRESS NOTES
"Subjective   Patient ID: Elliott Hernandez is a 59 y.o. male who presents for Rectal Bleeding (Pt is here with caregiver and has concerns of blood in pts stool.).    HPI   Pt presents with 2 caregivers today stating pt was noted to have a small amt of bright red blood on tissue at his workshop when they wiped him   Review of Systems    Objective   Temp 36.1 °C (97 °F) (Temporal)   Resp 16   Ht 1.499 m (4' 11\")   Wt 61.2 kg (135 lb) Comment: unable  BMI 27.27 kg/m²     Physical Exam    Assessment/Plan   {Assess/PlanSmartLinks:92644}       "

## 2025-05-22 ENCOUNTER — OFFICE VISIT (OUTPATIENT)
Dept: URGENT CARE | Age: 60
End: 2025-05-22
Payer: MEDICARE

## 2025-05-22 ENCOUNTER — TELEPHONE (OUTPATIENT)
Dept: PRIMARY CARE | Facility: CLINIC | Age: 60
End: 2025-05-22
Payer: MEDICARE

## 2025-05-22 VITALS
DIASTOLIC BLOOD PRESSURE: 64 MMHG | RESPIRATION RATE: 19 BRPM | BODY MASS INDEX: 27.27 KG/M2 | OXYGEN SATURATION: 96 % | SYSTOLIC BLOOD PRESSURE: 104 MMHG | TEMPERATURE: 97.8 F | WEIGHT: 135 LBS | HEART RATE: 80 BPM

## 2025-05-22 DIAGNOSIS — B34.8 RHINOVIRUS INFECTION: ICD-10-CM

## 2025-05-22 DIAGNOSIS — R21 RASH AND NONSPECIFIC SKIN ERUPTION: Primary | ICD-10-CM

## 2025-05-22 LAB
POC HUMAN RHINOVIRUS PCR: POSITIVE
POC INFLUENZA A VIRUS PCR: NEGATIVE
POC INFLUENZA B VIRUS PCR: NEGATIVE
POC RESPIRATORY SYNCYTIAL VIRUS PCR: NEGATIVE
POC STREPTOCOCCUS PYOGENES (GROUP A STREP) PCR: NEGATIVE

## 2025-05-22 NOTE — PROGRESS NOTES
"Subjective   Patient ID: Cyril Hernandez \"Elliott\" is a 59 y.o. male. They present today with a chief complaint of Rash (Pt has rash on face going down collar since this morning ).    History of Present Illness  Patient is a 59-year-old male presents today with the caregiver due to onset of a rash since this morning.  Per caregiver, patient is nonverbal, reports rash began on his head spreading down to his face over his neck shoulders and chest.  Rash does not appear to be pruritic and does not seem to bother the patient.  Patient does not have any associated symptoms of upper respiratory infection including fever, chills, runny nose, sore throat, ear pain, cough, vomiting or diarrhea.  Per caregiver, no changes to detergents, lotion, foods, or medications in the recent time.    Past Medical History  Allergies as of 05/22/2025 - Reviewed 05/22/2025   Allergen Reaction Noted    Bumetanide Diarrhea 04/02/2025    Inhaled anesthetics (halogen based) Unknown 09/01/2023    Peas Unknown 07/26/2024    Sulfa (sulfonamide antibiotics) Hives 09/01/2023    Chocolate hazelnut flavor Diarrhea 07/26/2024       Prescriptions Prior to Admission[1]     Medical History[2]    Surgical History[3]     reports that he has never smoked. He has never used smokeless tobacco. He reports that he does not drink alcohol and does not use drugs.    Review of Systems  Review of Systems   Skin:  Positive for rash.   All other systems reviewed and are negative.                                 Objective    Vitals:    05/22/25 1545   BP: 104/64   BP Location: Left arm   Patient Position: Sitting   BP Cuff Size: Large adult   Pulse: 80   Resp: 19   Temp: 36.6 °C (97.8 °F)   TempSrc: Oral   SpO2: 96%   Weight: 61.2 kg (135 lb)     No LMP for male patient.    Physical Exam  Vitals reviewed.   General: Vitals Noted. No distress. Normocephalic.  Cardiovascular:     Heart sounds: Normal heart sounds, S1 normal and S2 normal. No murmur heard.     No friction " rub.   Pulmonary:      Effort: Pulmonary effort is normal.      Breath sounds:  Lungs clear to auscultation bilaterally   HEENT: Left and right TM is within normal limits. No drainage.  EOMI, normal conjunctiva, patent nares, Normal OP No maxillary and frontal sinus tenderness on palpation is present. Pharynx and tonsils are not hyperemic, and without exudate.   Neck: Supple with no adenopathy.  Lymphadenopathy:   No cervical adenopathy on palpation  Lower Body: No right inguinal adenopathy. No left inguinal adenopathy.   Abdominal:      Palpations: There is no hepatomegaly, splenomegaly or mass. Abdomen is soft, non-tender, and non-distended. No suprapubic tenderness. No CVA tenderness.   Skin:     Comments: Diffused over the head face neck and shoulders macular flat rash without raised or conducted distribution without presence of the drainage  Neurological:      Cranial Nerves: Cranial nerves 2-12 are intact.      Sensory: No sensory deficit.      Motor: Motor function is intact.      Deep Tendon Reflexes: Reflexes are normal and symmetric.     Procedures    Point of Care Test & Imaging Results from this visit  Results for orders placed or performed in visit on 05/22/25   POCT SPOTFIRE R/ST Panel [facility] w/Strep [facility] manually resulted   Result Value Ref Range    POC Group A Strep, PCR Negative Negative    POC Respiratory Syncytial Virus PCR Negative Negative    POC Influenza A Virus PCR Negative Negative    POC Influenza B Virus PCR Negative Negative    POC Human Rhinovirus PCR Positive (A) Negative      Imaging  No results found.    Cardiology, Vascular, and Other Imaging  No other imaging results found for the past 2 days      Diagnostic study results (if any) were reviewed by PATRICIA Maria.    Assessment/Plan   Allergies, medications, history, and pertinent labs/EKGs/Imaging reviewed by PATRICIA Maria.     Medical Decision Making  Patient is alert and oriented x 3 in no acute distress  presents with his caregiver with concerns of rash over his head face neck shoulders and chest started today.  On presentation examination diffuse maculopapular nonraised connected rash appears over patient's head face neck shoulders and chest.  Rash is nonpruritic in nature.  No drainage is present with no signs of secondary infection.  Negative findings on remaining of examination with lungs clear to auscultation bilaterally.  Spot fire strep done in office today and is positive for rhinovirus.  Patient and caregiver were instructed on in-home systemic approach for symptom relief.  Caregiver verbalized understanding and agreed with the plan.  Significant signs and symptoms warranting presentation to ER were discussed with the patient and caregiver today.    Orders and Diagnoses  Diagnoses and all orders for this visit:  Rash and nonspecific skin eruption  -     POCT SPOTFIRE R/ST Panel Mini w/Strep A ([facility]) manually resulted  Rhinovirus infection  -     POCT SPOTFIRE R/ST Panel Mini w/Strep A ([facility]) manually resulted      Medical Admin Record      Patient disposition: Home    Electronically signed by PATRICIA Maria  5:37 PM           [1] (Not in a hospital admission)   [2]   Past Medical History:  Diagnosis Date    H/O barium enema 03/2025    Hypoxemia 04/16/2015    Hypoxia   [3] History reviewed. No pertinent surgical history.

## 2025-05-22 NOTE — TELEPHONE ENCOUNTER
Left brain (caregiver) a voicemail notifying I would still have him see her for the bleeding and then we can always possibly to the home test later if she doesn't feel colonoscopy necessary.

## 2025-06-09 ENCOUNTER — ANCILLARY PROCEDURE (OUTPATIENT)
Facility: CLINIC | Age: 60
End: 2025-06-09
Payer: MEDICARE

## 2025-06-09 DIAGNOSIS — Z95.0 CARDIAC PACEMAKER: ICD-10-CM

## 2025-06-09 DIAGNOSIS — I44.2 CHB (COMPLETE HEART BLOCK): ICD-10-CM

## 2025-06-09 DIAGNOSIS — Z95.0 CARDIAC PACEMAKER: Primary | ICD-10-CM

## 2025-06-20 DIAGNOSIS — F72 SEVERE INTELLECTUAL DISABILITY: ICD-10-CM

## 2025-06-22 RX ORDER — ERGOCALCIFEROL 1.25 MG/1
CAPSULE ORAL
Qty: 12 CAPSULE | Refills: 0 | Status: SHIPPED | OUTPATIENT
Start: 2025-06-22

## 2025-06-26 ENCOUNTER — OFFICE VISIT (OUTPATIENT)
Dept: URGENT CARE | Age: 60
End: 2025-06-26
Payer: MEDICARE

## 2025-06-26 ENCOUNTER — ANCILLARY PROCEDURE (OUTPATIENT)
Dept: URGENT CARE | Age: 60
End: 2025-06-26
Payer: MEDICARE

## 2025-06-26 VITALS
BODY MASS INDEX: 27.27 KG/M2 | SYSTOLIC BLOOD PRESSURE: 106 MMHG | TEMPERATURE: 97.3 F | RESPIRATION RATE: 22 BRPM | WEIGHT: 135 LBS | DIASTOLIC BLOOD PRESSURE: 60 MMHG | OXYGEN SATURATION: 97 % | HEART RATE: 56 BPM

## 2025-06-26 DIAGNOSIS — R05.1 ACUTE COUGH: ICD-10-CM

## 2025-06-26 DIAGNOSIS — R06.2 WHEEZING: ICD-10-CM

## 2025-06-26 DIAGNOSIS — R05.1 ACUTE COUGH: Primary | ICD-10-CM

## 2025-06-26 PROCEDURE — 71045 X-RAY EXAM CHEST 1 VIEW: CPT | Performed by: REGISTERED NURSE

## 2025-06-26 ASSESSMENT — ENCOUNTER SYMPTOMS
COUGH: 1
WHEEZING: 1

## 2025-06-26 NOTE — PATIENT INSTRUCTIONS
Call pcp and schedule a follow up appointment  If symptoms persist or worsen, go to the ED for further evaluation

## 2025-06-26 NOTE — PROGRESS NOTES
"Subjective   Patient ID: Cyril Hernandez \"Briana" is a 59 y.o. male. They present today with a chief complaint of Chest Congestion (Has had chest congestion per caretakers, hx of pneumonia, intermittent cough. No other symptoms.).    History of Present Illness  See [facility]      History provided by:  Patient      Past Medical History  Allergies as of 06/26/2025 - Reviewed 06/26/2025   Allergen Reaction Noted    Bumetanide Diarrhea 04/02/2025    Inhaled anesthetics (halogen based) Unknown 09/01/2023    Peas Unknown 07/26/2024    Sulfa (sulfonamide antibiotics) Hives 09/01/2023    Chocolate hazelnut flavor Diarrhea 07/26/2024       Prescriptions Prior to Admission[1]     Medical History[2]    Surgical History[3]     reports that he has never smoked. He has never used smokeless tobacco. He reports that he does not drink alcohol and does not use drugs.    Review of Systems  Review of Systems   Respiratory:  Positive for cough and wheezing.    All other systems reviewed and are negative.                                 Objective    Vitals:    06/26/25 1853   BP: 106/60   BP Location: Left arm   Patient Position: Sitting   BP Cuff Size: Adult long   Pulse: 56   Resp: 22   Temp: 36.3 °C (97.3 °F)   TempSrc: Temporal   SpO2: 97%   Weight: 61.2 kg (135 lb)     No LMP for male patient.    Physical Exam  Vitals and nursing note reviewed.   HENT:      Head: Normocephalic.      Right Ear: Ear canal normal.      Left Ear: Ear canal normal.      Nose: Nose normal.      Mouth/Throat:      Lips: Pink.      Mouth: Mucous membranes are moist.      Pharynx: Oropharynx is clear. Uvula midline.   Cardiovascular:      Rate and Rhythm: Normal rate and regular rhythm.      Heart sounds: Normal heart sounds.   Pulmonary:      Effort: Pulmonary effort is normal.      Breath sounds: Normal breath sounds.   Neurological:      General: No focal deficit present.      Mental Status: He is alert and oriented to person, place, and time. "         Procedures    Point of Care Test & Imaging Results from this visit  No results found for this visit on 06/26/25.   Imaging  No results found.    Cardiology, Vascular, and Other Imaging  No other imaging results found for the past 2 days      Diagnostic study results (if any) were reviewed by Crystal L Severino, APRN-CNP.    Assessment/Plan   Allergies, medications, history, and pertinent labs/EKGs/Imaging reviewed by Crystal L Severino, APRN-CNP.     Medical Decision Making  5-year-old with a history of complete heart block with cardiac pacemaker, hypertension, hyperlipidemia, trisomy 21 stage III chronic kidney disease and multiple episodes of pneumonia presents with his sister who is his caretaker who states she received a phone call from the home where patient resides stating that he was having a cough with some congestion.  Sister states when she was on the phone with them she could audibly hear some wheezing and congestion but now that she has come in from [place] and has been with him he has not shown any signs of coughing or congestion.  Upon assessment as noted patient is very hard to assess lung sounds as he does not take a deep breath when asked.  Lung sounds are shallow but no rhonchi or wheezing is appreciated.  Sister states home denies any fevers or chills, any complaints of pain and no vomiting or diarrhea.  Based on patient's history and the report received from the nursing facility I will obtain a chest x-ray; right basilar atelectasis.  I talk with sister and tell her she needs to schedule a follow up appt with pcp and if symptoms worsen, go to the ED for further eval.  At time of discharge patient was clinically well-appearing and HDS for outpatient management. The patient and/or family was educated regarding diagnosis, supportive care, OTC and Rx medications. The patient and/or family was given the opportunity to ask questions prior to discharge.  They verbalized understanding of my  discussion of the plans for treatment, expected course, indications to return to  or seek further evaluation in ED, and the need for timely follow up as directed.   They were provided with a work/school excuse if requested.      Orders and Diagnoses  Diagnoses and all orders for this visit:  Acute cough  -     XR chest 2 views; Future  Wheezing  -     XR chest 2 views; Future      Medical Admin Record      Patient disposition: Home    Electronically signed by Crystal L Severino, APRN-CNP  7:04 PM           [1] (Not in a hospital admission)  [2]   Past Medical History:  Diagnosis Date    H/O barium enema 03/2025    Hypoxemia 04/16/2015    Hypoxia   [3] No past surgical history on file.

## 2025-06-27 ENCOUNTER — OFFICE VISIT (OUTPATIENT)
Dept: PRIMARY CARE | Facility: CLINIC | Age: 60
End: 2025-06-27
Payer: MEDICARE

## 2025-06-27 VITALS
HEIGHT: 60 IN | TEMPERATURE: 97.3 F | OXYGEN SATURATION: 99 % | BODY MASS INDEX: 24.35 KG/M2 | HEART RATE: 63 BPM | WEIGHT: 124 LBS | SYSTOLIC BLOOD PRESSURE: 106 MMHG | DIASTOLIC BLOOD PRESSURE: 72 MMHG

## 2025-06-27 DIAGNOSIS — L20.9 ATOPIC DERMATITIS OF SCALP: ICD-10-CM

## 2025-06-27 DIAGNOSIS — J98.11 ATELECTASIS OF RIGHT LUNG: Primary | ICD-10-CM

## 2025-06-27 DIAGNOSIS — J98.11 ATELECTASIS OF RIGHT LUNG: ICD-10-CM

## 2025-06-27 DIAGNOSIS — Q90.9 COMPLETE TRISOMY 21 SYNDROME (HHS-HCC): ICD-10-CM

## 2025-06-27 PROCEDURE — 1036F TOBACCO NON-USER: CPT | Performed by: REGISTERED NURSE

## 2025-06-27 PROCEDURE — 3078F DIAST BP <80 MM HG: CPT | Performed by: REGISTERED NURSE

## 2025-06-27 PROCEDURE — 99213 OFFICE O/P EST LOW 20 MIN: CPT | Performed by: REGISTERED NURSE

## 2025-06-27 PROCEDURE — 3008F BODY MASS INDEX DOCD: CPT | Performed by: REGISTERED NURSE

## 2025-06-27 PROCEDURE — 3074F SYST BP LT 130 MM HG: CPT | Performed by: REGISTERED NURSE

## 2025-06-27 PROCEDURE — G2211 COMPLEX E/M VISIT ADD ON: HCPCS | Performed by: REGISTERED NURSE

## 2025-06-27 RX ORDER — PREDNISONE 20 MG/1
20 TABLET ORAL DAILY
Qty: 5 TABLET | Refills: 0 | Status: SHIPPED | OUTPATIENT
Start: 2025-06-27 | End: 2025-07-02

## 2025-06-27 RX ORDER — AZITHROMYCIN 250 MG/1
TABLET, FILM COATED ORAL
Qty: 6 TABLET | Refills: 0 | Status: SHIPPED | OUTPATIENT
Start: 2025-06-27 | End: 2025-07-02

## 2025-06-27 RX ORDER — KETOCONAZOLE 20 MG/ML
SHAMPOO, SUSPENSION TOPICAL 2 TIMES WEEKLY
Qty: 120 ML | Refills: 0 | Status: SHIPPED | OUTPATIENT
Start: 2025-06-30

## 2025-06-27 ASSESSMENT — LIFESTYLE VARIABLES
HOW OFTEN DO YOU HAVE A DRINK CONTAINING ALCOHOL: NEVER
HOW OFTEN DO YOU HAVE SIX OR MORE DRINKS ON ONE OCCASION: NEVER

## 2025-06-27 ASSESSMENT — PATIENT HEALTH QUESTIONNAIRE - PHQ9
2. FEELING DOWN, DEPRESSED OR HOPELESS: NOT AT ALL
SUM OF ALL RESPONSES TO PHQ9 QUESTIONS 1 AND 2: 0
1. LITTLE INTEREST OR PLEASURE IN DOING THINGS: NOT AT ALL

## 2025-06-27 ASSESSMENT — PAIN SCALES - GENERAL: PAINLEVEL_OUTOF10: 0-NO PAIN

## 2025-06-27 ASSESSMENT — ENCOUNTER SYMPTOMS: COUGH: 1

## 2025-06-27 NOTE — PROGRESS NOTES
"Subjective   Patient ID: Elliott Hernandez is a 59 y.o. male who presents for Follow-up (Pt here for follow up from urgent care yesterday for cough/thick dark yellow mucous. Pt was told he had basilar atelectasis.).    HPI   Pt here for UC follow up for atelectasis as prevention from pneumonia  Review of Systems   Respiratory:  Positive for cough.    All other systems reviewed and are negative.      Objective   /72   Pulse 63   Temp 36.3 °C (97.3 °F)   Ht (!) 1.499 m (4' 11\")   Wt 56.2 kg (124 lb)   SpO2 99%   BMI 25.04 kg/m²     Physical Exam  Vitals reviewed.   Constitutional:       Appearance: Normal appearance.   Cardiovascular:      Rate and Rhythm: Normal rate.      Pulses: Normal pulses.   Pulmonary:      Effort: Pulmonary effort is normal.      Breath sounds: Stridor present.   Skin:     Findings: Lesion present.   Neurological:      Mental Status: He is alert.   Psychiatric:         Mood and Affect: Mood normal.         Behavior: Behavior normal.         Assessment/Plan   Problem List Items Addressed This Visit           ICD-10-CM    Complete trisomy 21 syndrome (HHS-HCC) Q90.9     Other Visit Diagnoses         Codes      Atelectasis of right lung    -  Primary J98.11    Relevant Medications    azithromycin (Zithromax) 250 mg tablet    predniSONE (Deltasone) 20 mg tablet      Atopic dermatitis of scalp     L20.9    Relevant Medications    ketoconazole (NIZOral) 2 % shampoo (Start on 6/30/2025)               "

## 2025-06-30 RX ORDER — KETOCONAZOLE 20 MG/ML
SHAMPOO, SUSPENSION TOPICAL 2 TIMES WEEKLY
Qty: 120 ML | Refills: 0 | Status: SHIPPED | OUTPATIENT
Start: 2025-06-30

## 2025-06-30 RX ORDER — AZITHROMYCIN 250 MG/1
TABLET, FILM COATED ORAL
Qty: 6 TABLET | Refills: 0 | Status: SHIPPED | OUTPATIENT
Start: 2025-06-30

## 2025-06-30 RX ORDER — PREDNISONE 20 MG/1
20 TABLET ORAL DAILY
Qty: 5 TABLET | Refills: 0 | Status: SHIPPED | OUTPATIENT
Start: 2025-06-30 | End: 2025-07-05

## 2025-07-08 DIAGNOSIS — J42 CHRONIC BRONCHITIS, UNSPECIFIED CHRONIC BRONCHITIS TYPE (MULTI): ICD-10-CM

## 2025-07-08 RX ORDER — ALBUTEROL SULFATE 0.83 MG/ML
2.5 SOLUTION RESPIRATORY (INHALATION) 3 TIMES DAILY
Qty: 270 ML | Refills: 2 | Status: SHIPPED | OUTPATIENT
Start: 2025-07-08

## 2025-07-15 ENCOUNTER — TELEPHONE (OUTPATIENT)
Dept: PRIMARY CARE | Facility: CLINIC | Age: 60
End: 2025-07-15
Payer: MEDICARE

## 2025-07-16 NOTE — TELEPHONE ENCOUNTER
Spoke to patients guardian and she state that she will check with the staff and see if he still needs an appointment.

## 2025-08-13 ENCOUNTER — OFFICE VISIT (OUTPATIENT)
Dept: URGENT CARE | Age: 60
End: 2025-08-13
Payer: MEDICARE

## 2025-08-13 VITALS
BODY MASS INDEX: 25.04 KG/M2 | SYSTOLIC BLOOD PRESSURE: 95 MMHG | DIASTOLIC BLOOD PRESSURE: 66 MMHG | WEIGHT: 124 LBS | OXYGEN SATURATION: 96 % | TEMPERATURE: 98.2 F | RESPIRATION RATE: 20 BRPM | HEART RATE: 86 BPM

## 2025-08-13 DIAGNOSIS — R09.81 NASAL CONGESTION: Primary | ICD-10-CM

## 2025-08-13 DIAGNOSIS — R05.1 ACUTE COUGH: ICD-10-CM

## 2025-08-13 PROCEDURE — 1036F TOBACCO NON-USER: CPT | Performed by: PHYSICIAN ASSISTANT

## 2025-08-13 PROCEDURE — 3074F SYST BP LT 130 MM HG: CPT | Performed by: PHYSICIAN ASSISTANT

## 2025-08-13 PROCEDURE — 99213 OFFICE O/P EST LOW 20 MIN: CPT | Performed by: PHYSICIAN ASSISTANT

## 2025-08-13 PROCEDURE — 3078F DIAST BP <80 MM HG: CPT | Performed by: PHYSICIAN ASSISTANT

## 2025-08-13 RX ORDER — GUAIFENESIN 100 MG/5ML
200 LIQUID ORAL 3 TIMES DAILY PRN
Qty: 120 ML | Refills: 0 | Status: SHIPPED | OUTPATIENT
Start: 2025-08-13 | End: 2025-08-23

## 2025-09-02 ENCOUNTER — HOSPITAL ENCOUNTER (EMERGENCY)
Facility: HOSPITAL | Age: 60
Discharge: HOME | End: 2025-09-02
Attending: STUDENT IN AN ORGANIZED HEALTH CARE EDUCATION/TRAINING PROGRAM
Payer: MEDICARE

## 2025-09-02 ENCOUNTER — APPOINTMENT (OUTPATIENT)
Dept: CARDIOLOGY | Facility: HOSPITAL | Age: 60
End: 2025-09-02
Payer: MEDICARE

## 2025-09-02 ENCOUNTER — APPOINTMENT (OUTPATIENT)
Dept: RADIOLOGY | Facility: HOSPITAL | Age: 60
End: 2025-09-02
Payer: MEDICARE

## 2025-09-02 VITALS
HEIGHT: 60 IN | OXYGEN SATURATION: 100 % | HEART RATE: 60 BPM | RESPIRATION RATE: 16 BRPM | BODY MASS INDEX: 25.97 KG/M2 | SYSTOLIC BLOOD PRESSURE: 148 MMHG | TEMPERATURE: 98.6 F | DIASTOLIC BLOOD PRESSURE: 77 MMHG | WEIGHT: 132.28 LBS

## 2025-09-02 DIAGNOSIS — U07.1 COVID: Primary | ICD-10-CM

## 2025-09-02 DIAGNOSIS — R56.9 SEIZURE-LIKE ACTIVITY (MULTI): ICD-10-CM

## 2025-09-02 LAB
ALBUMIN SERPL BCP-MCNC: 3.4 G/DL (ref 3.4–5)
ALP SERPL-CCNC: 72 U/L (ref 33–136)
ALT SERPL W P-5'-P-CCNC: 17 U/L (ref 10–52)
ANION GAP SERPL CALCULATED.3IONS-SCNC: 16 MMOL/L (ref 10–20)
APPEARANCE UR: CLEAR
AST SERPL W P-5'-P-CCNC: 22 U/L (ref 9–39)
BASOPHILS # BLD AUTO: 0.06 X10*3/UL (ref 0–0.1)
BASOPHILS NFR BLD AUTO: 1.5 %
BILIRUB SERPL-MCNC: 0.3 MG/DL (ref 0–1.2)
BILIRUB UR STRIP.AUTO-MCNC: NEGATIVE MG/DL
BUN SERPL-MCNC: 15 MG/DL (ref 6–23)
CALCIUM SERPL-MCNC: 8.8 MG/DL (ref 8.6–10.3)
CHLORIDE SERPL-SCNC: 107 MMOL/L (ref 98–107)
CO2 SERPL-SCNC: 20 MMOL/L (ref 21–32)
COLOR UR: COLORLESS
CREAT SERPL-MCNC: 1.07 MG/DL (ref 0.5–1.3)
EGFRCR SERPLBLD CKD-EPI 2021: 79 ML/MIN/1.73M*2
EOSINOPHIL # BLD AUTO: 0.05 X10*3/UL (ref 0–0.7)
EOSINOPHIL NFR BLD AUTO: 1.3 %
ERYTHROCYTE [DISTWIDTH] IN BLOOD BY AUTOMATED COUNT: 14.8 % (ref 11.5–14.5)
FLUAV RNA RESP QL NAA+PROBE: NOT DETECTED
FLUBV RNA RESP QL NAA+PROBE: NOT DETECTED
GLUCOSE SERPL-MCNC: 82 MG/DL (ref 74–99)
GLUCOSE UR STRIP.AUTO-MCNC: NORMAL MG/DL
HCT VFR BLD AUTO: 45.8 % (ref 41–52)
HGB BLD-MCNC: 15 G/DL (ref 13.5–17.5)
IMM GRANULOCYTES # BLD AUTO: 0.02 X10*3/UL (ref 0–0.7)
IMM GRANULOCYTES NFR BLD AUTO: 0.5 % (ref 0–0.9)
KETONES UR STRIP.AUTO-MCNC: NEGATIVE MG/DL
LACTATE SERPL-SCNC: 1.4 MMOL/L (ref 0.4–2)
LACTATE SERPL-SCNC: 4.5 MMOL/L (ref 0.4–2)
LEUKOCYTE ESTERASE UR QL STRIP.AUTO: NEGATIVE
LYMPHOCYTES # BLD AUTO: 0.91 X10*3/UL (ref 1.2–4.8)
LYMPHOCYTES NFR BLD AUTO: 22.9 %
MAGNESIUM SERPL-MCNC: 2.36 MG/DL (ref 1.6–2.4)
MCH RBC QN AUTO: 30.2 PG (ref 26–34)
MCHC RBC AUTO-ENTMCNC: 32.8 G/DL (ref 32–36)
MCV RBC AUTO: 92 FL (ref 80–100)
MONOCYTES # BLD AUTO: 0.66 X10*3/UL (ref 0.1–1)
MONOCYTES NFR BLD AUTO: 16.6 %
NEUTROPHILS # BLD AUTO: 2.27 X10*3/UL (ref 1.2–7.7)
NEUTROPHILS NFR BLD AUTO: 57.2 %
NITRITE UR QL STRIP.AUTO: NEGATIVE
NRBC BLD-RTO: 0 /100 WBCS (ref 0–0)
PH UR STRIP.AUTO: 6.5 [PH]
PLATELET # BLD AUTO: 196 X10*3/UL (ref 150–450)
POTASSIUM SERPL-SCNC: 3.8 MMOL/L (ref 3.5–5.3)
PROT SERPL-MCNC: 6.9 G/DL (ref 6.4–8.2)
PROT UR STRIP.AUTO-MCNC: NEGATIVE MG/DL
RBC # BLD AUTO: 4.96 X10*6/UL (ref 4.5–5.9)
RBC # UR STRIP.AUTO: NEGATIVE MG/DL
RSV RNA RESP QL NAA+PROBE: NOT DETECTED
SARS-COV-2 RNA RESP QL NAA+PROBE: DETECTED
SODIUM SERPL-SCNC: 139 MMOL/L (ref 136–145)
SP GR UR STRIP.AUTO: 1.01
TSH SERPL-ACNC: 0.84 MIU/L (ref 0.44–3.98)
UROBILINOGEN UR STRIP.AUTO-MCNC: NORMAL MG/DL
WBC # BLD AUTO: 4 X10*3/UL (ref 4.4–11.3)

## 2025-09-02 PROCEDURE — 80053 COMPREHEN METABOLIC PANEL: CPT | Performed by: STUDENT IN AN ORGANIZED HEALTH CARE EDUCATION/TRAINING PROGRAM

## 2025-09-02 PROCEDURE — 83605 ASSAY OF LACTIC ACID: CPT | Performed by: STUDENT IN AN ORGANIZED HEALTH CARE EDUCATION/TRAINING PROGRAM

## 2025-09-02 PROCEDURE — 84443 ASSAY THYROID STIM HORMONE: CPT | Performed by: STUDENT IN AN ORGANIZED HEALTH CARE EDUCATION/TRAINING PROGRAM

## 2025-09-02 PROCEDURE — 93005 ELECTROCARDIOGRAM TRACING: CPT

## 2025-09-02 PROCEDURE — 36415 COLL VENOUS BLD VENIPUNCTURE: CPT | Performed by: STUDENT IN AN ORGANIZED HEALTH CARE EDUCATION/TRAINING PROGRAM

## 2025-09-02 PROCEDURE — 70450 CT HEAD/BRAIN W/O DYE: CPT

## 2025-09-02 PROCEDURE — 87637 SARSCOV2&INF A&B&RSV AMP PRB: CPT | Performed by: STUDENT IN AN ORGANIZED HEALTH CARE EDUCATION/TRAINING PROGRAM

## 2025-09-02 PROCEDURE — 83735 ASSAY OF MAGNESIUM: CPT | Performed by: STUDENT IN AN ORGANIZED HEALTH CARE EDUCATION/TRAINING PROGRAM

## 2025-09-02 PROCEDURE — 81003 URINALYSIS AUTO W/O SCOPE: CPT | Performed by: STUDENT IN AN ORGANIZED HEALTH CARE EDUCATION/TRAINING PROGRAM

## 2025-09-02 PROCEDURE — 85025 COMPLETE CBC W/AUTO DIFF WBC: CPT | Performed by: STUDENT IN AN ORGANIZED HEALTH CARE EDUCATION/TRAINING PROGRAM

## 2025-09-02 PROCEDURE — 99285 EMERGENCY DEPT VISIT HI MDM: CPT | Mod: 25 | Performed by: STUDENT IN AN ORGANIZED HEALTH CARE EDUCATION/TRAINING PROGRAM

## 2025-09-02 PROCEDURE — 70450 CT HEAD/BRAIN W/O DYE: CPT | Performed by: RADIOLOGY

## 2025-09-02 PROCEDURE — 84146 ASSAY OF PROLACTIN: CPT | Mod: TRILAB | Performed by: STUDENT IN AN ORGANIZED HEALTH CARE EDUCATION/TRAINING PROGRAM

## 2025-09-02 PROCEDURE — 2500000004 HC RX 250 GENERAL PHARMACY W/ HCPCS (ALT 636 FOR OP/ED): Performed by: STUDENT IN AN ORGANIZED HEALTH CARE EDUCATION/TRAINING PROGRAM

## 2025-09-02 RX ORDER — MIDAZOLAM HYDROCHLORIDE 5 MG/ML
2 INJECTION, SOLUTION INTRAMUSCULAR; INTRAVENOUS ONCE
Status: COMPLETED | OUTPATIENT
Start: 2025-09-02 | End: 2025-09-02

## 2025-09-02 RX ADMIN — SODIUM CHLORIDE 1000 ML: 900 INJECTION, SOLUTION INTRAVENOUS at 12:53

## 2025-09-02 RX ADMIN — MIDAZOLAM HYDROCHLORIDE 2 MG: 5 INJECTION, SOLUTION INTRAMUSCULAR; INTRAVENOUS at 11:27

## 2025-09-02 ASSESSMENT — PAIN - FUNCTIONAL ASSESSMENT: PAIN_FUNCTIONAL_ASSESSMENT: UNABLE TO SELF-REPORT

## 2025-09-03 LAB — PROLACTIN SERPL-MCNC: 13.1 UG/L (ref 2–18)

## 2025-10-06 ENCOUNTER — APPOINTMENT (OUTPATIENT)
Dept: UROLOGY | Facility: CLINIC | Age: 60
End: 2025-10-06
Payer: MEDICARE